# Patient Record
Sex: FEMALE | Race: WHITE | NOT HISPANIC OR LATINO | Employment: OTHER | URBAN - NONMETROPOLITAN AREA
[De-identification: names, ages, dates, MRNs, and addresses within clinical notes are randomized per-mention and may not be internally consistent; named-entity substitution may affect disease eponyms.]

---

## 2017-01-31 ENCOUNTER — TRANSCRIBE ORDERS (OUTPATIENT)
Dept: ADMINISTRATIVE | Facility: HOSPITAL | Age: 82
End: 2017-01-31

## 2017-01-31 DIAGNOSIS — F17.200 CURRENT SMOKER: Primary | ICD-10-CM

## 2017-02-13 ENCOUNTER — APPOINTMENT (OUTPATIENT)
Dept: CT IMAGING | Facility: HOSPITAL | Age: 82
End: 2017-02-13
Attending: INTERNAL MEDICINE

## 2017-07-25 LAB
ALBUMIN SERPL-MCNC: 4.5 G/DL (ref 3.5–5.2)
ALP BLD-CCNC: 108 U/L (ref 35–104)
ALT SERPL-CCNC: 11 U/L (ref 5–33)
ANION GAP SERPL CALCULATED.3IONS-SCNC: 24 MMOL/L (ref 7–19)
AST SERPL-CCNC: 17 U/L (ref 5–32)
BASOPHILS ABSOLUTE: 0.1 K/UL (ref 0–0.2)
BASOPHILS RELATIVE PERCENT: 0.6 % (ref 0–1)
BILIRUB SERPL-MCNC: 0.6 MG/DL (ref 0.2–1.2)
BUN BLDV-MCNC: 21 MG/DL (ref 8–23)
CALCIUM SERPL-MCNC: 10.2 MG/DL (ref 8.8–10.2)
CHLORIDE BLD-SCNC: 99 MMOL/L (ref 98–111)
CO2: 21 MMOL/L (ref 22–29)
CREAT SERPL-MCNC: 0.9 MG/DL (ref 0.5–0.9)
EOSINOPHILS ABSOLUTE: 0.5 K/UL (ref 0–0.6)
EOSINOPHILS RELATIVE PERCENT: 5.9 % (ref 0–5)
GFR NON-AFRICAN AMERICAN: 60
GLUCOSE BLD-MCNC: 79 MG/DL (ref 74–109)
HCT VFR BLD CALC: 42.8 % (ref 37–47)
HEMOGLOBIN: 14.7 G/DL (ref 12–16)
LDL CHOLESTEROL DIRECT: 81 MG/DL
LYMPHOCYTES ABSOLUTE: 2.3 K/UL (ref 1.1–4.5)
LYMPHOCYTES RELATIVE PERCENT: 26.3 % (ref 20–40)
MCH RBC QN AUTO: 31.1 PG (ref 27–31)
MCHC RBC AUTO-ENTMCNC: 34.3 G/DL (ref 33–37)
MCV RBC AUTO: 90.5 FL (ref 81–99)
MONOCYTES ABSOLUTE: 0.6 K/UL (ref 0–0.9)
MONOCYTES RELATIVE PERCENT: 7 % (ref 0–10)
NEUTROPHILS ABSOLUTE: 5.2 K/UL (ref 1.5–7.5)
NEUTROPHILS RELATIVE PERCENT: 60 % (ref 50–65)
PDW BLD-RTO: 12.3 % (ref 11.5–14.5)
PLATELET # BLD: 194 K/UL (ref 130–400)
PMV BLD AUTO: 10.5 FL (ref 9.4–12.3)
POTASSIUM SERPL-SCNC: 3.6 MMOL/L (ref 3.5–5)
RBC # BLD: 4.73 M/UL (ref 4.2–5.4)
SODIUM BLD-SCNC: 144 MMOL/L (ref 136–145)
TOTAL PROTEIN: 7.4 G/DL (ref 6.6–8.7)
TSH SERPL DL<=0.05 MIU/L-ACNC: 2.89 UIU/ML (ref 0.27–4.2)
WBC # BLD: 8.6 K/UL (ref 4.8–10.8)

## 2017-08-01 ENCOUNTER — OFFICE VISIT (OUTPATIENT)
Dept: INTERNAL MEDICINE | Age: 82
End: 2017-08-01
Payer: MEDICARE

## 2017-08-01 VITALS
HEART RATE: 52 BPM | TEMPERATURE: 97.3 F | SYSTOLIC BLOOD PRESSURE: 122 MMHG | HEIGHT: 60 IN | OXYGEN SATURATION: 96 % | RESPIRATION RATE: 16 BRPM | DIASTOLIC BLOOD PRESSURE: 64 MMHG | BODY MASS INDEX: 28.07 KG/M2 | WEIGHT: 143 LBS

## 2017-08-01 DIAGNOSIS — E03.9 HYPOTHYROIDISM, UNSPECIFIED TYPE: ICD-10-CM

## 2017-08-01 DIAGNOSIS — Z13.820 OSTEOPOROSIS SCREENING: Primary | ICD-10-CM

## 2017-08-01 DIAGNOSIS — I10 ESSENTIAL HYPERTENSION: ICD-10-CM

## 2017-08-01 DIAGNOSIS — E55.9 VITAMIN D DEFICIENCY: ICD-10-CM

## 2017-08-01 DIAGNOSIS — I25.10 CORONARY ARTERY DISEASE INVOLVING NATIVE HEART WITHOUT ANGINA PECTORIS, UNSPECIFIED VESSEL OR LESION TYPE: ICD-10-CM

## 2017-08-01 DIAGNOSIS — E78.2 MIXED HYPERLIPIDEMIA: ICD-10-CM

## 2017-08-01 DIAGNOSIS — E53.8 VITAMIN B12 DEFICIENCY: ICD-10-CM

## 2017-08-01 PROCEDURE — G8427 DOCREV CUR MEDS BY ELIG CLIN: HCPCS | Performed by: NURSE PRACTITIONER

## 2017-08-01 PROCEDURE — G8598 ASA/ANTIPLAT THER USED: HCPCS | Performed by: NURSE PRACTITIONER

## 2017-08-01 PROCEDURE — 1123F ACP DISCUSS/DSCN MKR DOCD: CPT | Performed by: NURSE PRACTITIONER

## 2017-08-01 PROCEDURE — G8419 CALC BMI OUT NRM PARAM NOF/U: HCPCS | Performed by: NURSE PRACTITIONER

## 2017-08-01 PROCEDURE — 99214 OFFICE O/P EST MOD 30 MIN: CPT | Performed by: NURSE PRACTITIONER

## 2017-08-01 PROCEDURE — 4040F PNEUMOC VAC/ADMIN/RCVD: CPT | Performed by: NURSE PRACTITIONER

## 2017-08-01 PROCEDURE — 4004F PT TOBACCO SCREEN RCVD TLK: CPT | Performed by: NURSE PRACTITIONER

## 2017-08-01 PROCEDURE — 1090F PRES/ABSN URINE INCON ASSESS: CPT | Performed by: NURSE PRACTITIONER

## 2017-08-01 PROCEDURE — G8400 PT W/DXA NO RESULTS DOC: HCPCS | Performed by: NURSE PRACTITIONER

## 2017-08-01 RX ORDER — SIMVASTATIN 40 MG
TABLET ORAL
Refills: 3 | COMMUNITY
Start: 2017-07-14

## 2017-08-01 RX ORDER — CLOPIDOGREL BISULFATE 75 MG/1
TABLET ORAL
Refills: 3 | COMMUNITY
Start: 2017-05-03

## 2017-08-01 RX ORDER — HYDROCHLOROTHIAZIDE 12.5 MG/1
CAPSULE, GELATIN COATED ORAL
Refills: 5 | COMMUNITY
Start: 2017-07-14

## 2017-08-01 RX ORDER — SYRINGE WITH NEEDLE, 1 ML 25GX5/8"
SYRINGE, EMPTY DISPOSABLE MISCELLANEOUS
Refills: 5 | COMMUNITY
Start: 2017-06-26

## 2017-08-01 RX ORDER — LEVOTHYROXINE SODIUM 88 UG/1
TABLET ORAL
Refills: 3 | COMMUNITY
Start: 2017-06-06

## 2017-08-01 RX ORDER — CYANOCOBALAMIN 1000 UG/ML
1000 INJECTION, SOLUTION INTRAMUSCULAR; SUBCUTANEOUS ONCE
COMMUNITY

## 2017-08-01 RX ORDER — AMLODIPINE BESYLATE 10 MG/1
TABLET ORAL
Refills: 3 | COMMUNITY
Start: 2017-06-06

## 2017-08-01 ASSESSMENT — ENCOUNTER SYMPTOMS
VOMITING: 0
STRIDOR: 0
COUGH: 0
BLOOD IN STOOL: 0
SHORTNESS OF BREATH: 0
ABDOMINAL PAIN: 0
EYE ITCHING: 0
TROUBLE SWALLOWING: 0
DIARRHEA: 0
COLOR CHANGE: 0
ABDOMINAL DISTENTION: 0
CONSTIPATION: 0
SORE THROAT: 0
CHOKING: 0
WHEEZING: 0
NAUSEA: 0
EYE DISCHARGE: 0

## 2017-08-01 ASSESSMENT — PATIENT HEALTH QUESTIONNAIRE - PHQ9
SUM OF ALL RESPONSES TO PHQ9 QUESTIONS 1 & 2: 0
2. FEELING DOWN, DEPRESSED OR HOPELESS: 0
1. LITTLE INTEREST OR PLEASURE IN DOING THINGS: 0
SUM OF ALL RESPONSES TO PHQ QUESTIONS 1-9: 0

## 2017-10-11 ENCOUNTER — OFFICE VISIT (OUTPATIENT)
Dept: CARDIOLOGY | Facility: CLINIC | Age: 82
End: 2017-10-11

## 2017-10-11 VITALS
SYSTOLIC BLOOD PRESSURE: 170 MMHG | BODY MASS INDEX: 27.48 KG/M2 | WEIGHT: 140 LBS | DIASTOLIC BLOOD PRESSURE: 72 MMHG | HEART RATE: 67 BPM | HEIGHT: 60 IN

## 2017-10-11 DIAGNOSIS — I10 ESSENTIAL HYPERTENSION: ICD-10-CM

## 2017-10-11 DIAGNOSIS — E78.2 MIXED HYPERLIPIDEMIA: ICD-10-CM

## 2017-10-11 DIAGNOSIS — I71.40 ABDOMINAL AORTIC ANEURYSM (AAA) WITHOUT RUPTURE (HCC): ICD-10-CM

## 2017-10-11 DIAGNOSIS — I25.10 CORONARY ARTERY DISEASE INVOLVING NATIVE CORONARY ARTERY OF NATIVE HEART WITHOUT ANGINA PECTORIS: Primary | ICD-10-CM

## 2017-10-11 PROCEDURE — 93000 ELECTROCARDIOGRAM COMPLETE: CPT | Performed by: INTERNAL MEDICINE

## 2017-10-11 PROCEDURE — 99213 OFFICE O/P EST LOW 20 MIN: CPT | Performed by: INTERNAL MEDICINE

## 2017-10-11 RX ORDER — METOPROLOL SUCCINATE 25 MG/1
25 TABLET, EXTENDED RELEASE ORAL DAILY
Status: ON HOLD | COMMUNITY
End: 2020-10-21 | Stop reason: SDUPTHER

## 2017-10-11 RX ORDER — LANOLIN ALCOHOL/MO/W.PET/CERES
1000 CREAM (GRAM) TOPICAL DAILY
Status: ON HOLD | COMMUNITY
End: 2020-10-21

## 2017-10-11 NOTE — PROGRESS NOTES
"Subjective    Alka Nicolas is a 82 y.o. female. Fu of ihd, risks and aaa    History of Present Illness     IHD:  Is active with yard work and housework but not much exercise. No cp or unusual sob or decline in stamina. Is compliant with meds that are well tolerated. EKG is NSC x a PAC.    HLD:  Tolerates a potent statin well and recent check with pcp was \"good\"    HTN:  Monitors at home \"135-140/60-70\", but, \"I've been rushing around this morning\"    AAA:  Can't find the measurements on this but is not being surveilled now. No abd pain and no foot pain or discoloration      The following portions of the patient's history were reviewed and updated as appropriate: allergies, current medications, past family history, past medical history, past social history, past surgical history and problem list.    Patient Active Problem List   Diagnosis   • CAD (coronary artery disease)   • Hypertension   • Hyperlipidemia   • Abdominal aortic aneurysm       No Known Allergies    Family History   Problem Relation Age of Onset   • No Known Problems Mother    • Heart disease Father    • Hyperlipidemia Father    • Hypertension Father    • Stroke Father        Social History     Social History   • Marital status: Unknown     Spouse name: N/A   • Number of children: N/A   • Years of education: N/A     Occupational History   • Not on file.     Social History Main Topics   • Smoking status: Current Every Day Smoker   • Smokeless tobacco: Never Used   • Alcohol use No   • Drug use: No   • Sexual activity: Not on file     Other Topics Concern   • Not on file     Social History Narrative         Current Outpatient Prescriptions:   •  amLODIPine (NORVASC) 5 MG tablet, Take 10 mg by mouth Daily., Disp: , Rfl:   •  aspirin 81 MG EC tablet, Take 81 mg by mouth Daily., Disp: , Rfl:   •  B Complex-C (B COMPLEX-B12-C IJ), Inject  as directed., Disp: , Rfl:   •  clopidogrel (PLAVIX) 75 MG tablet, Take 75 mg by mouth Daily., Disp: , Rfl:   •  " "hydrochlorothiazide (MICROZIDE) 12.5 MG capsule, Take 12.5 mg by mouth Daily., Disp: , Rfl:   •  levothyroxine (SYNTHROID, LEVOTHROID) 75 MCG tablet, Take 75 mcg by mouth Daily., Disp: , Rfl:   •  metoprolol succinate XL (TOPROL-XL) 25 MG 24 hr tablet, Take 25 mg by mouth Daily., Disp: , Rfl:   •  nitroglycerin (NITROSTAT) 0.4 MG SL tablet, Place 0.4 mg under the tongue Every 5 (Five) Minutes As Needed for chest pain. Take no more than 3 doses in 15 minutes., Disp: , Rfl:   •  simvastatin (ZOCOR) 20 MG tablet, Take 40 mg by mouth Every Night., Disp: , Rfl:   •  vitamin B-12 (CYANOCOBALAMIN) 1000 MCG tablet, Take 1,000 mcg by mouth Daily., Disp: , Rfl:     Past Surgical History:   Procedure Laterality Date   • APPENDECTOMY     • CARDIAC CATHETERIZATION     • CHOLECYSTECTOMY     • HYSTERECTOMY      total       Review of Systems   Respiratory: Negative for apnea, chest tightness and shortness of breath.    Cardiovascular: Negative for chest pain, palpitations and leg swelling.   Gastrointestinal: Negative for abdominal pain.   Genitourinary: Negative for dysuria.   Musculoskeletal: Negative for myalgias.   Neurological: Negative for weakness and light-headedness.   Psychiatric/Behavioral: Negative for sleep disturbance.       /72 (BP Location: Left arm, Patient Position: Sitting)  Pulse 67  Ht 60\" (152.4 cm)  Wt 140 lb (63.5 kg)  BMI 27.34 kg/m2  Procedures    Objective   Physical Exam   Constitutional: She is oriented to person, place, and time. She appears well-developed and well-nourished.   HENT:   Head: Normocephalic.   Eyes: Pupils are equal, round, and reactive to light.   Neck: No thyromegaly present.   Cardiovascular: Normal rate, regular rhythm and intact distal pulses.  Exam reveals no gallop and no friction rub.    Murmur heard.   Systolic murmur is present with a grade of 1/6   Sherrie at base   Pulmonary/Chest: Effort normal and breath sounds normal. No respiratory distress. She has no wheezes. She " has no rales.   Abdominal: Soft. Bowel sounds are normal. She exhibits no distension. There is no tenderness.   Musculoskeletal: She exhibits no edema or tenderness.   Neurological: She is alert and oriented to person, place, and time.   Skin: Skin is warm and dry.   Psychiatric: She has a normal mood and affect.       Assessment/Plan   Alka was seen today for coronary artery disease, hypertension and hyperlipidemia.    Diagnoses and all orders for this visit:    Coronary artery disease involving native coronary artery of native heart without angina pectoris  Comments:  no angina  Orders:  -     ECG 12 Lead    Essential hypertension  Comments:  controlled by home readings    Mixed hyperlipidemia  Comments:  controlled by report    Abdominal aortic aneurysm (AAA) without rupture  Comments:  asymptomatic. not checked recently - will get US of aorta  Orders:  -     Cancel: US Retroperitnl Abd, Leftd  -     US aorta limited; Future                 Return in about 18 months (around 4/11/2019).  Orders Placed This Encounter   Procedures   • US aorta limited     Standing Status:   Future     Standing Expiration Date:   10/11/2018     Order Specific Question:   Reason for Exam:     Answer:   known aaa   • ECG 12 Lead     Order Specific Question:   Reason for Exam:     Answer:   Cad

## 2017-10-20 ENCOUNTER — HOSPITAL ENCOUNTER (OUTPATIENT)
Dept: ULTRASOUND IMAGING | Facility: HOSPITAL | Age: 82
Discharge: HOME | End: 2017-10-20
Attending: INTERNAL MEDICINE | Admitting: INTERNAL MEDICINE

## 2017-10-20 DIAGNOSIS — I71.40 ABDOMINAL AORTIC ANEURYSM (AAA) WITHOUT RUPTURE (HCC): ICD-10-CM

## 2017-10-20 PROCEDURE — 76775 US EXAM ABDO BACK WALL LIM: CPT

## 2017-10-24 ENCOUNTER — OFFICE VISIT (OUTPATIENT)
Dept: RETAIL CLINIC | Facility: CLINIC | Age: 82
End: 2017-10-24

## 2017-10-24 DIAGNOSIS — Z23 NEED FOR IMMUNIZATION AGAINST INFLUENZA: Primary | ICD-10-CM

## 2017-10-24 NOTE — PROGRESS NOTES
Patient presents for flu shot. She denies having problems with flu shots in the past. She denies allergies. See administration note and scanned [employer] forms.

## 2018-02-01 DIAGNOSIS — E78.2 MIXED HYPERLIPIDEMIA: ICD-10-CM

## 2018-02-01 DIAGNOSIS — E03.9 HYPOTHYROIDISM, UNSPECIFIED TYPE: ICD-10-CM

## 2018-02-01 DIAGNOSIS — I10 ESSENTIAL HYPERTENSION: ICD-10-CM

## 2018-02-01 DIAGNOSIS — E55.9 VITAMIN D DEFICIENCY: ICD-10-CM

## 2018-02-01 LAB
ALBUMIN SERPL-MCNC: 4.2 G/DL (ref 3.5–5.2)
ALP BLD-CCNC: 113 U/L (ref 35–104)
ALT SERPL-CCNC: 13 U/L (ref 5–33)
ANION GAP SERPL CALCULATED.3IONS-SCNC: 18 MMOL/L (ref 7–19)
AST SERPL-CCNC: 17 U/L (ref 5–32)
BASOPHILS ABSOLUTE: 0 K/UL (ref 0–0.2)
BASOPHILS RELATIVE PERCENT: 0.5 % (ref 0–1)
BILIRUB SERPL-MCNC: 0.5 MG/DL (ref 0.2–1.2)
BUN BLDV-MCNC: 24 MG/DL (ref 8–23)
CALCIUM SERPL-MCNC: 9.3 MG/DL (ref 8.8–10.2)
CHLORIDE BLD-SCNC: 97 MMOL/L (ref 98–111)
CHOLESTEROL, TOTAL: 143 MG/DL (ref 160–199)
CO2: 25 MMOL/L (ref 22–29)
CREAT SERPL-MCNC: 0.9 MG/DL (ref 0.5–0.9)
EOSINOPHILS ABSOLUTE: 0.4 K/UL (ref 0–0.6)
EOSINOPHILS RELATIVE PERCENT: 4.8 % (ref 0–5)
GFR NON-AFRICAN AMERICAN: 60
GLUCOSE BLD-MCNC: 82 MG/DL (ref 74–109)
HCT VFR BLD CALC: 43.3 % (ref 37–47)
HDLC SERPL-MCNC: 54 MG/DL (ref 65–121)
HEMOGLOBIN: 14.4 G/DL (ref 12–16)
LDL CHOLESTEROL CALCULATED: 59 MG/DL
LYMPHOCYTES ABSOLUTE: 2.1 K/UL (ref 1.1–4.5)
LYMPHOCYTES RELATIVE PERCENT: 24.4 % (ref 20–40)
MCH RBC QN AUTO: 29.9 PG (ref 27–31)
MCHC RBC AUTO-ENTMCNC: 33.3 G/DL (ref 33–37)
MCV RBC AUTO: 89.8 FL (ref 81–99)
MONOCYTES ABSOLUTE: 0.7 K/UL (ref 0–0.9)
MONOCYTES RELATIVE PERCENT: 7.6 % (ref 0–10)
NEUTROPHILS ABSOLUTE: 5.3 K/UL (ref 1.5–7.5)
NEUTROPHILS RELATIVE PERCENT: 62.5 % (ref 50–65)
PDW BLD-RTO: 12.2 % (ref 11.5–14.5)
PLATELET # BLD: 210 K/UL (ref 130–400)
PMV BLD AUTO: 10.2 FL (ref 9.4–12.3)
POTASSIUM SERPL-SCNC: 4 MMOL/L (ref 3.5–5)
RBC # BLD: 4.82 M/UL (ref 4.2–5.4)
SODIUM BLD-SCNC: 140 MMOL/L (ref 136–145)
TOTAL PROTEIN: 7 G/DL (ref 6.6–8.7)
TRIGL SERPL-MCNC: 152 MG/DL (ref 0–149)
TSH SERPL DL<=0.05 MIU/L-ACNC: 2.78 UIU/ML (ref 0.27–4.2)
VITAMIN D 25-HYDROXY: 32.2 NG/ML
WBC # BLD: 8.5 K/UL (ref 4.8–10.8)

## 2018-02-05 ENCOUNTER — OFFICE VISIT (OUTPATIENT)
Dept: INTERNAL MEDICINE | Age: 83
End: 2018-02-05
Payer: MEDICARE

## 2018-02-05 VITALS
SYSTOLIC BLOOD PRESSURE: 138 MMHG | HEART RATE: 56 BPM | WEIGHT: 146 LBS | HEIGHT: 60 IN | DIASTOLIC BLOOD PRESSURE: 72 MMHG | BODY MASS INDEX: 28.66 KG/M2 | OXYGEN SATURATION: 98 %

## 2018-02-05 DIAGNOSIS — I10 ESSENTIAL HYPERTENSION: Primary | ICD-10-CM

## 2018-02-05 DIAGNOSIS — E53.8 VITAMIN B12 DEFICIENCY: ICD-10-CM

## 2018-02-05 DIAGNOSIS — L40.9 PSORIASIS: ICD-10-CM

## 2018-02-05 DIAGNOSIS — E78.2 MIXED HYPERLIPIDEMIA: ICD-10-CM

## 2018-02-05 DIAGNOSIS — E03.9 ACQUIRED HYPOTHYROIDISM: ICD-10-CM

## 2018-02-05 DIAGNOSIS — I25.10 CORONARY ARTERY DISEASE INVOLVING NATIVE CORONARY ARTERY OF NATIVE HEART WITHOUT ANGINA PECTORIS: ICD-10-CM

## 2018-02-05 DIAGNOSIS — Z72.0 TOBACCO USE: ICD-10-CM

## 2018-02-05 PROCEDURE — G8427 DOCREV CUR MEDS BY ELIG CLIN: HCPCS | Performed by: INTERNAL MEDICINE

## 2018-02-05 PROCEDURE — 99214 OFFICE O/P EST MOD 30 MIN: CPT | Performed by: INTERNAL MEDICINE

## 2018-02-05 PROCEDURE — G8484 FLU IMMUNIZE NO ADMIN: HCPCS | Performed by: INTERNAL MEDICINE

## 2018-02-05 PROCEDURE — 1123F ACP DISCUSS/DSCN MKR DOCD: CPT | Performed by: INTERNAL MEDICINE

## 2018-02-05 PROCEDURE — G8400 PT W/DXA NO RESULTS DOC: HCPCS | Performed by: INTERNAL MEDICINE

## 2018-02-05 PROCEDURE — 1090F PRES/ABSN URINE INCON ASSESS: CPT | Performed by: INTERNAL MEDICINE

## 2018-02-05 PROCEDURE — 4040F PNEUMOC VAC/ADMIN/RCVD: CPT | Performed by: INTERNAL MEDICINE

## 2018-02-05 PROCEDURE — G8598 ASA/ANTIPLAT THER USED: HCPCS | Performed by: INTERNAL MEDICINE

## 2018-02-05 PROCEDURE — G8419 CALC BMI OUT NRM PARAM NOF/U: HCPCS | Performed by: INTERNAL MEDICINE

## 2018-02-05 PROCEDURE — 4004F PT TOBACCO SCREEN RCVD TLK: CPT | Performed by: INTERNAL MEDICINE

## 2018-02-05 ASSESSMENT — ENCOUNTER SYMPTOMS
COUGH: 0
SORE THROAT: 0
TROUBLE SWALLOWING: 0
ABDOMINAL PAIN: 0
NAUSEA: 0
RHINORRHEA: 0
SHORTNESS OF BREATH: 0

## 2018-02-05 NOTE — PROGRESS NOTES
 No narrative on file      No Known Allergies   Current Outpatient Prescriptions   Medication Sig Dispense Refill    amLODIPine (NORVASC) 10 MG tablet TAKE 1 TABLET DAILY. 3    hydrochlorothiazide (MICROZIDE) 12.5 MG capsule 1 CAPSULE(S) EVERY OTHER DAY  5    metoprolol tartrate (LOPRESSOR) 25 MG tablet       simvastatin (ZOCOR) 40 MG tablet TAKE 1/2 TABLET DAILY. 3    B-D 3CC LUER-MCKENZIE SYR 23GX1\" 23G X 1\" 3 ML MISC AS DIRECTED  5    clopidogrel (PLAVIX) 75 MG tablet   3    LEVOXYL 88 MCG tablet TAKE 1 TABLET DAILY. 3    aspirin 81 MG tablet Take 81 mg by mouth daily      cyanocobalamin 1000 MCG/ML injection Inject 1,000 mcg into the muscle once      Calcium-Vitamin D-Vitamin K 500-100-40 MG-UNT-MCG CHEW Take by mouth       No current facility-administered medications for this visit. Review of Systems   Constitutional: Negative for fatigue and fever. HENT: Negative for rhinorrhea, sore throat and trouble swallowing. Respiratory: Negative for cough and shortness of breath. Cardiovascular: Negative for chest pain and palpitations. Gastrointestinal: Negative for abdominal pain and nausea. Endocrine: Negative for cold intolerance and heat intolerance. Genitourinary: Negative for dysuria and frequency. Skin: Negative for rash and wound. Psoriasis on scalp, worsening, knees   Neurological: Negative for seizures and syncope. Psychiatric/Behavioral: Negative for behavioral problems and hallucinations. /72   Pulse 56   Ht 5' (1.524 m)   Wt 146 lb (66.2 kg)   SpO2 98%   BMI 28.51 kg/m²   Physical Exam   Constitutional: She appears well-developed and well-nourished. HENT:   Head: Normocephalic and atraumatic. Eyes: Pupils are equal, round, and reactive to light. No scleral icterus. Neck: No JVD present. Cardiovascular: Regular rhythm. No murmur heard. Pulmonary/Chest: No respiratory distress. She exhibits no tenderness.    Abdominal: She exhibits no Direct     Standing Status:   Future     Standing Expiration Date:   2/5/2019   [facility]- Jett Nixon MD     Referral Priority:   Routine     Referral Type:   Consult for Advice and Opinion     Referral Reason:   Specialty Services Required     Referred to Provider:   Leif Coronado MD     Requested Specialty:   Dermatology     Number of Visits Requested:   1

## 2018-06-28 DIAGNOSIS — L40.9 PSORIASIS: ICD-10-CM

## 2018-06-28 DIAGNOSIS — I25.10 CORONARY ARTERY DISEASE INVOLVING NATIVE CORONARY ARTERY OF NATIVE HEART WITHOUT ANGINA PECTORIS: ICD-10-CM

## 2018-06-28 DIAGNOSIS — I10 ESSENTIAL HYPERTENSION: ICD-10-CM

## 2018-06-28 DIAGNOSIS — E03.9 ACQUIRED HYPOTHYROIDISM: ICD-10-CM

## 2018-06-28 DIAGNOSIS — E53.8 VITAMIN B12 DEFICIENCY: ICD-10-CM

## 2018-06-28 DIAGNOSIS — E78.2 MIXED HYPERLIPIDEMIA: ICD-10-CM

## 2018-06-28 LAB
ALBUMIN SERPL-MCNC: 4.3 G/DL (ref 3.5–5.2)
ALP BLD-CCNC: 112 U/L (ref 35–104)
ALT SERPL-CCNC: 14 U/L (ref 5–33)
ANION GAP SERPL CALCULATED.3IONS-SCNC: 15 MMOL/L (ref 7–19)
AST SERPL-CCNC: 16 U/L (ref 5–32)
BILIRUB SERPL-MCNC: 0.3 MG/DL (ref 0.2–1.2)
BUN BLDV-MCNC: 21 MG/DL (ref 8–23)
CALCIUM SERPL-MCNC: 9.4 MG/DL (ref 8.8–10.2)
CHLORIDE BLD-SCNC: 100 MMOL/L (ref 98–111)
CO2: 26 MMOL/L (ref 22–29)
CREAT SERPL-MCNC: 1 MG/DL (ref 0.5–0.9)
GFR NON-AFRICAN AMERICAN: 53
GLUCOSE BLD-MCNC: 87 MG/DL (ref 74–109)
HCT VFR BLD CALC: 40.8 % (ref 37–47)
HEMOGLOBIN: 13.5 G/DL (ref 12–16)
LDL CHOLESTEROL DIRECT: 66 MG/DL
MCH RBC QN AUTO: 30.1 PG (ref 27–31)
MCHC RBC AUTO-ENTMCNC: 33.1 G/DL (ref 33–37)
MCV RBC AUTO: 90.9 FL (ref 81–99)
PDW BLD-RTO: 12.3 % (ref 11.5–14.5)
PLATELET # BLD: 202 K/UL (ref 130–400)
PMV BLD AUTO: 10.5 FL (ref 9.4–12.3)
POTASSIUM SERPL-SCNC: 3.8 MMOL/L (ref 3.5–5)
RBC # BLD: 4.49 M/UL (ref 4.2–5.4)
SODIUM BLD-SCNC: 141 MMOL/L (ref 136–145)
TOTAL PROTEIN: 6.8 G/DL (ref 6.6–8.7)
TSH SERPL DL<=0.05 MIU/L-ACNC: 2.37 UIU/ML (ref 0.27–4.2)
VITAMIN B-12: 1334 PG/ML (ref 211–946)
WBC # BLD: 8.3 K/UL (ref 4.8–10.8)

## 2018-07-09 ENCOUNTER — OFFICE VISIT (OUTPATIENT)
Dept: INTERNAL MEDICINE | Age: 83
End: 2018-07-09
Payer: MEDICARE

## 2018-07-09 VITALS
SYSTOLIC BLOOD PRESSURE: 134 MMHG | HEART RATE: 52 BPM | RESPIRATION RATE: 16 BRPM | BODY MASS INDEX: 28.27 KG/M2 | WEIGHT: 144 LBS | HEIGHT: 60 IN | DIASTOLIC BLOOD PRESSURE: 72 MMHG | OXYGEN SATURATION: 97 %

## 2018-07-09 DIAGNOSIS — E78.2 MIXED HYPERLIPIDEMIA: ICD-10-CM

## 2018-07-09 DIAGNOSIS — E03.9 ACQUIRED HYPOTHYROIDISM: ICD-10-CM

## 2018-07-09 DIAGNOSIS — I10 ESSENTIAL HYPERTENSION: Primary | ICD-10-CM

## 2018-07-09 DIAGNOSIS — E53.8 VITAMIN B12 DEFICIENCY: ICD-10-CM

## 2018-07-09 PROCEDURE — G8400 PT W/DXA NO RESULTS DOC: HCPCS | Performed by: NURSE PRACTITIONER

## 2018-07-09 PROCEDURE — 4040F PNEUMOC VAC/ADMIN/RCVD: CPT | Performed by: NURSE PRACTITIONER

## 2018-07-09 PROCEDURE — 1123F ACP DISCUSS/DSCN MKR DOCD: CPT | Performed by: NURSE PRACTITIONER

## 2018-07-09 PROCEDURE — G8598 ASA/ANTIPLAT THER USED: HCPCS | Performed by: NURSE PRACTITIONER

## 2018-07-09 PROCEDURE — 1090F PRES/ABSN URINE INCON ASSESS: CPT | Performed by: NURSE PRACTITIONER

## 2018-07-09 PROCEDURE — G8417 CALC BMI ABV UP PARAM F/U: HCPCS | Performed by: NURSE PRACTITIONER

## 2018-07-09 PROCEDURE — 99214 OFFICE O/P EST MOD 30 MIN: CPT | Performed by: NURSE PRACTITIONER

## 2018-07-09 PROCEDURE — G8427 DOCREV CUR MEDS BY ELIG CLIN: HCPCS | Performed by: NURSE PRACTITIONER

## 2018-07-09 PROCEDURE — 4004F PT TOBACCO SCREEN RCVD TLK: CPT | Performed by: NURSE PRACTITIONER

## 2018-07-09 ASSESSMENT — ENCOUNTER SYMPTOMS
ABDOMINAL PAIN: 0
COUGH: 0
DIARRHEA: 0
CONSTIPATION: 0
TROUBLE SWALLOWING: 0
BLOOD IN STOOL: 0
CHOKING: 0
WHEEZING: 0
STRIDOR: 0
ABDOMINAL DISTENTION: 0
SORE THROAT: 0
EYE ITCHING: 0
EYE DISCHARGE: 0
SHORTNESS OF BREATH: 0
VOMITING: 0
COLOR CHANGE: 0
NAUSEA: 0

## 2018-07-09 NOTE — PROGRESS NOTES
 Other Mother         chronic renal failure  67    Heart Disease Father     Stroke Father          age 71       Social History   Substance Use Topics    Smoking status: Current Every Day Smoker     Packs/day: 0.50    Smokeless tobacco: Never Used    Alcohol use No      Current Outpatient Prescriptions   Medication Sig Dispense Refill    metoprolol tartrate (LOPRESSOR) 25 MG tablet Take 1 tablet by mouth daily 90 tablet 3    amLODIPine (NORVASC) 10 MG tablet TAKE 1 TABLET DAILY. 3    hydrochlorothiazide (MICROZIDE) 12.5 MG capsule 1 CAPSULE(S) EVERY OTHER DAY  5    simvastatin (ZOCOR) 40 MG tablet TAKE 1/2 TABLET DAILY. 3    B-D 3CC LUER-MCKENZIE SYR 23GX1\" 23G X 1\" 3 ML MISC AS DIRECTED  5    clopidogrel (PLAVIX) 75 MG tablet   3    LEVOXYL 88 MCG tablet TAKE 1 TABLET DAILY. 3    aspirin 81 MG tablet Take 81 mg by mouth daily      cyanocobalamin 1000 MCG/ML injection Inject 1,000 mcg into the muscle once      Calcium-Vitamin D-Vitamin K 500-100-40 MG-UNT-MCG CHEW Take by mouth       No current facility-administered medications for this visit. No Known Allergies    Health Maintenance   Topic Date Due    DTaP/Tdap/Td vaccine (1 - Tdap) 1954    Shingles Vaccine (1 of 2 - 2 Dose Series) 1985    Flu vaccine (1) 2018    TSH testing  2019    Potassium monitoring  2019    Creatinine monitoring  2019    DEXA (modify frequency per FRAX score)  Completed    Pneumococcal low/med risk  Completed       Subjective:      Review of Systems   Constitutional: Negative for fatigue, fever and unexpected weight change. HENT: Negative for ear discharge, ear pain, mouth sores, sore throat and trouble swallowing. Eyes: Negative for discharge, itching and visual disturbance. Respiratory: Negative for cough, choking, shortness of breath, wheezing and stridor. Cardiovascular: Negative for chest pain, palpitations and leg swelling.    Gastrointestinal: Negative (1.524 m)   Wt 144 lb (65.3 kg)   SpO2 97%   BMI 28.12 kg/m²     Assessment:       Diagnosis Orders   1. Essential hypertension     2. Mixed hyperlipidemia     3. Acquired hypothyroidism     4. Vitamin B12 deficiency         Plan:        Patient given educational materials - see patient instructions. Discussed use, benefit, and side effects of prescribed medications. All patient questions answered. Pt voiced understanding. Reviewed health maintenance. Instructed to continue current medications, diet and exercise. Patient agreed with treatment plan. Follow up as directed. MEDICATIONS:  No orders of the defined types were placed in this encounter. ORDERS:  No orders of the defined types were placed in this encounter. Follow-up:  No Follow-up on file. PATIENT INSTRUCTIONS:  Patient Instructions   1. HTN;  Stable no changes  2. Hyperlipidemia; Stable for now ; No changes  3. Hypothyroidism; Stable no changes;   4.  B 12 def; Only take the injection monthly      Keep fu with Dr Rishi Salvador               Electronically signed by CATHERINE Montiel on 7/9/2018 at 11:35 AM    EMR Dragon/transcription disclaimer:  Much of this encounter note is electronic transcription/translation of spoken language to printed texts. The electronic translation of spoken language may be erroneous, or at times, nonsensical words or phrases may be inadvertently transcribed.   Although I have reviewed the note for such errors, some may still exist.

## 2018-09-04 ENCOUNTER — HOSPITAL ENCOUNTER (OUTPATIENT)
Dept: WOMENS IMAGING | Age: 83
Discharge: HOME | End: 2018-09-04
Payer: MEDICARE

## 2018-09-04 DIAGNOSIS — Z12.31 VISIT FOR SCREENING MAMMOGRAM: ICD-10-CM

## 2018-09-04 DIAGNOSIS — Z78.0 POSTMENOPAUSE: ICD-10-CM

## 2018-09-04 PROCEDURE — 77080 DXA BONE DENSITY AXIAL: CPT

## 2018-09-04 PROCEDURE — 77063 BREAST TOMOSYNTHESIS BI: CPT

## 2018-09-08 ENCOUNTER — OFFICE VISIT (OUTPATIENT)
Dept: RETAIL CLINIC | Facility: CLINIC | Age: 83
End: 2018-09-08

## 2018-09-08 VITALS
WEIGHT: 139 LBS | TEMPERATURE: 98.6 F | HEART RATE: 77 BPM | OXYGEN SATURATION: 97 % | BODY MASS INDEX: 27.15 KG/M2 | RESPIRATION RATE: 16 BRPM | SYSTOLIC BLOOD PRESSURE: 142 MMHG | DIASTOLIC BLOOD PRESSURE: 70 MMHG

## 2018-09-08 DIAGNOSIS — J01.00 ACUTE MAXILLARY SINUSITIS, RECURRENCE NOT SPECIFIED: Primary | ICD-10-CM

## 2018-09-08 DIAGNOSIS — K11.20 SIALADENITIS: ICD-10-CM

## 2018-09-08 PROCEDURE — 99214 OFFICE O/P EST MOD 30 MIN: CPT | Performed by: NURSE PRACTITIONER

## 2018-09-08 RX ORDER — AMOXICILLIN AND CLAVULANATE POTASSIUM 875; 125 MG/1; MG/1
1 TABLET, FILM COATED ORAL EVERY 12 HOURS SCHEDULED
Qty: 20 TABLET | Refills: 0 | Status: SHIPPED | OUTPATIENT
Start: 2018-09-08 | End: 2018-09-08 | Stop reason: SDUPTHER

## 2018-09-08 RX ORDER — AMOXICILLIN AND CLAVULANATE POTASSIUM 875; 125 MG/1; MG/1
1 TABLET, FILM COATED ORAL EVERY 12 HOURS SCHEDULED
Qty: 20 TABLET | Refills: 0 | Status: SHIPPED | OUTPATIENT
Start: 2018-09-08 | End: 2018-09-18

## 2018-09-08 NOTE — PATIENT INSTRUCTIONS
Salivary Gland Infection  A salivary gland infection is an infection in one or more of the glands that produce spit (saliva). You have six major salivary glands. Each gland has a duct that carries saliva into your mouth. Saliva keeps your mouth moist and breaks down the food that you eat. It also helps to prevent tooth decay.  Two salivary glands are located just in front of your ears (parotid). The ducts for these glands open up inside your cheeks, near your back teeth. You also have two glands under your tongue (sublingual) and two glands under your jaw (submandibular). The ducts for these glands open under your tongue. Any salivary gland can become infected. Most infections occur in the parotid glands or submandibular glands.  What are the causes?  Salivary glands can be infected by viruses or bacteria.  · The mumps virus is the most common cause of viral salivary gland infections, though mumps is now rare in many areas because of vaccination.  ? This infection causes swelling in both parotid glands.  ? Viral infections are more common in children.  · The bacteria that cause salivary gland infections are usually the same bacteria that normally live in your mouth.  ? A stone can form in a salivary gland and block the flow of saliva. As a result, saliva backs up into the salivary gland. Bacteria may then start to grow behind the blockage and cause infection.  ? Bacterial infections usually cause pain and swelling on one side of the face. Submandibular gland swelling occurs under the jaw. Parotid swelling occurs in front of the ear.  ? Bacterial infections are more common in adults.    What increases the risk?  Children who do not get the MMR (measles, mumps, rubella) vaccine are more likely to get mumps, which can cause a viral salivary gland infection.  Risk factors for bacterial infections include:  · Poor dental care (oral hygiene).  · Smoking.  · Not drinking enough water.  · Having a disease that causes dry  mouth and dry eyes (Mikulicz syndrome or Sjogren syndrome).    What are the signs or symptoms?  The main sign of salivary gland infection is a swollen salivary gland. This type of inflammation is often called sialadenitis. You may have swelling in front of your ear, under your jaw, or under your tongue. Swelling may get worse when you eat and decrease after you eat. Other signs and symptoms include:  · Pain.  · Tenderness.  · Redness.  · Dry mouth.  · Bad taste in your mouth.  · Difficulty chewing and swallowing.  · Fever.    How is this diagnosed?  Your health care provider may suspect a salivary gland infection based on your signs and symptoms. He or she will also do a physical exam. The health care provider will look and feel inside your mouth to see whether a stone is blocking a salivary gland duct. You may need to see an ear, nose, and throat specialist (ENT or otolaryngologist) for diagnosis and treatment. You may also need to have diagnostic tests, such as:  · An X-ray to check for a stone.  · Other imaging studies to look for an abscess and to rule out other causes of swelling. These tests may include:  ? Ultrasound.  ? CT scan.  ? MRI.  · Culture and sensitivity test. This involves collecting a sample of pus for testing in the lab to see what bacteria grow and what antibiotics they are sensitive to. The testing sample may be:  ? Swabbed from a salivary gland duct.  ? Withdrawn from a swollen gland with a needle (aspiration).    How is this treated?  Viral salivary gland infections usually clear up without treatment. Bacterial infections are usually treated with antibiotic medicine. Severe infections that cause difficulty with swallowing may be treated with an IV antibiotic in the hospital.  Other treatments may include:  · Probing and widening the salivary duct to allow a stone to pass. In some cases, a thin, flexible scope (endoscope) may be inserted into the duct to find a stone and remove it.  · Breaking  up a stone using sound waves.  · Draining an infected gland (abscess) with a needle.  · In some cases, you may need surgery so your health care provider can:  ? Remove a stone.  ? Drain pus from an abscess.  ? Remove a badly infected gland.    Follow these instructions at home:  · Take medicines only as directed by your health care provider.  · If you were prescribed an antibiotic medicine, finish it all even if you start to feel better.  · Follow these instructions every few hours:  ? Suck on a lemon candy to stimulate the flow of saliva.  ? Put a warm compress over the gland.  ? Gently massage the gland.  · Drink enough fluid to keep your urine clear or pale yellow.  · Rinse your mouth with a mixture of warm water and salt every few hours. To make this mixture, add a pinch of salt to 1 cup of warm water.  · Practice good oral hygiene by brushing and flossing your teeth after meals and before you go to bed.  · Do not use any tobacco products, including cigarettes, chewing tobacco, or electronic cigarettes. If you need help quitting, ask your health care provider.  Contact a health care provider if:  · You have pain and swelling in your face, jaw, or mouth after eating.  · You have persistent swelling in any of these places:  ? In front of your ear.  ? Under your jaw.  ? Inside your mouth.  Get help right away if:  · You have pain and swelling in your face, jaw, or mouth that are getting worse.  · Your pain and swelling make it hard to swallow or breathe.  This information is not intended to replace advice given to you by your health care provider. Make sure you discuss any questions you have with your health care provider.  Document Released: 01/25/2006 Document Revised: 05/25/2017 Document Reviewed: 05/20/2015  [employer] Interactive Patient Education © 2018 [employer]

## 2018-09-08 NOTE — PROGRESS NOTES
"Subjective   Alka Nicolas is a 83 y.o. female.     The whole right side of my face is hurting and my ear.  And this knot came up about 2 days ago, and I figured it's all connected.\"      Sinusitis   This is a new problem. The current episode started in the past 7 days. The problem has been rapidly worsening since onset. There has been no fever. Her pain is at a severity of 6/10. The pain is moderate. Associated symptoms include congestion, ear pain, sinus pressure, a sore throat and swollen glands. Pertinent negatives include no chills, headaches, hoarse voice or shortness of breath. Past treatments include nothing.        The following portions of the patient's history were reviewed and updated as appropriate: allergies, current medications, past family history, past medical history, past social history, past surgical history and problem list.    Review of Systems   Constitutional: Negative for chills and fever.   HENT: Positive for congestion, ear pain, sinus pressure and sore throat. Negative for hoarse voice.    Respiratory: Negative for shortness of breath.    Cardiovascular: Negative for chest pain.   Gastrointestinal: Negative for nausea and vomiting.   Neurological: Negative for dizziness.   Hematological: Positive for adenopathy.       Objective      /70   Pulse 77   Temp 98.6 °F (37 °C) (Oral)   Resp 16   Wt 63 kg (139 lb)   SpO2 97%   BMI 27.15 kg/m²     Physical Exam   Constitutional: She is oriented to person, place, and time. She appears well-developed and well-nourished. No distress.   HENT:   Head: Normocephalic and atraumatic.   Right Ear: Hearing, tympanic membrane, external ear and ear canal normal.   Left Ear: Hearing, tympanic membrane, external ear and ear canal normal.   Nose: Mucosal edema present. Right sinus exhibits maxillary sinus tenderness. Right sinus exhibits no frontal sinus tenderness. Left sinus exhibits no maxillary sinus tenderness and no frontal sinus tenderness. "   Mouth/Throat: She has dentures.   Dull dentures upper and lower   Eyes: Pupils are equal, round, and reactive to light. Conjunctivae and EOM are normal.   Neck: Normal range of motion.   Cardiovascular: Normal rate and regular rhythm.    Pulmonary/Chest: Effort normal and breath sounds normal.   Musculoskeletal: Normal range of motion.   Lymphadenopathy:        Head (right side): Submandibular adenopathy present.   Large submandibular, firm node versus enlarged salivary gland   Neurological: She is alert and oriented to person, place, and time. No cranial nerve deficit.   Skin: Skin is warm and dry. Capillary refill takes less than 2 seconds.   Psychiatric: She has a normal mood and affect. Her behavior is normal. Judgment and thought content normal.   Nursing note and vitals reviewed.        Assessment/Plan   Alka was seen today for sinusitis.    Diagnoses and all orders for this visit:    Acute maxillary sinusitis, recurrence not specified    Sialadenitis    Other orders  -     Discontinue: amoxicillin-clavulanate (AUGMENTIN) 875-125 MG per tablet; Take 1 tablet by mouth Every 12 (Twelve) Hours for 10 days.  -     amoxicillin-clavulanate (AUGMENTIN) 875-125 MG per tablet; Take 1 tablet by mouth Every 12 (Twelve) Hours for 10 days.      Discussed differentials with patient.  Patient instructed to begin using lemon drops, and increase fluids. Call Monday to schedule a follow up with PCP for a recheck and further evaluation.    CHRISTIANO Rahman

## 2018-09-26 PROBLEM — Z13.820 OSTEOPOROSIS SCREENING: Status: RESOLVED | Noted: 2017-08-01 | Resolved: 2018-09-26

## 2018-10-16 ENCOUNTER — OFFICE VISIT (OUTPATIENT)
Dept: CARDIOLOGY | Facility: CLINIC | Age: 83
End: 2018-10-16

## 2018-10-16 VITALS
HEIGHT: 60 IN | HEART RATE: 58 BPM | BODY MASS INDEX: 27.48 KG/M2 | DIASTOLIC BLOOD PRESSURE: 62 MMHG | WEIGHT: 140 LBS | SYSTOLIC BLOOD PRESSURE: 123 MMHG

## 2018-10-16 DIAGNOSIS — I25.10 CORONARY ARTERY DISEASE INVOLVING NATIVE CORONARY ARTERY OF NATIVE HEART WITHOUT ANGINA PECTORIS: ICD-10-CM

## 2018-10-16 DIAGNOSIS — I10 ESSENTIAL HYPERTENSION: ICD-10-CM

## 2018-10-16 DIAGNOSIS — I71.40 ABDOMINAL AORTIC ANEURYSM (AAA) WITHOUT RUPTURE (HCC): Primary | ICD-10-CM

## 2018-10-16 DIAGNOSIS — E78.2 MIXED HYPERLIPIDEMIA: ICD-10-CM

## 2018-10-16 PROCEDURE — 99214 OFFICE O/P EST MOD 30 MIN: CPT | Performed by: NURSE PRACTITIONER

## 2018-10-16 PROCEDURE — 93000 ELECTROCARDIOGRAM COMPLETE: CPT | Performed by: NURSE PRACTITIONER

## 2018-10-16 NOTE — PROGRESS NOTES
Subjective:     Encounter Date:10/16/2018      Patient ID: Alka Nicolas is a 83 y.o. female.    Chief Complaint: Yearly follow up  Coronary Artery Disease   Presents for follow-up visit. Pertinent negatives include no chest pain, chest pressure, chest tightness, dizziness, leg swelling, muscle weakness, palpitations, shortness of breath or weight gain. Risk factors include hyperlipidemia. The symptoms have been stable. Compliance with diet is good. Compliance with exercise is good. Compliance with medications is good.   Hypertension   This is a chronic problem. The current episode started more than 1 year ago. The problem is controlled. Pertinent negatives include no chest pain, headaches, malaise/fatigue, orthopnea, palpitations, PND or shortness of breath. Past treatments include beta blockers and calcium channel blockers. The current treatment provides significant improvement. There are no compliance problems.  Hypertensive end-organ damage includes CAD/MI.   Hyperlipidemia   This is a chronic problem. The current episode started more than 1 year ago. The problem is controlled. Recent lipid tests were reviewed and are normal. Pertinent negatives include no chest pain or shortness of breath. Current antihyperlipidemic treatment includes statins. The current treatment provides significant improvement of lipids. There are no compliance problems.      Patient presents today for routine follow up for history of coronary artery disease, AAA, hypertension and hyperlipidemia. Patient follows with Dr. Rubio as her PCP- since Dr. Irene retired. Overall patient does well. She is active given her age- but states she can't do what she used to - slow progression. Patient denies chest pain or shortness of breath. Patient states occasionally she gets lightheaded- but states she thinks it may be related to allergies. Denies chest pain, shortness of breath, palpitations, syncope, swelling on legs. She reports good lipid  control. Has yearly ultrasounds to monitor AAA. History of PCI per Dr. Ovalle roughly 10 years ago.     The following portions of the patient's history were reviewed and updated as appropriate: allergies, current medications, past family history, past medical history, past social history, past surgical history and problem list.   Prior to Admission medications    Medication Sig Start Date End Date Taking? Authorizing Provider   amLODIPine (NORVASC) 5 MG tablet Take 10 mg by mouth Daily.   Yes Lynnette Valdes MD   aspirin 81 MG EC tablet Take 81 mg by mouth Daily.   Yes Lynnette Valdes MD   B Complex-C (B COMPLEX-B12-C IJ) Inject  as directed.   Yes Lynnette Valdes MD   clopidogrel (PLAVIX) 75 MG tablet Take 75 mg by mouth Daily.   Yes Lynnette Valdes MD   hydrochlorothiazide (MICROZIDE) 12.5 MG capsule Take 12.5 mg by mouth Daily.   Yes Lynnette Valdes MD   levothyroxine (SYNTHROID, LEVOTHROID) 75 MCG tablet Take 75 mcg by mouth Daily.   Yes Lynnette Valdes MD   metoprolol succinate XL (TOPROL-XL) 25 MG 24 hr tablet Take 25 mg by mouth Daily.   Yes Lynnette Valdes MD   nitroglycerin (NITROSTAT) 0.4 MG SL tablet Place 0.4 mg under the tongue Every 5 (Five) Minutes As Needed for chest pain. Take no more than 3 doses in 15 minutes.   Yes Lynnette Valdes MD   simvastatin (ZOCOR) 20 MG tablet Take 40 mg by mouth Every Night.   Yes Lynnette Valdes MD   vitamin B-12 (CYANOCOBALAMIN) 1000 MCG tablet Take 1,000 mcg by mouth Daily.   Yes Lynnette Valdes MD     Past Medical History:   Diagnosis Date   • Abdominal aortic aneurysm (CMS/HCC)    • Angina pectoris (CMS/HCC)    • Atherosclerosis of native coronary artery of native heart without angina pectoris    • CAD (coronary artery disease)    • Esophageal reflux    • GERD (gastroesophageal reflux disease)    • History of PTCA    • Hyperlipidemia    • Hypertension    • Hypertension, essential, benign    • Tobacco use  disorder        Review of Systems   Constitution: Negative for chills, decreased appetite, fever, malaise/fatigue, weight gain and weight loss.   HENT: Negative for nosebleeds.    Eyes: Negative for visual disturbance.   Cardiovascular: Negative for chest pain, dyspnea on exertion, leg swelling, near-syncope, orthopnea, palpitations, paroxysmal nocturnal dyspnea and syncope.   Respiratory: Negative for chest tightness, cough, hemoptysis, shortness of breath and snoring.    Endocrine: Negative for cold intolerance and heat intolerance.   Hematologic/Lymphatic: Negative for bleeding problem. Does not bruise/bleed easily.   Skin: Negative for rash.   Musculoskeletal: Negative for back pain, falls and muscle weakness.   Gastrointestinal: Negative for abdominal pain, constipation, diarrhea, heartburn, melena, nausea and vomiting.   Genitourinary: Negative for hematuria.   Neurological: Positive for light-headedness. Negative for dizziness and headaches.   Psychiatric/Behavioral: Negative for altered mental status.   Allergic/Immunologic: Negative for persistent infections.         ECG 12 Lead  Date/Time: 10/16/2018 11:25 AM  Performed by: MOMO FREED  Authorized by: MOMO FREED   Comparison: compared with previous ECG from 10/11/2017  Similar to previous ECG  Rhythm: sinus bradycardia               Objective:     Physical Exam   Constitutional: She is oriented to person, place, and time. She appears well-developed and well-nourished.   HENT:   Head: Normocephalic and atraumatic.   Eyes: Pupils are equal, round, and reactive to light.   Neck: Normal range of motion. Neck supple. No JVD present. Carotid bruit is not present.   Cardiovascular: Normal rate, regular rhythm, normal heart sounds and intact distal pulses.    Pulmonary/Chest: Effort normal and breath sounds normal.   Abdominal: Soft. Bowel sounds are normal.   Musculoskeletal: Normal range of motion.   Neurological: She is alert and oriented to person,  "place, and time. She has normal reflexes.   Skin: Skin is warm and dry.   Psychiatric: She has a normal mood and affect. Her behavior is normal. Judgment and thought content normal.     Blood pressure 123/62, pulse 58, height 152.4 cm (60\"), weight 63.5 kg (140 lb).      Lab Review:       Assessment:          Diagnosis Plan   1. Abdominal aortic aneurysm (AAA) without rupture (CMS/HCC)  US aorta limited   2. Coronary artery disease involving native coronary artery of native heart without angina pectoris     3. Essential hypertension     4. Mixed hyperlipidemia            Plan:       1. AAA - will check ultrasound of aorta  2. Coronary ARtery Disease- no clinical evidence of ischemia. On plavix and aspirin without issue.  3. Blood Pressure controlled- on BB, HCTZ and CCB- managed by PCP  4. Mixed hyperlipidemia- managed by PCP- on statin.          "

## 2018-10-17 ENCOUNTER — OFFICE VISIT (OUTPATIENT)
Dept: RETAIL CLINIC | Facility: CLINIC | Age: 83
End: 2018-10-17

## 2018-10-17 DIAGNOSIS — Z23 NEED FOR IMMUNIZATION AGAINST INFLUENZA: Primary | ICD-10-CM

## 2018-10-22 ENCOUNTER — HOSPITAL ENCOUNTER (OUTPATIENT)
Dept: ULTRASOUND IMAGING | Facility: HOSPITAL | Age: 83
Discharge: HOME | End: 2018-10-22
Admitting: NURSE PRACTITIONER

## 2018-10-22 DIAGNOSIS — I71.40 ABDOMINAL AORTIC ANEURYSM (AAA) WITHOUT RUPTURE (HCC): ICD-10-CM

## 2018-10-22 PROCEDURE — 76775 US EXAM ABDO BACK WALL LIM: CPT

## 2019-09-06 ENCOUNTER — HOSPITAL ENCOUNTER (OUTPATIENT)
Dept: WOMENS IMAGING | Age: 84
Discharge: HOME | End: 2019-09-06
Payer: MEDICARE

## 2019-09-06 DIAGNOSIS — Z12.39 BREAST CANCER SCREENING: ICD-10-CM

## 2019-09-06 PROCEDURE — 77063 BREAST TOMOSYNTHESIS BI: CPT

## 2019-10-16 ENCOUNTER — OFFICE VISIT (OUTPATIENT)
Dept: CARDIOLOGY | Facility: CLINIC | Age: 84
End: 2019-10-16

## 2019-10-16 VITALS
HEART RATE: 67 BPM | BODY MASS INDEX: 28.27 KG/M2 | SYSTOLIC BLOOD PRESSURE: 132 MMHG | DIASTOLIC BLOOD PRESSURE: 61 MMHG | WEIGHT: 144 LBS | HEIGHT: 60 IN

## 2019-10-16 DIAGNOSIS — I25.10 CORONARY ARTERY DISEASE INVOLVING NATIVE CORONARY ARTERY OF NATIVE HEART WITHOUT ANGINA PECTORIS: Primary | ICD-10-CM

## 2019-10-16 DIAGNOSIS — R00.2 PALPITATIONS: ICD-10-CM

## 2019-10-16 DIAGNOSIS — E78.2 MIXED HYPERLIPIDEMIA: ICD-10-CM

## 2019-10-16 DIAGNOSIS — I10 ESSENTIAL HYPERTENSION: ICD-10-CM

## 2019-10-16 PROCEDURE — 99213 OFFICE O/P EST LOW 20 MIN: CPT | Performed by: INTERNAL MEDICINE

## 2019-10-16 PROCEDURE — 93000 ELECTROCARDIOGRAM COMPLETE: CPT | Performed by: INTERNAL MEDICINE

## 2019-10-16 RX ORDER — CLONIDINE HYDROCHLORIDE 0.1 MG/1
0.1 TABLET ORAL AS NEEDED
COMMUNITY
End: 2021-09-09

## 2019-10-16 NOTE — PROGRESS NOTES
"Subjective    Alka D Maria Isabel is a 84 y.o. female. Fu of ihd and risks    History of Present Illness     IHD:  Is active but stamina is slowly declining. She is doing her house and yard work without sscp. She is compliant with meds that are well tolerated. She has had some \"fluttering\" for the past 2-3 weeks mostly noted when she lays down at night. EKG is nsc today. She notes extreme fatigue lately but good appetite and no sscp    HTN:  Recently had meds adjusted by pcp and now home checks are \"130-140/60's\" and no ligh headedness    HLD:  Is on a mod potent statin without myalgias and get good checks with her pcp but does not know her numbers.        The following portions of the patient's history were reviewed and updated as appropriate: allergies, current medications, past family history, past medical history, past social history, past surgical history and problem list.    Patient Active Problem List   Diagnosis   • CAD (coronary artery disease)   • Hypertension   • Hyperlipidemia   •        No Known Allergies    Family History   Problem Relation Age of Onset   • No Known Problems Mother    • Heart disease Father    • Hyperlipidemia Father    • Hypertension Father    • Stroke Father        Social History     Socioeconomic History   • Marital status: Unknown     Spouse name: Not on file   • Number of children: Not on file   • Years of education: Not on file   • Highest education level: Not on file   Tobacco Use   • Smoking status: Current Every Day Smoker     Packs/day: 0.50     Types: Cigarettes   • Smokeless tobacco: Never Used   • Tobacco comment: 8-10 ciggs   Substance and Sexual Activity   • Alcohol use: No   • Drug use: No         Current Outpatient Medications:   •  amLODIPine (NORVASC) 5 MG tablet, Take 10 mg by mouth Daily., Disp: , Rfl:   •  aspirin 81 MG EC tablet, Take 81 mg by mouth Daily., Disp: , Rfl:   •  Calcium Carb-Cholecalciferol (CALCIUM/VITAMIN D PO), Take  by mouth., Disp: , Rfl:   •  " "cloNIDine (CATAPRES) 0.1 MG tablet, Take 0.1 mg by mouth 2 (Two) Times a Day., Disp: , Rfl:   •  clopidogrel (PLAVIX) 75 MG tablet, Take 75 mg by mouth Daily., Disp: , Rfl:   •  hydrochlorothiazide (MICROZIDE) 12.5 MG capsule, Take 25 mg by mouth Daily., Disp: , Rfl:   •  levothyroxine (SYNTHROID, LEVOTHROID) 75 MCG tablet, Take 88 mcg by mouth Daily., Disp: , Rfl:   •  metoprolol tartrate (LOPRESSOR) 25 MG tablet, Take 25 mg by mouth Daily., Disp: , Rfl:   •  nitroglycerin (NITROSTAT) 0.4 MG SL tablet, Place 0.4 mg under the tongue Every 5 (Five) Minutes As Needed for chest pain. Take no more than 3 doses in 15 minutes., Disp: , Rfl:   •  simvastatin (ZOCOR) 20 MG tablet, Take 40 mg by mouth Every Night., Disp: , Rfl:   •  B Complex-C (B COMPLEX-B12-C IJ), Inject  as directed., Disp: , Rfl:   •  metoprolol succinate XL (TOPROL-XL) 25 MG 24 hr tablet, Take 25 mg by mouth Daily., Disp: , Rfl:   •  vitamin B-12 (CYANOCOBALAMIN) 1000 MCG tablet, Take 1,000 mcg by mouth Daily., Disp: , Rfl:     Past Surgical History:   Procedure Laterality Date   • APPENDECTOMY     • CARDIAC CATHETERIZATION     • CHOLECYSTECTOMY     • HYSTERECTOMY      total       Review of Systems   Constitutional: Positive for fatigue. Negative for fever and unexpected weight change.   Respiratory: Negative for apnea, chest tightness and shortness of breath.    Cardiovascular: Positive for palpitations. Negative for chest pain and leg swelling.   Gastrointestinal: Negative for abdominal pain and blood in stool.   Genitourinary: Negative for dysuria and hematuria.   Musculoskeletal: Positive for arthralgias. Negative for myalgias.   Neurological: Negative for weakness and light-headedness.   Psychiatric/Behavioral: Negative for sleep disturbance.       /61   Pulse 67   Ht 152.4 cm (60\")   Wt 65.3 kg (144 lb)   BMI 28.12 kg/m²   Procedures    Objective   Physical Exam   Constitutional: She is oriented to person, place, and time. She appears " well-developed and well-nourished. No distress.   Elderly   HENT:   Head: Normocephalic.   Eyes: Pupils are equal, round, and reactive to light.   Neck: No thyromegaly present.   Cardiovascular: Normal rate, regular rhythm, normal heart sounds and intact distal pulses. Exam reveals no friction rub.   No murmur heard.  Pulmonary/Chest: Effort normal and breath sounds normal. No stridor. No respiratory distress. She has no wheezes. She has no rales.   Abdominal: Soft. Bowel sounds are normal. She exhibits no distension and no mass. There is no tenderness. There is no guarding.   Musculoskeletal: She exhibits no edema or tenderness.   Neurological: She is alert and oriented to person, place, and time.   Skin: Skin is warm and dry. She is not diaphoretic.   Psychiatric: She has a normal mood and affect.       Assessment/Plan   Alka was seen today for coronary artery disease, hypertension and hyperlipidemia.    Diagnoses and all orders for this visit:    Coronary artery disease involving native coronary artery of native heart without angina pectoris  Comments:  no angina  Orders:  -     ECG 12 Lead    Mixed hyperlipidemia  Comments:  on statin ok    Essential hypertension  Comments:  controlled    Palpitations  Comments:  new symptom  Orders:  -     Holter Monitor - 48 Hour                 Return in about 1 year (around 10/16/2020) for Next scheduled follow up.  Orders Placed This Encounter   Procedures   • Holter Monitor - 48 Hour     48 hr holter     Order Specific Question:   Reason for exam?     Answer:   Palpitations   • ECG 12 Lead     Order Specific Question:   Reason for Exam:     Answer:   [email or web address].[email or web address]

## 2020-07-15 ENCOUNTER — OFFICE VISIT (OUTPATIENT)
Dept: INTERNAL MEDICINE | Facility: CLINIC | Age: 85
End: 2020-07-15

## 2020-07-15 VITALS
SYSTOLIC BLOOD PRESSURE: 146 MMHG | TEMPERATURE: 98.6 F | OXYGEN SATURATION: 99 % | WEIGHT: 146 LBS | HEART RATE: 58 BPM | BODY MASS INDEX: 28.66 KG/M2 | HEIGHT: 60 IN | DIASTOLIC BLOOD PRESSURE: 58 MMHG

## 2020-07-15 DIAGNOSIS — I10 ESSENTIAL HYPERTENSION: Primary | ICD-10-CM

## 2020-07-15 DIAGNOSIS — E03.4 HYPOTHYROIDISM DUE TO ACQUIRED ATROPHY OF THYROID: ICD-10-CM

## 2020-07-15 DIAGNOSIS — R01.1 GRADE 2 OUT OF 6 INTENSITY MURMUR: ICD-10-CM

## 2020-07-15 DIAGNOSIS — E78.2 MIXED HYPERLIPIDEMIA: ICD-10-CM

## 2020-07-15 PROBLEM — E53.8 VITAMIN B12 DEFICIENCY: Status: ACTIVE | Noted: 2017-08-01

## 2020-07-15 PROBLEM — L40.9 PSORIASIS: Status: ACTIVE | Noted: 2018-02-05

## 2020-07-15 PROBLEM — E03.9 HYPOTHYROIDISM: Status: ACTIVE | Noted: 2017-08-01

## 2020-07-15 PROCEDURE — 99214 OFFICE O/P EST MOD 30 MIN: CPT | Performed by: INTERNAL MEDICINE

## 2020-07-15 RX ORDER — MULTIPLE VITAMINS W/ MINERALS TAB 9MG-400MCG
1 TAB ORAL DAILY
COMMUNITY
End: 2021-09-20

## 2020-07-15 NOTE — PROGRESS NOTES
Subjective   Alka Nicolas is a 85 y.o. female.   Chief Complaint   Patient presents with   • Hypertension     6 MONTH FOLLOW UP   • Hyperlipidemia     6 MONTH FOLLOW UP       Is here for routine follow-up she sees Dr. Ratliff annually.  She has a history of hypertension hypercholesterolemia.  I asked her specifically about her most recent echocardiogram and she is not had an echocardiogram.       The following portions of the patient's history were reviewed and updated as appropriate: allergies, current medications, past family history, past medical history, past social history, past surgical history and problem list.    Review of Systems   Constitutional: Negative for activity change, appetite change, fatigue, fever, unexpected weight gain and unexpected weight loss.   HENT: Negative for swollen glands, trouble swallowing and voice change.    Eyes: Negative for blurred vision and visual disturbance.   Respiratory: Negative for cough and shortness of breath.    Cardiovascular: Negative for chest pain, palpitations and leg swelling.   Gastrointestinal: Negative for abdominal pain, constipation, diarrhea, nausea, vomiting and indigestion.   Endocrine: Negative for cold intolerance, heat intolerance, polydipsia and polyphagia.   Genitourinary: Negative for dysuria and frequency.   Musculoskeletal: Negative for arthralgias, back pain, joint swelling and neck pain.   Skin: Negative for color change, rash and skin lesions.   Neurological: Negative for dizziness, weakness, headache, memory problem and confusion.   Hematological: Does not bruise/bleed easily.   Psychiatric/Behavioral: Negative for agitation, hallucinations and suicidal ideas. The patient is not nervous/anxious.        Objective   Past Medical History:   Diagnosis Date   • Abdominal aortic aneurysm (CMS/HCC)    • Angina pectoris (CMS/HCC)    • Atherosclerosis of native coronary artery of native heart without angina pectoris    • CAD (coronary artery  disease)    • Esophageal reflux    • GERD (gastroesophageal reflux disease)    • History of PTCA    • Hyperlipidemia    • Hypertension    • Hypertension, essential, benign    • Tobacco use disorder       Past Surgical History:   Procedure Laterality Date   • APPENDECTOMY     • CARDIAC CATHETERIZATION     • CHOLECYSTECTOMY     • HYSTERECTOMY      total        Current Outpatient Medications:   •  amLODIPine (NORVASC) 5 MG tablet, Take 10 mg by mouth Daily., Disp: , Rfl:   •  aspirin 81 MG EC tablet, Take 81 mg by mouth Daily., Disp: , Rfl:   •  Calcium Carb-Cholecalciferol (CALCIUM/VITAMIN D PO), Take  by mouth., Disp: , Rfl:   •  cloNIDine (CATAPRES) 0.1 MG tablet, Take 0.1 mg by mouth 2 (Two) Times a Day., Disp: , Rfl:   •  clopidogrel (PLAVIX) 75 MG tablet, Take 75 mg by mouth Daily., Disp: , Rfl:   •  hydrochlorothiazide (MICROZIDE) 12.5 MG capsule, Take 25 mg by mouth Daily., Disp: , Rfl:   •  levothyroxine (SYNTHROID, LEVOTHROID) 75 MCG tablet, Take 88 mcg by mouth Daily., Disp: , Rfl:   •  metoprolol succinate XL (TOPROL-XL) 25 MG 24 hr tablet, Take 25 mg by mouth Daily., Disp: , Rfl:   •  metoprolol tartrate (LOPRESSOR) 25 MG tablet, Take 25 mg by mouth Daily., Disp: , Rfl:   •  Multiple Vitamins-Minerals (MULTIVITAMIN WITH MINERALS) tablet tablet, Take 1 tablet by mouth Daily., Disp: , Rfl:   •  nitroglycerin (NITROSTAT) 0.4 MG SL tablet, Place 0.4 mg under the tongue Every 5 (Five) Minutes As Needed for chest pain. Take no more than 3 doses in 15 minutes., Disp: , Rfl:   •  simvastatin (ZOCOR) 20 MG tablet, Take 40 mg by mouth Every Night., Disp: , Rfl:   •  B Complex-C (B COMPLEX-B12-C IJ), Inject  as directed., Disp: , Rfl:   •  vitamin B-12 (CYANOCOBALAMIN) 1000 MCG tablet, Take 1,000 mcg by mouth Daily., Disp: , Rfl:      Vitals:    07/15/20 1124   BP: 146/58   Pulse: 58   Temp: 98.6 °F (37 °C)   SpO2: 99%         07/15/20  1124   Weight: 66.2 kg (146 lb)     Patient's Body mass index is 28.51 kg/m². BMI  is above normal parameters. Recommendations include: exercise counseling and nutrition counseling.      Physical Exam   Constitutional: She is oriented to person, place, and time. She appears well-developed and well-nourished.   HENT:   Head: Normocephalic and atraumatic.   Right Ear: External ear normal.   Left Ear: External ear normal.   Nose: Nose normal.   Mouth/Throat: Oropharynx is clear and moist.   Eyes: Pupils are equal, round, and reactive to light. Conjunctivae and EOM are normal.   Neck: Normal range of motion. Neck supple. No thyromegaly present.   Cardiovascular: Normal rate, regular rhythm and intact distal pulses.   Murmur heard.   Systolic murmur is present with a grade of 2/6.   No diastolic murmur is present.  Pulmonary/Chest: Effort normal and breath sounds normal.   Abdominal: Soft. Bowel sounds are normal.   Musculoskeletal:   She has nodules in her MCPs DIPs and PIPs   Lymphadenopathy:     She has no cervical adenopathy.   Neurological: She is alert and oriented to person, place, and time.   Skin: Skin is warm and dry.   Psychiatric: She has a normal mood and affect. Her behavior is normal. Thought content normal.   Nursing note and vitals reviewed.            Assessment/Plan   Diagnoses and all orders for this visit:    1. Essential hypertension (Primary)    2. Mixed hyperlipidemia    3. Hypothyroidism due to acquired atrophy of thyroid    Patient has a murmur not noted on her previous cardiology evaluation I do not see a echocardiogram.  I am noting a significant gradient from her systolic to her diastolic reading the murmur is best heard in her right sternal border it does not radiate suspect she has some aortic sclerosis possibly aortic stenosis I will defer work-up or evaluation to a cardiologist.  Patient was informed of the murmur

## 2020-07-16 LAB
ALT SERPL-CCNC: 12 U/L (ref 1–33)
AST SERPL-CCNC: 13 U/L (ref 1–32)
BUN SERPL-MCNC: 19 MG/DL (ref 8–23)
BUN/CREAT SERPL: 18.6 (ref 7–25)
CALCIUM SERPL-MCNC: 9.2 MG/DL (ref 8.6–10.5)
CHLORIDE SERPL-SCNC: 95 MMOL/L (ref 98–107)
CHOLEST SERPL-MCNC: 137 MG/DL (ref 0–200)
CO2 SERPL-SCNC: 26.7 MMOL/L (ref 22–29)
CREAT SERPL-MCNC: 1.02 MG/DL (ref 0.57–1)
GLUCOSE SERPL-MCNC: 83 MG/DL (ref 65–99)
HDLC SERPL-MCNC: 54 MG/DL (ref 40–60)
LDLC SERPL CALC-MCNC: 55 MG/DL (ref 0–100)
POTASSIUM SERPL-SCNC: 3.2 MMOL/L (ref 3.5–5.2)
SODIUM SERPL-SCNC: 136 MMOL/L (ref 136–145)
TRIGL SERPL-MCNC: 138 MG/DL (ref 0–150)
VLDLC SERPL CALC-MCNC: 27.6 MG/DL

## 2020-07-16 RX ORDER — POTASSIUM CHLORIDE 750 MG/1
10 TABLET, FILM COATED, EXTENDED RELEASE ORAL DAILY
Qty: 30 TABLET | Refills: 11 | Status: SHIPPED | OUTPATIENT
Start: 2020-07-16 | End: 2021-10-11

## 2020-08-19 PROCEDURE — U0003 INFECTIOUS AGENT DETECTION BY NUCLEIC ACID (DNA OR RNA); SEVERE ACUTE RESPIRATORY SYNDROME CORONAVIRUS 2 (SARS-COV-2) (CORONAVIRUS DISEASE [COVID-19]), AMPLIFIED PROBE TECHNIQUE, MAKING USE OF HIGH THROUGHPUT TECHNOLOGIES AS DESCRIBED BY CMS-2020-01-R: HCPCS | Performed by: NURSE PRACTITIONER

## 2020-09-16 ENCOUNTER — OFFICE VISIT (OUTPATIENT)
Dept: CARDIOLOGY | Facility: CLINIC | Age: 85
End: 2020-09-16

## 2020-09-16 VITALS
HEIGHT: 60 IN | DIASTOLIC BLOOD PRESSURE: 58 MMHG | HEART RATE: 60 BPM | WEIGHT: 147 LBS | BODY MASS INDEX: 28.86 KG/M2 | SYSTOLIC BLOOD PRESSURE: 105 MMHG

## 2020-09-16 DIAGNOSIS — E78.2 MIXED HYPERLIPIDEMIA: Primary | ICD-10-CM

## 2020-09-16 DIAGNOSIS — R00.2 PALPITATIONS: ICD-10-CM

## 2020-09-16 DIAGNOSIS — R01.1 GRADE 1 OUT OF 6 INTENSITY MURMUR: ICD-10-CM

## 2020-09-16 DIAGNOSIS — I25.10 CORONARY ARTERY DISEASE INVOLVING NATIVE CORONARY ARTERY OF NATIVE HEART WITHOUT ANGINA PECTORIS: ICD-10-CM

## 2020-09-16 DIAGNOSIS — I10 ESSENTIAL HYPERTENSION: ICD-10-CM

## 2020-09-16 PROCEDURE — 99213 OFFICE O/P EST LOW 20 MIN: CPT | Performed by: INTERNAL MEDICINE

## 2020-09-16 PROCEDURE — 93000 ELECTROCARDIOGRAM COMPLETE: CPT | Performed by: INTERNAL MEDICINE

## 2020-09-16 RX ORDER — HYDROCORTISONE 25 MG/G
CREAM TOPICAL AS NEEDED
COMMUNITY

## 2020-09-16 RX ORDER — CLOBETASOL PROPIONATE 0.5 MG/G
CREAM TOPICAL 2 TIMES DAILY
COMMUNITY

## 2020-09-16 NOTE — PROGRESS NOTES
Subjective    Alka Nicolas is a 85 y.o. female. Fu of ihd and risks    History of Present Illness     IHD:  Since her LOV she has noted a marked and progressive decline in stamina due to engle and exertional cp. 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 She also has some cp at rest. The cp is a pressure sensation that relieves with SL NTG in 5-10 mins. EKG today has a longer MO interval and no PAC's. She relates compliance with her meds that are stable. Overall energy is less.    HLD:  She is on a potent statin with good control - lipids checked 7/15/20 show LDL=55, HDL=54 and AWE=609    HTN:  meds are stable and all clinic checks are to goal or close.    PAC's / PVC's:  She is not noting palpitations any longer.    The following portions of the patient's history were reviewed and updated as appropriate: allergies, current medications, past family history, past medical history, past social history, past surgical history and problem list.    Patient Active Problem List   Diagnosis   • Coronary artery disease involving native heart without angina pectoris   • Essential hypertension   • Hyperlipidemia   • Palpitations   • Hypothyroidism   • Psoriasis   • Vitamin B12 deficiency   • Grade 1 out of 6 intensity murmur       No Known Allergies    Family History   Problem Relation Age of Onset   • Diabetes Mother    • Heart disease Father    • Hyperlipidemia Father    • Hypertension Father    • Stroke Father        Social History     Socioeconomic History   • Marital status: Unknown     Spouse name: Not on file   • Number of children: Not on file   • Years of education: Not on file   • Highest education level: Not on file   Tobacco Use   • Smoking status: Former Smoker     Packs/day: 0.50     Types: Cigarettes     Quit date: 2020     Years since quittin.4   • Smokeless tobacco: Never Used   Substance and Sexual Activity   • Alcohol use: No   • Drug use: No   • Sexual activity: Defer         Current  Outpatient Medications:   •  amLODIPine (NORVASC) 5 MG tablet, Take 10 mg by mouth Daily., Disp: , Rfl:   •  aspirin 81 MG EC tablet, Take 81 mg by mouth Daily., Disp: , Rfl:   •  Calcium Carb-Cholecalciferol (CALCIUM/VITAMIN D PO), Take  by mouth Daily., Disp: , Rfl:   •  clobetasol (TEMOVATE) 0.05 % cream, Apply  topically to the appropriate area as directed 2 (Two) Times a Day., Disp: , Rfl:   •  cloNIDine (CATAPRES) 0.1 MG tablet, Take 0.1 mg by mouth 2 (Two) Times a Day., Disp: , Rfl:   •  clopidogrel (PLAVIX) 75 MG tablet, Take 75 mg by mouth Daily., Disp: , Rfl:   •  hydrochlorothiazide (MICROZIDE) 12.5 MG capsule, Take 25 mg by mouth Daily., Disp: , Rfl:   •  Hydrocortisone, Perianal, (ANUSOL-HC) 2.5 % rectal cream, Insert  into the rectum 2 (Two) Times a Day., Disp: , Rfl:   •  levothyroxine (SYNTHROID, LEVOTHROID) 75 MCG tablet, Take 88 mcg by mouth Daily., Disp: , Rfl:   •  metoprolol tartrate (LOPRESSOR) 25 MG tablet, Take 25 mg by mouth Daily., Disp: , Rfl:   •  Multiple Vitamins-Minerals (MULTIVITAMIN WITH MINERALS) tablet tablet, Take 1 tablet by mouth Daily., Disp: , Rfl:   •  nitroglycerin (NITROSTAT) 0.4 MG SL tablet, Place 0.4 mg under the tongue Every 5 (Five) Minutes As Needed for chest pain. Take no more than 3 doses in 15 minutes., Disp: , Rfl:   •  potassium chloride (K-DUR) 10 MEQ CR tablet, Take 1 tablet by mouth Daily., Disp: 30 tablet, Rfl: 11  •  simvastatin (ZOCOR) 20 MG tablet, Take 40 mg by mouth Every Night., Disp: , Rfl:   •  azithromycin (ZITHROMAX) 250 MG tablet, Take 2 tablets the first day, then 1 tablet daily for 4 days., Disp: 6 tablet, Rfl: 0  •  B Complex-C (B COMPLEX-B12-C IJ), Inject  as directed., Disp: , Rfl:   •  metoprolol succinate XL (TOPROL-XL) 25 MG 24 hr tablet, Take 25 mg by mouth Daily., Disp: , Rfl:   •  vitamin B-12 (CYANOCOBALAMIN) 1000 MCG tablet, Take 1,000 mcg by mouth Daily., Disp: , Rfl:     Past Surgical History:   Procedure Laterality Date   •  "APPENDECTOMY     • CARDIAC CATHETERIZATION     • CHOLECYSTECTOMY     • HYSTERECTOMY      total       Review of Systems   Constitutional: Positive for activity change and fatigue. Negative for appetite change, fever and unexpected weight change.   Respiratory: Positive for shortness of breath. Negative for apnea and chest tightness.    Cardiovascular: Positive for chest pain. Negative for palpitations and leg swelling.   Gastrointestinal: Negative for abdominal pain and blood in stool.   Genitourinary: Negative for dysuria and hematuria.   Musculoskeletal: Positive for arthralgias. Negative for myalgias.   Neurological: Negative for weakness and light-headedness.   Psychiatric/Behavioral: Negative for sleep disturbance.       /58   Pulse 60   Ht 152.4 cm (60\")   Wt 66.7 kg (147 lb)   BMI 28.71 kg/m²   Procedures    Objective   Physical Exam  Constitutional:       Appearance: Normal appearance. She is normal weight.   HENT:      Head: Normocephalic.      Nose: Nose normal.   Eyes:      Pupils: Pupils are equal, round, and reactive to light.   Cardiovascular:      Rate and Rhythm: Normal rate and regular rhythm.      Heart sounds: Murmur present. Systolic murmur present with a grade of 1/6. No friction rub.   Pulmonary:      Effort: Pulmonary effort is normal.      Breath sounds: Normal breath sounds. No wheezing or rhonchi.   Musculoskeletal:      Right lower leg: No edema.      Left lower leg: No edema.   Skin:     General: Skin is warm and dry.   Neurological:      General: No focal deficit present.      Mental Status: She is alert.   Psychiatric:         Mood and Affect: Mood normal.         Assessment/Plan   Alka was seen today for coronary artery disease, hypertension and hyperlipidemia.    Diagnoses and all orders for this visit:    Mixed hyperlipidemia  Comments:  controlled    Coronary artery disease involving native coronary artery of native heart without angina pectoris  Comments:  poss uap " now  Orders:  -     ECG 12 Lead  -     Stress Test With Myocardial Perfusion (1 Day); Future    Essential hypertension  Comments:  controlled    Palpitations  Comments:  improved    Grade 1 out of 6 intensity murmur  Comments:  new finding  Orders:  -     Adult Transthoracic Echo Complete W/ Cont if Necessary Per Protocol                 Return in about 3 months (around 12/16/2020) for Next scheduled follow up WITH [facility].  Orders Placed This Encounter   Procedures   • Stress Test With Myocardial Perfusion (1 Day)     Standing Status:   Future     Standing Expiration Date:   9/16/2021     Order Specific Question:   What stress agent will be used?     Answer:   Regadenoson (Lexiscan)     Order Specific Question:   Difficulty walking criteria?     Answer:   Musculoskeletal (hips, knees, feet, back, amputee)     Order Specific Question:   Reason for exam?     Answer:   Angina     Order Specific Question:   Reason for exam?     Answer:   Known Coronary Artery Disease   • ECG 12 Lead     Order Specific Question:   Reason for Exam:     Answer:   [email or web address]   • Adult Transthoracic Echo Complete W/ Cont if Necessary Per Protocol     Order Specific Question:   Reason for exam?     Answer:   Murmur or Click     Order Specific Question:   Reason for exam?     Answer:   Dyspnea

## 2020-09-23 ENCOUNTER — FLU SHOT (OUTPATIENT)
Dept: INTERNAL MEDICINE | Facility: CLINIC | Age: 85
End: 2020-09-23

## 2020-09-23 DIAGNOSIS — Z23 NEED FOR INFLUENZA VACCINATION: Primary | ICD-10-CM

## 2020-09-24 PROCEDURE — G0008 ADMIN INFLUENZA VIRUS VAC: HCPCS | Performed by: INTERNAL MEDICINE

## 2020-09-24 PROCEDURE — 90694 VACC AIIV4 NO PRSRV 0.5ML IM: CPT | Performed by: INTERNAL MEDICINE

## 2020-10-09 ENCOUNTER — HOSPITAL ENCOUNTER (OUTPATIENT)
Dept: CARDIOLOGY | Facility: HOSPITAL | Age: 85
Discharge: HOME | End: 2020-10-09

## 2020-10-09 VITALS
BODY MASS INDEX: 28.66 KG/M2 | DIASTOLIC BLOOD PRESSURE: 82 MMHG | HEART RATE: 58 BPM | WEIGHT: 146 LBS | HEIGHT: 60 IN | SYSTOLIC BLOOD PRESSURE: 160 MMHG

## 2020-10-09 VITALS
BODY MASS INDEX: 28.86 KG/M2 | HEIGHT: 60 IN | DIASTOLIC BLOOD PRESSURE: 62 MMHG | WEIGHT: 147 LBS | SYSTOLIC BLOOD PRESSURE: 111 MMHG

## 2020-10-09 DIAGNOSIS — I25.10 CORONARY ARTERY DISEASE INVOLVING NATIVE CORONARY ARTERY OF NATIVE HEART WITHOUT ANGINA PECTORIS: ICD-10-CM

## 2020-10-09 LAB
BH CV ECHO MEAS - AO MAX PG (FULL): 7.7 MMHG
BH CV ECHO MEAS - AO MAX PG: 11 MMHG
BH CV ECHO MEAS - AO MEAN PG (FULL): 4 MMHG
BH CV ECHO MEAS - AO MEAN PG: 5 MMHG
BH CV ECHO MEAS - AO ROOT AREA (BSA CORRECTED): 1.5
BH CV ECHO MEAS - AO ROOT AREA: 4.9 CM^2
BH CV ECHO MEAS - AO ROOT DIAM: 2.5 CM
BH CV ECHO MEAS - AO V2 MAX: 166 CM/SEC
BH CV ECHO MEAS - AO V2 MEAN: 102 CM/SEC
BH CV ECHO MEAS - AO V2 VTI: 39.7 CM
BH CV ECHO MEAS - AVA(I,A): 1.7 CM^2
BH CV ECHO MEAS - AVA(I,D): 1.7 CM^2
BH CV ECHO MEAS - AVA(V,A): 1.5 CM^2
BH CV ECHO MEAS - AVA(V,D): 1.5 CM^2
BH CV ECHO MEAS - BSA(HAYCOCK): 1.7 M^2
BH CV ECHO MEAS - BSA: 1.6 M^2
BH CV ECHO MEAS - BZI_BMI: 28.7 KILOGRAMS/M^2
BH CV ECHO MEAS - BZI_METRIC_HEIGHT: 152.4 CM
BH CV ECHO MEAS - BZI_METRIC_WEIGHT: 66.7 KG
BH CV ECHO MEAS - EDV(CUBED): 84 ML
BH CV ECHO MEAS - EDV(MOD-SP4): 89.3 ML
BH CV ECHO MEAS - EDV(TEICH): 86.8 ML
BH CV ECHO MEAS - EF(CUBED): 75.3 %
BH CV ECHO MEAS - EF(MOD-SP4): 71.2 %
BH CV ECHO MEAS - EF(TEICH): 67.4 %
BH CV ECHO MEAS - ESV(CUBED): 20.8 ML
BH CV ECHO MEAS - ESV(MOD-SP4): 25.7 ML
BH CV ECHO MEAS - ESV(TEICH): 28.3 ML
BH CV ECHO MEAS - FS: 37.2 %
BH CV ECHO MEAS - IVS/LVPW: 1.1
BH CV ECHO MEAS - IVSD: 0.86 CM
BH CV ECHO MEAS - LA DIMENSION: 3.2 CM
BH CV ECHO MEAS - LA/AO: 1.3
BH CV ECHO MEAS - LAT PEAK E' VEL: 6.3 CM/SEC
BH CV ECHO MEAS - LV DIASTOLIC VOL/BSA (35-75): 54.5 ML/M^2
BH CV ECHO MEAS - LV MASS(C)D: 110.4 GRAMS
BH CV ECHO MEAS - LV MASS(C)DI: 67.4 GRAMS/M^2
BH CV ECHO MEAS - LV MAX PG: 3.3 MMHG
BH CV ECHO MEAS - LV MEAN PG: 1 MMHG
BH CV ECHO MEAS - LV SYSTOLIC VOL/BSA (12-30): 15.7 ML/M^2
BH CV ECHO MEAS - LV V1 MAX: 90.6 CM/SEC
BH CV ECHO MEAS - LV V1 MEAN: 53.7 CM/SEC
BH CV ECHO MEAS - LV V1 VTI: 23.4 CM
BH CV ECHO MEAS - LVIDD: 4.4 CM
BH CV ECHO MEAS - LVIDS: 2.8 CM
BH CV ECHO MEAS - LVLD AP4: 7 CM
BH CV ECHO MEAS - LVLS AP4: 5.3 CM
BH CV ECHO MEAS - LVOT AREA (M): 2.8 CM^2
BH CV ECHO MEAS - LVOT AREA: 2.8 CM^2
BH CV ECHO MEAS - LVOT DIAM: 1.9 CM
BH CV ECHO MEAS - LVPWD: 0.77 CM
BH CV ECHO MEAS - MED PEAK E' VEL: 4.35 CM/SEC
BH CV ECHO MEAS - MV A MAX VEL: 75.4 CM/SEC
BH CV ECHO MEAS - MV DEC TIME: 0.2 SEC
BH CV ECHO MEAS - MV E MAX VEL: 86 CM/SEC
BH CV ECHO MEAS - MV E/A: 1.1
BH CV ECHO MEAS - SI(AO): 119 ML/M^2
BH CV ECHO MEAS - SI(CUBED): 38.6 ML/M^2
BH CV ECHO MEAS - SI(LVOT): 40.5 ML/M^2
BH CV ECHO MEAS - SI(MOD-SP4): 38.8 ML/M^2
BH CV ECHO MEAS - SI(TEICH): 35.7 ML/M^2
BH CV ECHO MEAS - SV(AO): 194.9 ML
BH CV ECHO MEAS - SV(CUBED): 63.2 ML
BH CV ECHO MEAS - SV(LVOT): 66.3 ML
BH CV ECHO MEAS - SV(MOD-SP4): 63.6 ML
BH CV ECHO MEAS - SV(TEICH): 58.5 ML
BH CV ECHO MEAS - TR MAX VEL: 220 CM/SEC
BH CV ECHO MEASUREMENTS AVERAGE E/E' RATIO: 16.15
LEFT ATRIUM VOLUME INDEX: 22.3 ML/M2
LEFT ATRIUM VOLUME: 36.5 CM3
MAXIMAL PREDICTED HEART RATE: 135 BPM
STRESS TARGET HR: 115 BPM

## 2020-10-09 PROCEDURE — 25010000002 PERFLUTREN 6.52 MG/ML SUSPENSION: Performed by: INTERNAL MEDICINE

## 2020-10-09 PROCEDURE — 25010000002 REGADENOSON 0.4 MG/5ML SOLUTION: Performed by: INTERNAL MEDICINE

## 2020-10-09 PROCEDURE — 0 TECHNETIUM SESTAMIBI: Performed by: INTERNAL MEDICINE

## 2020-10-09 PROCEDURE — A9500 TC99M SESTAMIBI: HCPCS | Performed by: INTERNAL MEDICINE

## 2020-10-09 PROCEDURE — 93306 TTE W/DOPPLER COMPLETE: CPT | Performed by: INTERNAL MEDICINE

## 2020-10-09 PROCEDURE — 25010000002 AMINOPHYLLINE PER 250 MG: Performed by: INTERNAL MEDICINE

## 2020-10-09 PROCEDURE — 93018 CV STRESS TEST I&R ONLY: CPT | Performed by: INTERNAL MEDICINE

## 2020-10-09 PROCEDURE — 78452 HT MUSCLE IMAGE SPECT MULT: CPT | Performed by: INTERNAL MEDICINE

## 2020-10-09 PROCEDURE — 78452 HT MUSCLE IMAGE SPECT MULT: CPT

## 2020-10-09 PROCEDURE — 93017 CV STRESS TEST TRACING ONLY: CPT

## 2020-10-09 PROCEDURE — 93306 TTE W/DOPPLER COMPLETE: CPT

## 2020-10-09 RX ORDER — AMINOPHYLLINE DIHYDRATE 25 MG/ML
100 INJECTION, SOLUTION INTRAVENOUS ONCE
Status: COMPLETED | OUTPATIENT
Start: 2020-10-09 | End: 2020-10-09

## 2020-10-09 RX ADMIN — TECHNETIUM TC 99M SESTAMIBI 1 DOSE: 1 INJECTION INTRAVENOUS at 08:44

## 2020-10-09 RX ADMIN — REGADENOSON 0.4 MG: 0.08 INJECTION, SOLUTION INTRAVENOUS at 10:15

## 2020-10-09 RX ADMIN — PERFLUTREN: 6.52 INJECTION, SUSPENSION INTRAVENOUS at 08:27

## 2020-10-09 RX ADMIN — AMINOPHYLLINE 100 MG: 25 INJECTION, SOLUTION INTRAVENOUS at 10:27

## 2020-10-09 RX ADMIN — TECHNETIUM TC 99M SESTAMIBI 1 DOSE: 1 INJECTION INTRAVENOUS at 10:10

## 2020-10-10 LAB
BH CV STRESS BP STAGE 1: NORMAL
BH CV STRESS COMMENTS STAGE 1: NORMAL
BH CV STRESS DOSE REGADENOSON STAGE 1: 0.4
BH CV STRESS DURATION MIN STAGE 1: 0
BH CV STRESS DURATION SEC STAGE 1: 10
BH CV STRESS HR STAGE 1: 81
BH CV STRESS PROTOCOL 1: NORMAL
BH CV STRESS RECOVERY BP: NORMAL MMHG
BH CV STRESS RECOVERY HR: 64 BPM
BH CV STRESS STAGE 1: 1
LV EF NUC BP: 78 %
MAXIMAL PREDICTED HEART RATE: 135 BPM
PERCENT MAX PREDICTED HR: 60 %
STRESS BASELINE BP: NORMAL MMHG
STRESS BASELINE HR: 58 BPM
STRESS PERCENT HR: 71 %
STRESS POST EXERCISE DUR SEC: 10 SEC
STRESS POST PEAK BP: NORMAL MMHG
STRESS POST PEAK HR: 81 BPM
STRESS TARGET HR: 115 BPM

## 2020-10-13 ENCOUNTER — TELEPHONE (OUTPATIENT)
Dept: CARDIOLOGY | Facility: CLINIC | Age: 85
End: 2020-10-13

## 2020-10-13 NOTE — TELEPHONE ENCOUNTER
This pt daughter Hui Baeza called inquiring more info about the pt results of Echo and stress test. A heart cath was mentioned but suggested a 3 mo f/u. She said her mother did not understand the results. She is wondering if you will call her and get a better perspective from you.  Pt has a f/u appt with on 12/16/20. Please call [phone]

## 2020-10-14 DIAGNOSIS — I20.0 UNSTABLE ANGINA (HCC): Primary | ICD-10-CM

## 2020-10-14 RX ORDER — ONDANSETRON 2 MG/ML
4 INJECTION INTRAMUSCULAR; INTRAVENOUS EVERY 6 HOURS PRN
Status: CANCELLED | OUTPATIENT
Start: 2020-10-14

## 2020-10-14 RX ORDER — LORAZEPAM 2 MG/ML
1 INJECTION INTRAMUSCULAR ONCE
Status: CANCELLED | OUTPATIENT
Start: 2020-10-14 | End: 2020-10-14

## 2020-10-14 RX ORDER — LIDOCAINE HYDROCHLORIDE 10 MG/ML
0.1 INJECTION, SOLUTION EPIDURAL; INFILTRATION; INTRACAUDAL; PERINEURAL ONCE AS NEEDED
Status: CANCELLED | OUTPATIENT
Start: 2020-10-14

## 2020-10-14 RX ORDER — NITROGLYCERIN 0.4 MG/1
0.4 TABLET SUBLINGUAL
Status: CANCELLED | OUTPATIENT
Start: 2020-10-14

## 2020-10-14 RX ORDER — SODIUM CHLORIDE 9 MG/ML
1-3 INJECTION, SOLUTION INTRAVENOUS CONTINUOUS
Status: CANCELLED | OUTPATIENT
Start: 2020-10-14

## 2020-10-14 NOTE — TELEPHONE ENCOUNTER
Spoke with daughter about results of stress. She will discuss with her mother and call back to let us know.

## 2020-10-15 ENCOUNTER — TRANSCRIBE ORDERS (OUTPATIENT)
Dept: ADMINISTRATIVE | Facility: HOSPITAL | Age: 85
End: 2020-10-15

## 2020-10-15 DIAGNOSIS — Z01.818 PREOP TESTING: Primary | ICD-10-CM

## 2020-10-19 ENCOUNTER — LAB (OUTPATIENT)
Dept: LAB | Facility: HOSPITAL | Age: 85
End: 2020-10-19

## 2020-10-19 PROCEDURE — 87635 SARS-COV-2 COVID-19 AMP PRB: CPT | Performed by: INTERNAL MEDICINE

## 2020-10-19 PROCEDURE — C9803 HOPD COVID-19 SPEC COLLECT: HCPCS | Performed by: INTERNAL MEDICINE

## 2020-10-20 LAB — SARS-COV-2 N GENE RESP QL NAA+PROBE: NOT DETECTED

## 2020-10-21 ENCOUNTER — HOSPITAL ENCOUNTER (OUTPATIENT)
Facility: HOSPITAL | Age: 85
Discharge: HOME | End: 2020-10-21
Attending: INTERNAL MEDICINE | Admitting: INTERNAL MEDICINE

## 2020-10-21 VITALS
TEMPERATURE: 97 F | SYSTOLIC BLOOD PRESSURE: 163 MMHG | HEART RATE: 63 BPM | RESPIRATION RATE: 17 BRPM | OXYGEN SATURATION: 97 % | HEIGHT: 60 IN | WEIGHT: 147.8 LBS | DIASTOLIC BLOOD PRESSURE: 80 MMHG | BODY MASS INDEX: 29.02 KG/M2

## 2020-10-21 DIAGNOSIS — I20.0 UNSTABLE ANGINA (HCC): ICD-10-CM

## 2020-10-21 LAB
ALBUMIN SERPL-MCNC: 4.6 G/DL (ref 3.5–5.2)
ALBUMIN/GLOB SERPL: 1.7 G/DL
ALP SERPL-CCNC: 109 U/L (ref 39–117)
ALT SERPL W P-5'-P-CCNC: 13 U/L (ref 1–33)
ANION GAP SERPL CALCULATED.3IONS-SCNC: 12 MMOL/L (ref 5–15)
AST SERPL-CCNC: 17 U/L (ref 1–32)
BASOPHILS # BLD AUTO: 0.05 10*3/MM3 (ref 0–0.2)
BASOPHILS NFR BLD AUTO: 0.6 % (ref 0–1.5)
BILIRUB SERPL-MCNC: 0.6 MG/DL (ref 0–1.2)
BUN SERPL-MCNC: 22 MG/DL (ref 8–23)
BUN/CREAT SERPL: 22.7 (ref 7–25)
CALCIUM SPEC-SCNC: 9.6 MG/DL (ref 8.6–10.5)
CHLORIDE SERPL-SCNC: 95 MMOL/L (ref 98–107)
CO2 SERPL-SCNC: 28 MMOL/L (ref 22–29)
CREAT SERPL-MCNC: 0.97 MG/DL (ref 0.57–1)
DEPRECATED RDW RBC AUTO: 37.3 FL (ref 37–54)
EOSINOPHIL # BLD AUTO: 0.62 10*3/MM3 (ref 0–0.4)
EOSINOPHIL NFR BLD AUTO: 7.7 % (ref 0.3–6.2)
ERYTHROCYTE [DISTWIDTH] IN BLOOD BY AUTOMATED COUNT: 12.2 % (ref 12.3–15.4)
GFR SERPL CREATININE-BSD FRML MDRD: 55 ML/MIN/1.73
GLOBULIN UR ELPH-MCNC: 2.7 GM/DL
GLUCOSE SERPL-MCNC: 103 MG/DL (ref 65–99)
HCT VFR BLD AUTO: 39.7 % (ref 34–46.6)
HGB BLD-MCNC: 14.1 G/DL (ref 12–15.9)
IMM GRANULOCYTES # BLD AUTO: 0.03 10*3/MM3 (ref 0–0.05)
IMM GRANULOCYTES NFR BLD AUTO: 0.4 % (ref 0–0.5)
LYMPHOCYTES # BLD AUTO: 2.17 10*3/MM3 (ref 0.7–3.1)
LYMPHOCYTES NFR BLD AUTO: 27.1 % (ref 19.6–45.3)
MCH RBC QN AUTO: 29.8 PG (ref 26.6–33)
MCHC RBC AUTO-ENTMCNC: 35.5 G/DL (ref 31.5–35.7)
MCV RBC AUTO: 83.9 FL (ref 79–97)
MONOCYTES # BLD AUTO: 0.62 10*3/MM3 (ref 0.1–0.9)
MONOCYTES NFR BLD AUTO: 7.7 % (ref 5–12)
NEUTROPHILS NFR BLD AUTO: 4.53 10*3/MM3 (ref 1.7–7)
NEUTROPHILS NFR BLD AUTO: 56.5 % (ref 42.7–76)
NRBC BLD AUTO-RTO: 0 /100 WBC (ref 0–0.2)
PLATELET # BLD AUTO: 224 10*3/MM3 (ref 140–450)
PMV BLD AUTO: 9.7 FL (ref 6–12)
POTASSIUM SERPL-SCNC: 3.3 MMOL/L (ref 3.5–5.2)
PROT SERPL-MCNC: 7.3 G/DL (ref 6–8.5)
RBC # BLD AUTO: 4.73 10*6/MM3 (ref 3.77–5.28)
SODIUM SERPL-SCNC: 135 MMOL/L (ref 136–145)
WBC # BLD AUTO: 8.02 10*3/MM3 (ref 3.4–10.8)

## 2020-10-21 PROCEDURE — 85025 COMPLETE CBC W/AUTO DIFF WBC: CPT | Performed by: INTERNAL MEDICINE

## 2020-10-21 PROCEDURE — 93458 L HRT ARTERY/VENTRICLE ANGIO: CPT | Performed by: INTERNAL MEDICINE

## 2020-10-21 PROCEDURE — 0 IOPAMIDOL PER 1 ML: Performed by: INTERNAL MEDICINE

## 2020-10-21 PROCEDURE — C1894 INTRO/SHEATH, NON-LASER: HCPCS | Performed by: INTERNAL MEDICINE

## 2020-10-21 PROCEDURE — 25010000002 FENTANYL CITRATE (PF) 100 MCG/2ML SOLUTION: Performed by: INTERNAL MEDICINE

## 2020-10-21 PROCEDURE — S0260 H&P FOR SURGERY: HCPCS | Performed by: INTERNAL MEDICINE

## 2020-10-21 PROCEDURE — 25010000002 DIPHENHYDRAMINE PER 50 MG: Performed by: INTERNAL MEDICINE

## 2020-10-21 PROCEDURE — 99152 MOD SED SAME PHYS/QHP 5/>YRS: CPT | Performed by: INTERNAL MEDICINE

## 2020-10-21 PROCEDURE — 80053 COMPREHEN METABOLIC PANEL: CPT | Performed by: INTERNAL MEDICINE

## 2020-10-21 PROCEDURE — C1769 GUIDE WIRE: HCPCS | Performed by: INTERNAL MEDICINE

## 2020-10-21 PROCEDURE — 25010000002 MIDAZOLAM HCL (PF) 5 MG/5ML SOLUTION: Performed by: INTERNAL MEDICINE

## 2020-10-21 PROCEDURE — L1830 KO IMMOB CANVAS LONG PRE OTS: HCPCS | Performed by: INTERNAL MEDICINE

## 2020-10-21 RX ORDER — SODIUM CHLORIDE 9 MG/ML
1 INJECTION, SOLUTION INTRAVENOUS CONTINUOUS
Status: DISCONTINUED | OUTPATIENT
Start: 2020-10-21 | End: 2020-10-21 | Stop reason: HOSPADM

## 2020-10-21 RX ORDER — FENTANYL CITRATE 50 UG/ML
INJECTION, SOLUTION INTRAMUSCULAR; INTRAVENOUS AS NEEDED
Status: DISCONTINUED | OUTPATIENT
Start: 2020-10-21 | End: 2020-10-21 | Stop reason: HOSPADM

## 2020-10-21 RX ORDER — SODIUM CHLORIDE 9 MG/ML
1-3 INJECTION, SOLUTION INTRAVENOUS CONTINUOUS
Status: DISCONTINUED | OUTPATIENT
Start: 2020-10-21 | End: 2020-10-21 | Stop reason: HOSPADM

## 2020-10-21 RX ORDER — LORAZEPAM 2 MG/ML
1 INJECTION INTRAMUSCULAR ONCE
Status: DISCONTINUED | OUTPATIENT
Start: 2020-10-21 | End: 2020-10-21 | Stop reason: HOSPADM

## 2020-10-21 RX ORDER — ACETAMINOPHEN 325 MG/1
650 TABLET ORAL EVERY 4 HOURS PRN
Status: DISCONTINUED | OUTPATIENT
Start: 2020-10-21 | End: 2020-10-21 | Stop reason: HOSPADM

## 2020-10-21 RX ORDER — LORAZEPAM 2 MG/ML
1 INJECTION INTRAMUSCULAR EVERY 6 HOURS PRN
Status: DISCONTINUED | OUTPATIENT
Start: 2020-10-21 | End: 2020-10-21 | Stop reason: HOSPADM

## 2020-10-21 RX ORDER — ONDANSETRON 2 MG/ML
4 INJECTION INTRAMUSCULAR; INTRAVENOUS EVERY 6 HOURS PRN
Status: DISCONTINUED | OUTPATIENT
Start: 2020-10-21 | End: 2020-10-21 | Stop reason: HOSPADM

## 2020-10-21 RX ORDER — LORAZEPAM 2 MG/ML
1 INJECTION INTRAMUSCULAR ONCE
Status: DISCONTINUED | OUTPATIENT
Start: 2020-10-21 | End: 2020-10-21

## 2020-10-21 RX ORDER — MIDAZOLAM HYDROCHLORIDE 1 MG/ML
INJECTION, SOLUTION INTRAMUSCULAR; INTRAVENOUS AS NEEDED
Status: DISCONTINUED | OUTPATIENT
Start: 2020-10-21 | End: 2020-10-21 | Stop reason: HOSPADM

## 2020-10-21 RX ORDER — DIPHENHYDRAMINE HYDROCHLORIDE 50 MG/ML
INJECTION INTRAMUSCULAR; INTRAVENOUS AS NEEDED
Status: DISCONTINUED | OUTPATIENT
Start: 2020-10-21 | End: 2020-10-21 | Stop reason: HOSPADM

## 2020-10-21 RX ORDER — LIDOCAINE HYDROCHLORIDE 10 MG/ML
0.1 INJECTION, SOLUTION EPIDURAL; INFILTRATION; INTRACAUDAL; PERINEURAL ONCE AS NEEDED
Status: DISCONTINUED | OUTPATIENT
Start: 2020-10-21 | End: 2020-10-21 | Stop reason: HOSPADM

## 2020-10-21 RX ORDER — CALCIUM CARBONATE 200(500)MG
2 TABLET,CHEWABLE ORAL 2 TIMES DAILY PRN
Status: DISCONTINUED | OUTPATIENT
Start: 2020-10-21 | End: 2020-10-21 | Stop reason: HOSPADM

## 2020-10-21 RX ORDER — DIPHENHYDRAMINE HCL 25 MG
25 CAPSULE ORAL EVERY 6 HOURS PRN
Status: DISCONTINUED | OUTPATIENT
Start: 2020-10-21 | End: 2020-10-21 | Stop reason: HOSPADM

## 2020-10-21 RX ORDER — ONDANSETRON 4 MG/1
4 TABLET, FILM COATED ORAL EVERY 6 HOURS PRN
Status: DISCONTINUED | OUTPATIENT
Start: 2020-10-21 | End: 2020-10-21 | Stop reason: HOSPADM

## 2020-10-21 RX ORDER — NITROGLYCERIN 0.4 MG/1
0.4 TABLET SUBLINGUAL
Status: DISCONTINUED | OUTPATIENT
Start: 2020-10-21 | End: 2020-10-21 | Stop reason: HOSPADM

## 2020-10-21 NOTE — H&P
[facility] - CARDIOLOGY  HISTORY AND PHYSICAL    Date of Admission: 10/21/2020  Primary Care Physician: Alexis Rubio MD    Subjective     Chief Complaint: VALENCIA AND SSCP    History of Present Illness  Has had remote cor stenting. For the past 2 mo has been having valencia and some chest tightness that is reminiscent of the cp she had prior to remote stenting. A Kassidy-Card showed intermediate probability  of ischemia in the LAD distribution and a cardiac cath has been recommended.      Review of Systems   Constitutional: Positive for activity change. Negative for appetite change, fatigue, fever and unexpected weight change.   HENT: Negative for congestion.    Eyes: Negative for visual disturbance.   Respiratory: Positive for shortness of breath. Negative for apnea and wheezing.    Cardiovascular: Positive for chest pain. Negative for palpitations and leg swelling.   Gastrointestinal: Negative for abdominal pain and vomiting.   Endocrine: Negative for cold intolerance and heat intolerance.   Genitourinary: Negative for difficulty urinating.   Musculoskeletal: Positive for arthralgias. Negative for myalgias.   Skin: Negative for rash.   Neurological: Negative for syncope.   Hematological: Does not bruise/bleed easily.   Psychiatric/Behavioral: Negative for sleep disturbance.        Otherwise complete ROS reviewed and negative except as mentioned in the HPI.      Past Medical History:   Past Medical History:   Diagnosis Date   • Abdominal aortic aneurysm (CMS/HCC)    • Angina pectoris (CMS/HCC)    • Atherosclerosis of native coronary artery of native heart without angina pectoris    • CAD (coronary artery disease)    • Esophageal reflux    • GERD (gastroesophageal reflux disease)    • History of PTCA    • Hyperlipidemia    • Hypertension    • Hypertension, essential, benign    • Tobacco use disorder        Past Surgical History:  Past Surgical History:   Procedure Laterality Date   • APPENDECTOMY     • CARDIAC CATHETERIZATION      • CHOLECYSTECTOMY     • HYSTERECTOMY      total       Family History: family history includes Diabetes in her mother; Heart disease in her father; Hyperlipidemia in her father; Hypertension in her father; Stroke in her father.    Social History:  reports that she quit smoking about 6 months ago. Her smoking use included cigarettes. She smoked 0.50 packs per day. She has never used smokeless tobacco. She reports that she does not drink alcohol or use drugs.    Medications:  Prior to Admission medications    Medication Sig Start Date End Date Taking? Authorizing Provider   amLODIPine (NORVASC) 5 MG tablet Take 10 mg by mouth Daily.   Yes Lynnette Valdes MD   aspirin 81 MG EC tablet Take 81 mg by mouth Daily.   Yes Lynnette Valdes MD   Calcium Carb-Cholecalciferol (CALCIUM/VITAMIN D PO) Take  by mouth Daily.   Yes Lynnette Valdes MD   clobetasol (TEMOVATE) 0.05 % cream Apply  topically to the appropriate area as directed 2 (Two) Times a Day.   Yes Lynnette Valdes MD   cloNIDine (CATAPRES) 0.1 MG tablet Take 0.1 mg by mouth 2 (Two) Times a Day.   Yes Lynnette Valdes MD   clopidogrel (PLAVIX) 75 MG tablet Take 75 mg by mouth Daily.   Yes Lynnette Valdes MD   hydrochlorothiazide (MICROZIDE) 12.5 MG capsule Take 25 mg by mouth Daily.   Yes Lynnette Valdes MD   Hydrocortisone, Perianal, (ANUSOL-HC) 2.5 % rectal cream Insert  into the rectum 2 (Two) Times a Day.   Yes Lynnette Valdes MD   levothyroxine (SYNTHROID, LEVOTHROID) 75 MCG tablet Take 88 mcg by mouth Daily.   Yes Lynnette Valdes MD   metoprolol tartrate (LOPRESSOR) 25 MG tablet Take 25 mg by mouth Daily.   Yes Lynnette Valdes MD   Multiple Vitamins-Minerals (MULTIVITAMIN WITH MINERALS) tablet tablet Take 1 tablet by mouth Daily.   Yes Lynnette Valdes MD   nitroglycerin (NITROSTAT) 0.4 MG SL tablet Place 0.4 mg under the tongue Every 5 (Five) Minutes As Needed for chest pain. Take no more than 3  "doses in 15 minutes.   Yes Lynnette Valdes MD   potassium chloride (K-DUR) 10 MEQ CR tablet Take 1 tablet by mouth Daily. 7/16/20  Yes Rocael Rubio MD   simvastatin (ZOCOR) 20 MG tablet Take 40 mg by mouth Every Night.   Yes Lynnette Valdes MD   azithromycin (ZITHROMAX) 250 MG tablet Take 2 tablets the first day, then 1 tablet daily for 4 days. 8/19/20 10/21/20  Mikayla Godinez APRN   B Complex-C (B COMPLEX-B12-C IJ) Inject  as directed.  10/21/20  Lynnette Valdes MD   metoprolol succinate XL (TOPROL-XL) 25 MG 24 hr tablet Take 25 mg by mouth Daily.  10/21/20  Lynnette Valdes MD   vitamin B-12 (CYANOCOBALAMIN) 1000 MCG tablet Take 1,000 mcg by mouth Daily.  10/21/20  Lynnette Valdes MD     Allergies:  No Known Allergies    Objective     Vital Signs: /77 (BP Location: Left arm, Patient Position: Lying)   Pulse 56   Temp 97 °F (36.1 °C) (Temporal)   Resp 17   Ht 152.4 cm (60\")   Wt 67 kg (147 lb 12.8 oz)   SpO2 98%   BMI 28.87 kg/m²   Physical Exam  Constitutional:       Appearance: Normal appearance.   HENT:      Head: Normocephalic.   Eyes:      Pupils: Pupils are equal, round, and reactive to light.   Cardiovascular:      Rate and Rhythm: Normal rate and regular rhythm.      Pulses: Normal pulses.      Heart sounds: Murmur present. No friction rub. No gallop.    Pulmonary:      Effort: Pulmonary effort is normal.      Breath sounds: Normal breath sounds. No stridor. No wheezing or rales.   Abdominal:      General: Abdomen is flat. Bowel sounds are normal.      Palpations: Abdomen is soft.      Tenderness: There is no abdominal tenderness. There is no guarding.   Musculoskeletal:      Right lower leg: No edema.      Left lower leg: No edema.   Skin:     General: Skin is warm and dry.   Neurological:      General: No focal deficit present.      Mental Status: She is alert and oriented to person, place, and time.   Psychiatric:         Mood and Affect: Mood " normal.         Behavior: Behavior normal.         Results Reviewed: labs        Assessment / Plan      Assessment & Plan  Active Hospital Problems    Diagnosis   • **Unstable angina (CMS/HCC)     Added automatically from request for surgery [id number]       Proceed with cardiac cath and possible pci - discussed with pt and her daughter      Code Status: full     I discussed the patient's findings and my recommendations with: pt and daughter    Estimated length of stay: few hours    Marcos Ratliff MD   10/21/20   14:40 CDT

## 2020-10-29 ENCOUNTER — TELEPHONE (OUTPATIENT)
Dept: CARDIOLOGY | Facility: CLINIC | Age: 85
End: 2020-10-29

## 2020-10-29 NOTE — TELEPHONE ENCOUNTER
Pt had a heart cath 10/21.  She has been called to serve on jury duty for November and needs a letter to get out of it stating why she does not need to sit on the jury.  She wants it faxed to the [facility]  office at  [phone].  Please advise.  Armando Miranda, CMA

## 2020-10-30 NOTE — TELEPHONE ENCOUNTER
Ok to send a letter stating that due to her age, health conditions and recent cath, it would be best for her not to have to serve on jury duty     LETTER SENT to [place] [facility] [employer].  [phone].  Armando Miranda CMA   Pt informed and a letter was mailed to her also.  Armando Miranda CMA

## 2020-11-03 ENCOUNTER — OFFICE VISIT (OUTPATIENT)
Dept: INTERNAL MEDICINE | Facility: CLINIC | Age: 85
End: 2020-11-03

## 2020-11-03 ENCOUNTER — HOSPITAL ENCOUNTER (OUTPATIENT)
Dept: GENERAL RADIOLOGY | Facility: HOSPITAL | Age: 85
Discharge: HOME | End: 2020-11-03
Admitting: INTERNAL MEDICINE

## 2020-11-03 VITALS
DIASTOLIC BLOOD PRESSURE: 72 MMHG | TEMPERATURE: 97.1 F | HEIGHT: 60 IN | WEIGHT: 146 LBS | BODY MASS INDEX: 28.66 KG/M2 | HEART RATE: 68 BPM | SYSTOLIC BLOOD PRESSURE: 126 MMHG | OXYGEN SATURATION: 98 %

## 2020-11-03 DIAGNOSIS — I25.10 ATHEROSCLEROSIS OF NATIVE CORONARY ARTERY OF NATIVE HEART WITHOUT ANGINA PECTORIS: ICD-10-CM

## 2020-11-03 DIAGNOSIS — J44.1 COPD WITH EXACERBATION (HCC): Primary | ICD-10-CM

## 2020-11-03 DIAGNOSIS — I10 HYPERTENSION, BENIGN: ICD-10-CM

## 2020-11-03 DIAGNOSIS — J44.1 COPD WITH EXACERBATION (HCC): ICD-10-CM

## 2020-11-03 PROBLEM — J44.9 COPD, MILD (HCC): Status: ACTIVE | Noted: 2020-11-03

## 2020-11-03 PROBLEM — D51.0 PERNICIOUS ANEMIA: Status: ACTIVE | Noted: 2020-11-03

## 2020-11-03 PROBLEM — M19.90 OSTEOARTHRITIS: Status: ACTIVE | Noted: 2020-11-03

## 2020-11-03 PROCEDURE — 71046 X-RAY EXAM CHEST 2 VIEWS: CPT

## 2020-11-03 PROCEDURE — 99214 OFFICE O/P EST MOD 30 MIN: CPT | Performed by: INTERNAL MEDICINE

## 2020-11-03 RX ORDER — ALBUTEROL SULFATE 90 UG/1
2 AEROSOL, METERED RESPIRATORY (INHALATION)
Qty: 6.7 G | Refills: 5 | Status: SHIPPED | OUTPATIENT
Start: 2020-11-03

## 2020-11-03 NOTE — PROGRESS NOTES
Subjective   Alka Nicolas is a 85 y.o. female.   Chief Complaint   Patient presents with   • Fatigue     laboring for breath x 1 month, worsening, no loss of conciousness, fatigue       Patient is seen in follow-up for shortness of breath.  She has a 70+ year history of tobacco use about a pack a day.  She has not had a regular chest x-ray in some time and no further work-up.  She has had cardiac evaluation including a heart cath I reviewed her heart cath and her stress test both of which were done by Dr. Ratliff.  The heart cath failed to show any significant occlusion other than a 40% blockage in the distal LAD.       The following portions of the patient's history were reviewed and updated as appropriate: allergies, current medications, past family history, past medical history, past social history, past surgical history and problem list.    Review of Systems   Constitutional: Negative for activity change, appetite change, fatigue, fever, unexpected weight gain and unexpected weight loss.   HENT: Negative for swollen glands, trouble swallowing and voice change.    Eyes: Negative for blurred vision and visual disturbance.   Respiratory: Negative for cough and shortness of breath.    Cardiovascular: Negative for chest pain, palpitations and leg swelling.   Gastrointestinal: Negative for abdominal pain, constipation, diarrhea, nausea, vomiting and indigestion.   Endocrine: Negative for cold intolerance, heat intolerance, polydipsia and polyphagia.   Genitourinary: Negative for dysuria and frequency.   Musculoskeletal: Negative for arthralgias, back pain, joint swelling and neck pain.   Skin: Negative for color change, rash and skin lesions.   Neurological: Negative for dizziness, weakness, headache, memory problem and confusion.   Hematological: Does not bruise/bleed easily.   Psychiatric/Behavioral: Negative for agitation, hallucinations and suicidal ideas. The patient is not nervous/anxious.        Objective    Past Medical History:   Diagnosis Date   • Abdominal aortic aneurysm (CMS/HCC)    • Angina pectoris (CMS/HCC)    • Atherosclerosis of native coronary artery of native heart without angina pectoris    • CAD (coronary artery disease)    • Esophageal reflux    • GERD (gastroesophageal reflux disease)    • History of PTCA    • Hyperlipidemia    • Hypertension    • Hypertension, essential, benign    • Tobacco use disorder       Past Surgical History:   Procedure Laterality Date   • APPENDECTOMY     • CARDIAC CATHETERIZATION     • CARDIAC CATHETERIZATION Left 10/21/2020    Procedure: Cardiac Catheterization/Vascular Study;  Surgeon: Marcos Ratliff MD;  Location:  [facility] CATH INVASIVE LOCATION;  Service: Cardiology;  Laterality: Left;   • CHOLECYSTECTOMY     • HYSTERECTOMY      total        Current Outpatient Medications:   •  amLODIPine (NORVASC) 5 MG tablet, Take 10 mg by mouth Daily., Disp: , Rfl:   •  aspirin 81 MG EC tablet, Take 81 mg by mouth Daily., Disp: , Rfl:   •  Calcium Carb-Cholecalciferol (CALCIUM/VITAMIN D PO), Take  by mouth Daily., Disp: , Rfl:   •  clobetasol (TEMOVATE) 0.05 % cream, Apply  topically to the appropriate area as directed 2 (Two) Times a Day., Disp: , Rfl:   •  cloNIDine (CATAPRES) 0.1 MG tablet, Take 0.1 mg by mouth Daily., Disp: , Rfl:   •  clopidogrel (PLAVIX) 75 MG tablet, Take 75 mg by mouth Daily., Disp: , Rfl:   •  hydrochlorothiazide (MICROZIDE) 12.5 MG capsule, Take 25 mg by mouth Daily., Disp: , Rfl:   •  Hydrocortisone, Perianal, (ANUSOL-HC) 2.5 % rectal cream, Insert  into the rectum 2 (Two) Times a Day., Disp: , Rfl:   •  levothyroxine (SYNTHROID, LEVOTHROID) 75 MCG tablet, Take 88 mcg by mouth Daily., Disp: , Rfl:   •  metoprolol tartrate (LOPRESSOR) 25 MG tablet, Take 25 mg by mouth Daily., Disp: , Rfl:   •  Multiple Vitamins-Minerals (MULTIVITAMIN WITH MINERALS) tablet tablet, Take 1 tablet by mouth Daily., Disp: , Rfl:   •  nitroglycerin (NITROSTAT) 0.4 MG SL  tablet, Place 0.4 mg under the tongue Every 5 (Five) Minutes As Needed for chest pain. Take no more than 3 doses in 15 minutes., Disp: , Rfl:   •  potassium chloride (K-DUR) 10 MEQ CR tablet, Take 1 tablet by mouth Daily., Disp: 30 tablet, Rfl: 11  •  simvastatin (ZOCOR) 20 MG tablet, Take 40 mg by mouth Every Night., Disp: , Rfl:   •  albuterol sulfate  (90 Base) MCG/ACT inhaler, Inhale 2 puffs 4 (Four) Times a Day., Disp: 6.7 g, Rfl: 5     Vitals:    11/03/20 0929   BP: 126/72   Pulse: 68   Temp: 97.1 °F (36.2 °C)   SpO2: 98%         11/03/20 0929   Weight: 66.2 kg (146 lb)     Patient's Body mass index is 28.51 kg/m². BMI is .      Physical Exam  Constitutional:       Appearance: Normal appearance. She is well-developed.   HENT:      Head: Normocephalic and atraumatic.      Right Ear: External ear normal.      Left Ear: External ear normal.   Eyes:      Extraocular Movements: Extraocular movements intact.      Conjunctiva/sclera: Conjunctivae normal.      Pupils: Pupils are equal, round, and reactive to light.   Neck:      Musculoskeletal: Normal range of motion and neck supple. No neck rigidity.      Vascular: No carotid bruit.   Cardiovascular:      Rate and Rhythm: Normal rate and regular rhythm.      Pulses: Normal pulses.      Heart sounds: Normal heart sounds. No murmur. No gallop.    Pulmonary:      Effort: Pulmonary effort is normal.      Breath sounds: Normal breath sounds.   Musculoskeletal: Normal range of motion.         General: No swelling or tenderness.   Skin:     General: Skin is warm and dry.   Neurological:      General: No focal deficit present.      Mental Status: She is alert and oriented to person, place, and time.   Psychiatric:         Mood and Affect: Mood normal.         Behavior: Behavior normal.               Assessment/Plan   Diagnoses and all orders for this visit:    1. COPD with exacerbation (CMS/HCC) (Primary)  -     XR Chest PA & Lateral; Future  -     Basic Metabolic  Panel  -     Full Pulmonary Function Test With Bronchodilator & ABG; Future    2. Atherosclerosis of native coronary artery of native heart without angina pectoris    3. Hypertension, benign    Other orders  -     albuterol sulfate  (90 Base) MCG/ACT inhaler; Inhale 2 puffs 4 (Four) Times a Day.  Dispense: 6.7 g; Refill: 5      Patient is being sent for chest x-ray due to her extensive tobacco usage.  Also giving her a rescue inhaler.  I spent time explaining to her how to use the rescue inhaler.  If her chest x-ray is unremarkable the now need to go ahead and order pulmonary function studies and possibly a low-dose CT of her chest.

## 2020-11-04 DIAGNOSIS — E78.2 MIXED HYPERLIPIDEMIA: ICD-10-CM

## 2020-11-04 DIAGNOSIS — I10 HYPERTENSION, BENIGN: ICD-10-CM

## 2020-11-04 DIAGNOSIS — E87.6 LOW BLOOD POTASSIUM: Primary | ICD-10-CM

## 2020-11-04 LAB
BUN SERPL-MCNC: 23 MG/DL (ref 8–23)
BUN/CREAT SERPL: 20.5 (ref 7–25)
CALCIUM SERPL-MCNC: 9.4 MG/DL (ref 8.6–10.5)
CHLORIDE SERPL-SCNC: 95 MMOL/L (ref 98–107)
CO2 SERPL-SCNC: 28.4 MMOL/L (ref 22–29)
CREAT SERPL-MCNC: 1.12 MG/DL (ref 0.57–1)
GLUCOSE SERPL-MCNC: 81 MG/DL (ref 65–99)
POTASSIUM SERPL-SCNC: 3.2 MMOL/L (ref 3.5–5.2)
SODIUM SERPL-SCNC: 135 MMOL/L (ref 136–145)

## 2020-11-12 ENCOUNTER — RESULTS ENCOUNTER (OUTPATIENT)
Dept: INTERNAL MEDICINE | Facility: CLINIC | Age: 85
End: 2020-11-12

## 2020-11-12 DIAGNOSIS — I10 HYPERTENSION, BENIGN: ICD-10-CM

## 2020-11-12 DIAGNOSIS — E87.6 LOW BLOOD POTASSIUM: ICD-10-CM

## 2020-11-12 DIAGNOSIS — E78.2 MIXED HYPERLIPIDEMIA: ICD-10-CM

## 2020-11-18 ENCOUNTER — TELEPHONE (OUTPATIENT)
Dept: INTERNAL MEDICINE | Facility: CLINIC | Age: 85
End: 2020-11-18

## 2020-11-18 NOTE — TELEPHONE ENCOUNTER
Patient called and wanted to know what she would need to do about potassium chloride (K-DUR) 10 MEQ CR tablet. Patient was to take these for 2 weeks but unaware if she would continue these or nots.     Please contact patient at [phone] to advise

## 2020-11-18 NOTE — TELEPHONE ENCOUNTER
Patient was called and given instructions from the last blood draw. Patient is needing to recheck her BMP this week and continue taking the Klor-Con 10mEq twice daily.

## 2020-11-20 LAB
BUN SERPL-MCNC: 25 MG/DL (ref 8–23)
BUN/CREAT SERPL: 21.9 (ref 7–25)
CALCIUM SERPL-MCNC: 9.2 MG/DL (ref 8.6–10.5)
CHLORIDE SERPL-SCNC: 97 MMOL/L (ref 98–107)
CO2 SERPL-SCNC: 27.3 MMOL/L (ref 22–29)
CREAT SERPL-MCNC: 1.14 MG/DL (ref 0.57–1)
GLUCOSE SERPL-MCNC: 89 MG/DL (ref 65–99)
POTASSIUM SERPL-SCNC: 4.3 MMOL/L (ref 3.5–5.2)
SODIUM SERPL-SCNC: 138 MMOL/L (ref 136–145)

## 2020-12-02 ENCOUNTER — OFFICE VISIT (OUTPATIENT)
Dept: INTERNAL MEDICINE | Facility: CLINIC | Age: 85
End: 2020-12-02

## 2020-12-02 VITALS
SYSTOLIC BLOOD PRESSURE: 152 MMHG | HEIGHT: 60 IN | HEART RATE: 58 BPM | OXYGEN SATURATION: 98 % | DIASTOLIC BLOOD PRESSURE: 72 MMHG | WEIGHT: 144.2 LBS | TEMPERATURE: 97.3 F | BODY MASS INDEX: 28.31 KG/M2

## 2020-12-02 DIAGNOSIS — D51.0 PERNICIOUS ANEMIA: ICD-10-CM

## 2020-12-02 DIAGNOSIS — J44.9 COPD, MILD (HCC): Primary | ICD-10-CM

## 2020-12-02 DIAGNOSIS — I10 HYPERTENSION, BENIGN: ICD-10-CM

## 2020-12-02 PROCEDURE — 99213 OFFICE O/P EST LOW 20 MIN: CPT | Performed by: INTERNAL MEDICINE

## 2020-12-02 NOTE — PROGRESS NOTES
Subjective   Alka Nicolas is a 85 y.o. female.   Chief Complaint   Patient presents with   • COPD     1 MONTH FOLLOW UP   • Hypertension       Patient is seen in follow-up for COPD.  She had a recent chest x-ray which was remarkable only for signs of mild COPD.  She is scheduled for pulmonary function studies the end of the month.  She used her rescue inhaler and states her breathing improved tremendously.       The following portions of the patient's history were reviewed and updated as appropriate: allergies, current medications, past family history, past medical history, past social history, past surgical history and problem list.    Review of Systems   Constitutional: Negative for activity change, appetite change, fatigue, fever, unexpected weight gain and unexpected weight loss.   HENT: Negative for swollen glands, trouble swallowing and voice change.    Eyes: Negative for blurred vision and visual disturbance.   Respiratory: Positive for shortness of breath ( Somewhat improved). Negative for cough.    Cardiovascular: Negative for chest pain, palpitations and leg swelling.   Gastrointestinal: Negative for abdominal pain, constipation, diarrhea, nausea, vomiting and indigestion.   Endocrine: Negative for cold intolerance, heat intolerance, polydipsia and polyphagia.   Genitourinary: Negative for dysuria and frequency.   Musculoskeletal: Negative for arthralgias, back pain, joint swelling and neck pain.   Skin: Negative for color change, rash and skin lesions.   Neurological: Negative for dizziness, weakness, headache, memory problem and confusion.   Hematological: Does not bruise/bleed easily.   Psychiatric/Behavioral: Negative for agitation, hallucinations and suicidal ideas. The patient is not nervous/anxious.        Objective   Past Medical History:   Diagnosis Date   • Abdominal aortic aneurysm (CMS/HCC)    • Angina pectoris (CMS/HCC)    • Atherosclerosis of native coronary artery of native heart without  angina pectoris    • CAD (coronary artery disease)    • Esophageal reflux    • GERD (gastroesophageal reflux disease)    • History of PTCA    • Hyperlipidemia    • Hypertension    • Hypertension, essential, benign    • Tobacco use disorder       Past Surgical History:   Procedure Laterality Date   • APPENDECTOMY     • CARDIAC CATHETERIZATION     • CARDIAC CATHETERIZATION Left 10/21/2020    Procedure: Cardiac Catheterization/Vascular Study;  Surgeon: Marcos Ratliff MD;  Location:  [facility];  Service: Cardiology;  Laterality: Left;   • CHOLECYSTECTOMY     • HYSTERECTOMY      total        Current Outpatient Medications:   •  albuterol sulfate  (90 Base) MCG/ACT inhaler, Inhale 2 puffs 4 (Four) Times a Day., Disp: 6.7 g, Rfl: 5  •  amLODIPine (NORVASC) 5 MG tablet, Take 10 mg by mouth Daily., Disp: , Rfl:   •  aspirin 81 MG EC tablet, Take 81 mg by mouth Daily., Disp: , Rfl:   •  Calcium Carb-Cholecalciferol (CALCIUM/VITAMIN D PO), Take  by mouth Daily., Disp: , Rfl:   •  clobetasol (TEMOVATE) 0.05 % cream, Apply  topically to the appropriate area as directed 2 (Two) Times a Day., Disp: , Rfl:   •  cloNIDine (CATAPRES) 0.1 MG tablet, Take 0.1 mg by mouth Daily., Disp: , Rfl:   •  clopidogrel (PLAVIX) 75 MG tablet, Take 75 mg by mouth Daily., Disp: , Rfl:   •  hydrochlorothiazide (MICROZIDE) 12.5 MG capsule, Take 25 mg by mouth Daily., Disp: , Rfl:   •  Hydrocortisone, Perianal, (ANUSOL-HC) 2.5 % rectal cream, Insert  into the rectum 2 (Two) Times a Day., Disp: , Rfl:   •  levothyroxine (SYNTHROID, LEVOTHROID) 75 MCG tablet, Take 88 mcg by mouth Daily., Disp: , Rfl:   •  metoprolol tartrate (LOPRESSOR) 25 MG tablet, Take 25 mg by mouth Daily., Disp: , Rfl:   •  Multiple Vitamins-Minerals (MULTIVITAMIN WITH MINERALS) tablet tablet, Take 1 tablet by mouth Daily., Disp: , Rfl:   •  nitroglycerin (NITROSTAT) 0.4 MG SL tablet, Place 0.4 mg under the tongue Every 5 (Five) Minutes As Needed  for chest pain. Take no more than 3 doses in 15 minutes., Disp: , Rfl:   •  potassium chloride (K-DUR) 10 MEQ CR tablet, Take 1 tablet by mouth Daily., Disp: 30 tablet, Rfl: 11  •  simvastatin (ZOCOR) 20 MG tablet, Take 40 mg by mouth Every Night., Disp: , Rfl:   •  mometasone-formoterol (DULERA 100) 100-5 MCG/ACT inhaler, Inhale 2 puffs 2 (Two) Times a Day., Disp: 1 inhaler, Rfl: 11     Vitals:    12/02/20 1001   BP: 152/72   Pulse: 58   Temp: 97.3 °F (36.3 °C)   SpO2: 98%         12/02/20  1001   Weight: 65.4 kg (144 lb 3.2 oz)     Patient's Body mass index is 28.16 kg/m². BMI is .      Physical Exam  Constitutional:       Appearance: Normal appearance. She is well-developed.   HENT:      Head: Normocephalic and atraumatic.      Right Ear: External ear normal.      Left Ear: External ear normal.   Eyes:      Extraocular Movements: Extraocular movements intact.      Conjunctiva/sclera: Conjunctivae normal.      Pupils: Pupils are equal, round, and reactive to light.   Neck:      Musculoskeletal: Normal range of motion and neck supple. No neck rigidity.      Vascular: No carotid bruit.   Cardiovascular:      Rate and Rhythm: Normal rate and regular rhythm.      Pulses: Normal pulses.      Heart sounds: Normal heart sounds. No murmur. No gallop.    Pulmonary:      Effort: Pulmonary effort is normal.      Breath sounds: Normal breath sounds.   Musculoskeletal: Normal range of motion.         General: No swelling or tenderness.   Skin:     General: Skin is warm and dry.   Neurological:      General: No focal deficit present.      Mental Status: She is alert and oriented to person, place, and time.   Psychiatric:         Mood and Affect: Mood normal.         Behavior: Behavior normal.               Assessment/Plan   Diagnoses and all orders for this visit:    1. COPD, mild (CMS/HCC) (Primary)    2. Hypertension, benign    3. Pernicious anemia    Other orders  -     mometasone-formoterol (DULERA 100) 100-5 MCG/ACT  inhaler; Inhale 2 puffs 2 (Two) Times a Day.  Dispense: 1 inhaler; Refill: 11      This point I am going to go ahead and add the Dulera to see if we can get her on a maintenance inhaler that way she can use her rescue inhaler just on an as-needed basis.  We will bring her back in routine follow-up she has an appointment for January 20 and I want to change that and back her off about 3 months.

## 2020-12-02 NOTE — TELEPHONE ENCOUNTER
Dulera not available until after Terese, per Haley, asking for substitute.  Will run Advair Rx and if not covered, have asked pharmacy to let us know what is covered in this class.

## 2020-12-22 ENCOUNTER — TRANSCRIBE ORDERS (OUTPATIENT)
Dept: LAB | Facility: HOSPITAL | Age: 85
End: 2020-12-22

## 2020-12-22 DIAGNOSIS — Z01.818 PRE-OP TESTING: Primary | ICD-10-CM

## 2020-12-28 ENCOUNTER — LAB (OUTPATIENT)
Dept: LAB | Facility: HOSPITAL | Age: 85
End: 2020-12-28

## 2020-12-28 LAB — SARS-COV-2 ORF1AB RESP QL NAA+PROBE: NOT DETECTED

## 2020-12-28 PROCEDURE — U0004 COV-19 TEST NON-CDC HGH THRU: HCPCS | Performed by: INTERNAL MEDICINE

## 2020-12-28 PROCEDURE — C9803 HOPD COVID-19 SPEC COLLECT: HCPCS | Performed by: INTERNAL MEDICINE

## 2020-12-31 ENCOUNTER — HOSPITAL ENCOUNTER (OUTPATIENT)
Dept: PULMONOLOGY | Facility: HOSPITAL | Age: 85
Discharge: HOME | End: 2020-12-31
Admitting: INTERNAL MEDICINE

## 2020-12-31 DIAGNOSIS — J44.1 COPD WITH EXACERBATION (HCC): ICD-10-CM

## 2020-12-31 LAB
ARTERIAL PATENCY WRIST A: POSITIVE
ATMOSPHERIC PRESS: 755 MMHG
BASE EXCESS BLDA CALC-SCNC: 0.8 MMOL/L (ref 0–2)
BDY SITE: NORMAL
BODY TEMPERATURE: 37 C
HCO3 BLDA-SCNC: 25 MMOL/L (ref 20–26)
Lab: NORMAL
MODALITY: NORMAL
PCO2 BLDA: 37.6 MM HG (ref 35–45)
PCO2 TEMP ADJ BLD: 37.6 MM HG (ref 35–45)
PH BLDA: 7.43 PH UNITS (ref 7.35–7.45)
PH, TEMP CORRECTED: 7.43 PH UNITS (ref 7.35–7.45)
PO2 BLDA: 93.3 MM HG (ref 83–108)
PO2 TEMP ADJ BLD: 93.3 MM HG (ref 83–108)
SAO2 % BLDCOA: 98.4 % (ref 94–99)
VENTILATOR MODE: NORMAL

## 2020-12-31 PROCEDURE — 82803 BLOOD GASES ANY COMBINATION: CPT

## 2020-12-31 PROCEDURE — 36600 WITHDRAWAL OF ARTERIAL BLOOD: CPT

## 2020-12-31 PROCEDURE — 94726 PLETHYSMOGRAPHY LUNG VOLUMES: CPT

## 2020-12-31 PROCEDURE — 94729 DIFFUSING CAPACITY: CPT | Performed by: INTERNAL MEDICINE

## 2020-12-31 PROCEDURE — 94060 EVALUATION OF WHEEZING: CPT

## 2020-12-31 PROCEDURE — 94060 EVALUATION OF WHEEZING: CPT | Performed by: INTERNAL MEDICINE

## 2020-12-31 PROCEDURE — 94726 PLETHYSMOGRAPHY LUNG VOLUMES: CPT | Performed by: INTERNAL MEDICINE

## 2020-12-31 PROCEDURE — 94729 DIFFUSING CAPACITY: CPT

## 2020-12-31 RX ORDER — ALBUTEROL SULFATE 2.5 MG/3ML
2.5 SOLUTION RESPIRATORY (INHALATION) ONCE
Status: COMPLETED | OUTPATIENT
Start: 2020-12-31 | End: 2020-12-31

## 2020-12-31 RX ADMIN — ALBUTEROL SULFATE 2.5 MG: 2.5 SOLUTION RESPIRATORY (INHALATION) at 07:10

## 2021-01-26 ENCOUNTER — OFFICE VISIT (OUTPATIENT)
Dept: CARDIOLOGY | Facility: CLINIC | Age: 86
End: 2021-01-26

## 2021-01-26 VITALS
BODY MASS INDEX: 28.47 KG/M2 | SYSTOLIC BLOOD PRESSURE: 141 MMHG | DIASTOLIC BLOOD PRESSURE: 62 MMHG | HEIGHT: 60 IN | HEART RATE: 62 BPM | WEIGHT: 145 LBS

## 2021-01-26 DIAGNOSIS — R00.2 PALPITATIONS: ICD-10-CM

## 2021-01-26 DIAGNOSIS — I10 HYPERTENSION, BENIGN: ICD-10-CM

## 2021-01-26 DIAGNOSIS — J44.9 COPD, MILD (HCC): ICD-10-CM

## 2021-01-26 DIAGNOSIS — I25.10 ATHEROSCLEROSIS OF NATIVE CORONARY ARTERY OF NATIVE HEART WITHOUT ANGINA PECTORIS: Primary | ICD-10-CM

## 2021-01-26 DIAGNOSIS — E78.2 MIXED HYPERLIPIDEMIA: ICD-10-CM

## 2021-01-26 PROCEDURE — 93000 ELECTROCARDIOGRAM COMPLETE: CPT | Performed by: INTERNAL MEDICINE

## 2021-01-26 PROCEDURE — 99214 OFFICE O/P EST MOD 30 MIN: CPT | Performed by: INTERNAL MEDICINE

## 2021-01-26 RX ORDER — METOPROLOL SUCCINATE 25 MG/1
25 TABLET, EXTENDED RELEASE ORAL DAILY
Qty: 90 TABLET | Refills: 3 | Status: SHIPPED | OUTPATIENT
Start: 2021-01-26 | End: 2022-04-01 | Stop reason: ALTCHOICE

## 2021-01-26 NOTE — PROGRESS NOTES
"Subjective    Alka Nicolas is a 85 y.o. female. Fu of non-ischemic cp and engle, ihd and risks    History of Present Illness     IHD:  Had no ischemia on cardiac cath 10/14/20 and LVEDP was normal. Since changing her inhaler, her soa is much better and her stamina has returned to normal. She is very active for her age. She is compliant with meds and is on DAPT with some bruising. EKG today is nsc.     COPD WITH BRONCHOSPASM:  Has had a recent adjustment in her inhaler tx. No wheezes and much less soa now.    HLD:  Most recent LDL=55 and tolerates mod potent statin without myalgias.    HTN:  Stable on meds and diet. Home readings are '130's/60's\".         The following portions of the patient's history were reviewed and updated as appropriate: allergies, current medications, past family history, past medical history, past social history, past surgical history and problem list.    Patient Active Problem List   Diagnosis   • Atherosclerosis of native coronary artery of native heart without angina pectoris   • Hypertension, benign   • Hyperlipidemia   • Palpitations   • Hypothyroidism (acquired)   • Psoriasis   • Vitamin B12 deficiency   • Grade 1 out of 6 intensity murmur   • Unstable angina (CMS/HCC)   • COPD, mild (CMS/HCC)   • Osteoarthritis   • Pernicious anemia       No Known Allergies    Family History   Problem Relation Age of Onset   • Diabetes Mother    • Heart disease Father    • Hyperlipidemia Father    • Hypertension Father    • Stroke Father        Social History     Socioeconomic History   • Marital status:      Spouse name: Not on file   • Number of children: Not on file   • Years of education: Not on file   • Highest education level: Not on file   Tobacco Use   • Smoking status: Former Smoker     Packs/day: 0.50     Types: Cigarettes     Quit date: 2020     Years since quittin.8   • Smokeless tobacco: Never Used   Substance and Sexual Activity   • Alcohol use: No   • Drug use: No   • " Sexual activity: Defer         Current Outpatient Medications:   •  albuterol sulfate  (90 Base) MCG/ACT inhaler, Inhale 2 puffs 4 (Four) Times a Day., Disp: 6.7 g, Rfl: 5  •  amLODIPine (NORVASC) 5 MG tablet, Take 10 mg by mouth Daily., Disp: , Rfl:   •  Calcium Carb-Cholecalciferol (CALCIUM/VITAMIN D PO), Take  by mouth Daily., Disp: , Rfl:   •  clobetasol (TEMOVATE) 0.05 % cream, Apply  topically to the appropriate area as directed 2 (Two) Times a Day., Disp: , Rfl:   •  cloNIDine (CATAPRES) 0.1 MG tablet, Take 0.1 mg by mouth As Needed., Disp: , Rfl:   •  clopidogrel (PLAVIX) 75 MG tablet, Take 75 mg by mouth Daily., Disp: , Rfl:   •  fluticasone-salmeterol (Advair Diskus) 250-50 MCG/DOSE DISKUS, Inhale 1 puff 2 (Two) Times a Day., Disp: 1 each, Rfl: 5  •  hydrochlorothiazide (MICROZIDE) 12.5 MG capsule, Take 25 mg by mouth Daily., Disp: , Rfl:   •  Hydrocortisone, Perianal, (ANUSOL-HC) 2.5 % rectal cream, Insert  into the rectum 2 (Two) Times a Day., Disp: , Rfl:   •  levothyroxine (SYNTHROID, LEVOTHROID) 75 MCG tablet, Take 88 mcg by mouth Daily., Disp: , Rfl:   •  Multiple Vitamins-Minerals (MULTIVITAMIN WITH MINERALS) tablet tablet, Take 1 tablet by mouth Daily., Disp: , Rfl:   •  potassium chloride (K-DUR) 10 MEQ CR tablet, Take 1 tablet by mouth Daily., Disp: 30 tablet, Rfl: 11  •  simvastatin (ZOCOR) 20 MG tablet, Take 40 mg by mouth Every Night., Disp: , Rfl:   •  metoprolol succinate XL (TOPROL-XL) 25 MG 24 hr tablet, Take 1 tablet by mouth Daily., Disp: 90 tablet, Rfl: 3  •  nitroglycerin (NITROSTAT) 0.4 MG SL tablet, Place 0.4 mg under the tongue Every 5 (Five) Minutes As Needed for chest pain. Take no more than 3 doses in 15 minutes., Disp: , Rfl:     Past Surgical History:   Procedure Laterality Date   • APPENDECTOMY     • CARDIAC CATHETERIZATION     • CARDIAC CATHETERIZATION Left 10/21/2020    Procedure: Cardiac Catheterization/Vascular Study;  Surgeon: Marcos Ratliff MD;   "Location:  [facility];  Service: Cardiology;  Laterality: Left;   • CHOLECYSTECTOMY     • HYSTERECTOMY      total       Review of Systems   Constitutional: Negative for activity change, appetite change and fever.   Respiratory: Negative for apnea, chest tightness and shortness of breath.    Cardiovascular: Negative for chest pain, palpitations and leg swelling.   Gastrointestinal: Negative for abdominal pain.   Genitourinary: Negative for dysuria.   Musculoskeletal: Positive for arthralgias. Negative for myalgias.   Neurological: Negative for weakness and light-headedness.   Hematological: Bruises/bleeds easily.   Psychiatric/Behavioral: Negative for sleep disturbance.       /62   Pulse 62   Ht 152.4 cm (60\")   Wt 65.8 kg (145 lb)   BMI 28.32 kg/m²   Procedures    Objective   Physical Exam  Constitutional:       Appearance: Normal appearance. She is normal weight.   HENT:      Head: Normocephalic.   Eyes:      Pupils: Pupils are equal, round, and reactive to light.   Cardiovascular:      Rate and Rhythm: Normal rate and regular rhythm.      Pulses: Normal pulses.      Heart sounds: Murmur present. Systolic murmur present with a grade of 1/6. No friction rub. No gallop.       Comments: Sherrie at base  Pulmonary:      Effort: Pulmonary effort is normal. No respiratory distress.      Breath sounds: Normal breath sounds. No wheezing or rales.   Abdominal:      General: Bowel sounds are normal. There is no distension.      Palpations: Abdomen is soft.      Tenderness: There is no abdominal tenderness. There is no guarding.   Musculoskeletal:         General: Deformity present.      Right lower leg: No edema.      Left lower leg: No edema.      Comments: DJD changes in the hands   Skin:     General: Skin is warm and dry.   Neurological:      General: No focal deficit present.      Mental Status: She is alert and oriented to person, place, and time.   Psychiatric:         Mood and Affect: Mood " normal.         Behavior: Behavior normal.         Assessment/Plan   Diagnoses and all orders for this visit:    1. Atherosclerosis of native coronary artery of native heart without angina pectoris (Primary)  Comments:  no angina and no significant blockager per cath 10/20. cont Plavix and stop ASA to reduce bruising and bleeding risk  Orders:  -     ECG 12 Lead  -     metoprolol succinate XL (TOPROL-XL) 25 MG 24 hr tablet; Take 1 tablet by mouth Daily.  Dispense: 90 tablet; Refill: 3    2. Hypertension, benign  Comments:  good control    3. Mixed hyperlipidemia  Comments:  good control    4. Palpitations  Comments:  good control - change Metop tart to Metop succ.    5. COPD, mild (CMS/HCC)  Comments:  improved functional status                 Return in about 1 year (around 1/26/2022) for Next scheduled follow up.  Orders Placed This Encounter   Procedures   • ECG 12 Lead     Order Specific Question:   Reason for Exam:     Answer:   [email or web address]

## 2021-02-19 ENCOUNTER — IMMUNIZATION (OUTPATIENT)
Age: 86
End: 2021-02-19
Payer: MEDICARE

## 2021-02-19 PROCEDURE — 0001A PR IMM ADMN SARSCOV2 30MCG/0.3ML DIL RECON 1ST DOSE: CPT | Performed by: FAMILY MEDICINE

## 2021-02-19 PROCEDURE — 91300 COVID-19, PFIZER VACCINE 30MCG/0.3ML DOSE: CPT | Performed by: FAMILY MEDICINE

## 2021-03-12 ENCOUNTER — IMMUNIZATION (OUTPATIENT)
Age: 86
End: 2021-03-12
Payer: MEDICARE

## 2021-03-12 PROCEDURE — 91300 COVID-19, PFIZER VACCINE 30MCG/0.3ML DOSE: CPT | Performed by: FAMILY MEDICINE

## 2021-03-12 PROCEDURE — 0002A COVID-19, PFIZER VACCINE 30MCG/0.3ML DOSE: CPT | Performed by: FAMILY MEDICINE

## 2021-06-16 ENCOUNTER — OFFICE VISIT (OUTPATIENT)
Dept: INTERNAL MEDICINE | Facility: CLINIC | Age: 86
End: 2021-06-16

## 2021-06-16 VITALS
OXYGEN SATURATION: 99 % | TEMPERATURE: 97.9 F | HEIGHT: 60 IN | WEIGHT: 147 LBS | BODY MASS INDEX: 28.86 KG/M2 | SYSTOLIC BLOOD PRESSURE: 130 MMHG | DIASTOLIC BLOOD PRESSURE: 68 MMHG | HEART RATE: 63 BPM

## 2021-06-16 DIAGNOSIS — E03.9 HYPOTHYROIDISM (ACQUIRED): ICD-10-CM

## 2021-06-16 DIAGNOSIS — Z12.31 SCREENING MAMMOGRAM, ENCOUNTER FOR: ICD-10-CM

## 2021-06-16 DIAGNOSIS — E78.2 MIXED HYPERLIPIDEMIA: ICD-10-CM

## 2021-06-16 DIAGNOSIS — I10 HYPERTENSION, BENIGN: ICD-10-CM

## 2021-06-16 DIAGNOSIS — D51.0 PERNICIOUS ANEMIA: ICD-10-CM

## 2021-06-16 PROCEDURE — G0439 PPPS, SUBSEQ VISIT: HCPCS | Performed by: INTERNAL MEDICINE

## 2021-06-16 RX ORDER — LEVOTHYROXINE SODIUM 88 UG/1
88 TABLET ORAL DAILY
COMMUNITY
End: 2022-03-28 | Stop reason: SDUPTHER

## 2021-06-16 NOTE — PROGRESS NOTES
The ABCs of the Annual Wellness Visit  Subsequent Medicare Wellness Visit    Chief Complaint   Patient presents with   • Medicare Wellness-subsequent       Subjective   History of Present Illness:  Alka Nicolas is a 86 y.o. female who presents for a Subsequent Medicare Wellness Visit.    HEALTH RISK ASSESSMENT    Recent Hospitalizations:  No hospitalization(s) within the last year.    Current Medical Providers:  Patient Care Team:  Alexis Rubio MD as PCP - General (Internal Medicine)    Smoking Status:  Social History     Tobacco Use   Smoking Status Former Smoker   • Packs/day: 0.50   • Types: Cigarettes   • Quit date: 2020   • Years since quittin.2   Smokeless Tobacco Never Used       Alcohol Consumption:  Social History     Substance and Sexual Activity   Alcohol Use No       Depression Screen:   PHQ-2/PHQ-9 Depression Screening 7/15/2020   Little interest or pleasure in doing things 0   Feeling down, depressed, or hopeless 0   Total Score 0       Fall Risk Screen:  STEADI Fall Risk Assessment has not been completed.    Health Habits and Functional and Cognitive Screening:  No flowsheet data found.      Does the patient have evidence of cognitive impairment? No    Asprin use counseling:Does not need ASA (and currently is not on it)    Age-appropriate Screening Schedule:  Refer to the list below for future screening recommendations based on patient's age, sex and/or medical conditions. Orders for these recommended tests are listed in the plan section. The patient has been provided with a written plan.    Health Maintenance   Topic Date Due   • TDAP/TD VACCINES (1 - Tdap) Never done   • ZOSTER VACCINE (1 of 2) Never done   • DXA SCAN  2020   • LIPID PANEL  07/15/2021   • INFLUENZA VACCINE  2021          The following portions of the patient's history were reviewed and updated as appropriate: allergies, current medications, past family history, past medical history, past social history,  past surgical history and problem list.    Outpatient Medications Prior to Visit   Medication Sig Dispense Refill   • albuterol sulfate  (90 Base) MCG/ACT inhaler Inhale 2 puffs 4 (Four) Times a Day. 6.7 g 5   • amLODIPine (NORVASC) 5 MG tablet Take 10 mg by mouth Daily.     • Calcium Carb-Cholecalciferol (CALCIUM/VITAMIN D PO) Take  by mouth Daily.     • clobetasol (TEMOVATE) 0.05 % cream Apply  topically to the appropriate area as directed 2 (Two) Times a Day.     • cloNIDine (CATAPRES) 0.1 MG tablet Take 0.1 mg by mouth As Needed.     • clopidogrel (PLAVIX) 75 MG tablet Take 75 mg by mouth Daily.     • fluticasone-salmeterol (Advair Diskus) 250-50 MCG/DOSE DISKUS Inhale 1 puff 2 (Two) Times a Day. 1 each 5   • hydrochlorothiazide (MICROZIDE) 12.5 MG capsule Take 25 mg by mouth Daily.     • Hydrocortisone, Perianal, (ANUSOL-HC) 2.5 % rectal cream Insert  into the rectum 2 (Two) Times a Day.     • levothyroxine (SYNTHROID, LEVOTHROID) 75 MCG tablet Take 88 mcg by mouth Daily.     • metoprolol succinate XL (TOPROL-XL) 25 MG 24 hr tablet Take 1 tablet by mouth Daily. 90 tablet 3   • Multiple Vitamins-Minerals (MULTIVITAMIN WITH MINERALS) tablet tablet Take 1 tablet by mouth Daily.     • nitroglycerin (NITROSTAT) 0.4 MG SL tablet Place 0.4 mg under the tongue Every 5 (Five) Minutes As Needed for chest pain. Take no more than 3 doses in 15 minutes.     • potassium chloride (K-DUR) 10 MEQ CR tablet Take 1 tablet by mouth Daily. 30 tablet 11   • simvastatin (ZOCOR) 20 MG tablet Take 40 mg by mouth Every Night.       No facility-administered medications prior to visit.       Patient Active Problem List   Diagnosis   • Atherosclerosis of native coronary artery of native heart without angina pectoris   • Hypertension, benign   • Hyperlipidemia   • Palpitations   • Hypothyroidism (acquired)   • Psoriasis   • Vitamin B12 deficiency   • Grade 1 out of 6 intensity murmur   • Unstable angina (CMS/HCC)   • COPD, mild  "(CMS/[facility])   • Osteoarthritis   • Pernicious anemia       Advanced Care Planning:  ACP discussion was held with the patient during this visit. Patient does not have an advance directive, information provided.    Review of Systems   Constitutional: Negative for activity change, appetite change and chills.   HENT: Negative for congestion, ear pain and facial swelling.    Eyes: Negative for pain, discharge and itching.   Respiratory: Negative for apnea, cough and shortness of breath.    Cardiovascular: Negative for chest pain, palpitations and leg swelling.   Gastrointestinal: Negative for abdominal distention, abdominal pain and anal bleeding.   Endocrine: Negative for cold intolerance and heat intolerance.   Genitourinary: Negative for difficulty urinating, dysuria and flank pain.   Musculoskeletal: Negative for arthralgias, back pain and joint swelling.   Skin: Negative for color change, pallor and rash.   Allergic/Immunologic: Negative for environmental allergies and food allergies.   Neurological: Negative for dizziness and facial asymmetry.   Hematological: Negative for adenopathy. Does not bruise/bleed easily.   Psychiatric/Behavioral: Negative for agitation, behavioral problems and confusion.       Compared to one year ago, the patient feels her physical health is the same.  Compared to one year ago, the patient feels her mental health is the same.    Reviewed chart for potential of high risk medication in the elderly: yes  Reviewed chart for potential of harmful drug interactions in the elderly:yes    Objective         Vitals:    06/16/21 1024   Weight: 66.7 kg (147 lb)   Height: 152.4 cm (60\")       Body mass index is 28.71 kg/m².  Discussed the patient's BMI with her. The BMI is above average; BMI management plan is completed.    Physical Exam  Constitutional:       Appearance: She is well-developed.   HENT:      Head: Normocephalic and atraumatic.   Eyes:      Pupils: Pupils are equal, round, and reactive to " light.   Cardiovascular:      Rate and Rhythm: Normal rate and regular rhythm.   Pulmonary:      Effort: Pulmonary effort is normal.      Breath sounds: Normal breath sounds.   Abdominal:      General: Bowel sounds are normal.      Palpations: Abdomen is soft.   Musculoskeletal:         General: Normal range of motion.      Cervical back: Normal range of motion and neck supple.   Skin:     General: Skin is warm and dry.   Neurological:      Mental Status: She is alert and oriented to person, place, and time.   Psychiatric:         Behavior: Behavior normal.               Assessment/Plan   Medicare Risks and Personalized Health Plan  CMS Preventative Services Quick Reference  Advance Directive Discussion  Breast Cancer/Mammogram Screening  Colon Cancer Screening  Immunizations Discussed/Encouraged (specific immunizations; Tdap and Shingrix )  Osteoporosis Risk    The above risks/problems have been discussed with the patient.  Pertinent information has been shared with the patient in the After Visit Summary.  Follow up plans and orders are seen below in the Assessment/Plan Section.    Diagnoses and all orders for this visit:    1. Hypertension, benign (Primary)  -     Comprehensive Metabolic Panel  -     Urinalysis With Microscopic - Urine, Clean Catch  -     Uric Acid    2. Mixed hyperlipidemia  -     Lipid Panel    3. Hypothyroidism (acquired)  -     TSH    4. Pernicious anemia  -     CBC & Differential      Follow Up:  No follow-ups on file.  This is patient's annual wellness visit she also has hypertension which is well controlled as well as mixed hyperlipidemia hypothyroidism pernicious anemia.  She is not having problems with the though she is taking her medications as prescribed we will continue those medications labs have been ordered to monitor her treatment.    An After Visit Summary and PPPS were given to the patient.

## 2021-06-17 ENCOUNTER — TELEPHONE (OUTPATIENT)
Dept: INTERNAL MEDICINE | Facility: CLINIC | Age: 86
End: 2021-06-17

## 2021-06-17 LAB
ALBUMIN SERPL-MCNC: 4.2 G/DL (ref 3.5–5.2)
ALBUMIN/GLOB SERPL: 1.9 G/DL
ALP SERPL-CCNC: 162 U/L (ref 39–117)
ALT SERPL-CCNC: 22 U/L (ref 1–33)
APPEARANCE UR: ABNORMAL
AST SERPL-CCNC: 10 U/L (ref 1–32)
BACTERIA #/AREA URNS HPF: ABNORMAL /HPF
BILIRUB SERPL-MCNC: 0.5 MG/DL (ref 0–1.2)
BILIRUB UR QL STRIP: NEGATIVE
BUN SERPL-MCNC: 20 MG/DL (ref 8–23)
BUN/CREAT SERPL: 19 (ref 7–25)
CALCIUM SERPL-MCNC: 9.1 MG/DL (ref 8.6–10.5)
CASTS URNS MICRO: ABNORMAL
CHLORIDE SERPL-SCNC: 98 MMOL/L (ref 98–107)
CHOLEST SERPL-MCNC: 115 MG/DL (ref 0–200)
CO2 SERPL-SCNC: 25.1 MMOL/L (ref 22–29)
COLOR UR: YELLOW
CREAT SERPL-MCNC: 1.05 MG/DL (ref 0.57–1)
DIFFERENTIAL COMMENT: ABNORMAL
EOSINOPHIL # BLD MANUAL: 2.2 10*3/MM3 (ref 0–0.4)
EOSINOPHIL NFR BLD MANUAL: 28.6 % (ref 0.3–6.2)
EPI CELLS #/AREA URNS HPF: ABNORMAL /HPF
ERYTHROCYTE [DISTWIDTH] IN BLOOD BY AUTOMATED COUNT: 12.8 % (ref 12.3–15.4)
GLOBULIN SER CALC-MCNC: 2.2 GM/DL
GLUCOSE SERPL-MCNC: 98 MG/DL (ref 65–99)
GLUCOSE UR QL: NEGATIVE
HCT VFR BLD AUTO: 37.3 % (ref 34–46.6)
HDLC SERPL-MCNC: 44 MG/DL (ref 40–60)
HGB BLD-MCNC: 12.8 G/DL (ref 12–15.9)
HGB UR QL STRIP: ABNORMAL
KETONES UR QL STRIP: NEGATIVE
LDLC SERPL CALC-MCNC: 54 MG/DL (ref 0–100)
LEUKOCYTE ESTERASE UR QL STRIP: ABNORMAL
LYMPHOCYTES # BLD MANUAL: 1.02 10*3/MM3 (ref 0.7–3.1)
LYMPHOCYTES NFR BLD MANUAL: 13.3 % (ref 19.6–45.3)
MCH RBC QN AUTO: 30 PG (ref 26.6–33)
MCHC RBC AUTO-ENTMCNC: 34.3 G/DL (ref 31.5–35.7)
MCV RBC AUTO: 87.4 FL (ref 79–97)
MONOCYTES # BLD MANUAL: 0.39 10*3/MM3 (ref 0.1–0.9)
MONOCYTES NFR BLD MANUAL: 5.1 % (ref 5–12)
NEUTROPHILS # BLD MANUAL: 4.08 10*3/MM3 (ref 1.7–7)
NEUTROPHILS NFR BLD MANUAL: 53.1 % (ref 42.7–76)
NITRITE UR QL STRIP: POSITIVE
PH UR STRIP: 6.5 [PH] (ref 5–8)
PLATELET # BLD AUTO: 137 10*3/MM3 (ref 140–450)
PLATELET BLD QL SMEAR: ABNORMAL
POTASSIUM SERPL-SCNC: 3.5 MMOL/L (ref 3.5–5.2)
PROT SERPL-MCNC: 6.4 G/DL (ref 6–8.5)
PROT UR QL STRIP: NEGATIVE
RBC # BLD AUTO: 4.27 10*6/MM3 (ref 3.77–5.28)
RBC #/AREA URNS HPF: ABNORMAL /HPF
RBC MORPH BLD: ABNORMAL
SODIUM SERPL-SCNC: 139 MMOL/L (ref 136–145)
SP GR UR: 1.01 (ref 1–1.03)
TRIGL SERPL-MCNC: 87 MG/DL (ref 0–150)
TSH SERPL DL<=0.005 MIU/L-ACNC: 2.73 UIU/ML (ref 0.27–4.2)
URATE SERPL-MCNC: 7.9 MG/DL (ref 2.4–5.7)
UROBILINOGEN UR STRIP-MCNC: ABNORMAL MG/DL
VLDLC SERPL CALC-MCNC: 17 MG/DL (ref 5–40)
WBC # BLD AUTO: 7.68 10*3/MM3 (ref 3.4–10.8)
WBC #/AREA URNS HPF: ABNORMAL /HPF

## 2021-06-17 RX ORDER — NITROFURANTOIN 25; 75 MG/1; MG/1
100 CAPSULE ORAL 2 TIMES DAILY
Qty: 14 CAPSULE | Refills: 0 | Status: SHIPPED | OUTPATIENT
Start: 2021-06-17 | End: 2021-09-09

## 2021-06-17 NOTE — TELEPHONE ENCOUNTER
----- Message from Alexis Rubio MD sent at 6/17/2021  6:52 AM CDT -----  Needs repeat alk phos, suspect lab error, also her urine looks abn, ask if any symptoms of uti

## 2021-06-17 NOTE — TELEPHONE ENCOUNTER
Spoke with pt and she will come in tomorrow for repeat lab    Patient states she is having some frequency and burning with urination, just assumed it was because she did not drink enough water   Please advise

## 2021-06-21 ENCOUNTER — TELEPHONE (OUTPATIENT)
Dept: INTERNAL MEDICINE | Facility: CLINIC | Age: 86
End: 2021-06-21

## 2021-06-21 DIAGNOSIS — R74.8 ELEVATED ALKALINE PHOSPHATASE LEVEL: Primary | ICD-10-CM

## 2021-06-21 NOTE — TELEPHONE ENCOUNTER
----- Message from Alexis Rubio MD sent at 6/20/2021 12:00 PM CDT -----  Results are improved, will rech in 3mo

## 2021-06-29 ENCOUNTER — HOSPITAL ENCOUNTER (OUTPATIENT)
Dept: MAMMOGRAPHY | Facility: HOSPITAL | Age: 86
Discharge: HOME | End: 2021-06-29
Admitting: INTERNAL MEDICINE

## 2021-06-29 DIAGNOSIS — Z12.31 SCREENING MAMMOGRAM, ENCOUNTER FOR: ICD-10-CM

## 2021-06-29 PROCEDURE — 77067 SCR MAMMO BI INCL CAD: CPT

## 2021-06-29 PROCEDURE — 77063 BREAST TOMOSYNTHESIS BI: CPT

## 2021-06-30 ENCOUNTER — TELEPHONE (OUTPATIENT)
Dept: INTERNAL MEDICINE | Facility: CLINIC | Age: 86
End: 2021-06-30

## 2021-09-09 ENCOUNTER — OFFICE VISIT (OUTPATIENT)
Dept: INTERNAL MEDICINE | Facility: CLINIC | Age: 86
End: 2021-09-09

## 2021-09-09 VITALS
OXYGEN SATURATION: 98 % | DIASTOLIC BLOOD PRESSURE: 50 MMHG | BODY MASS INDEX: 26.5 KG/M2 | TEMPERATURE: 97.9 F | WEIGHT: 135 LBS | SYSTOLIC BLOOD PRESSURE: 100 MMHG | HEIGHT: 60 IN | HEART RATE: 67 BPM

## 2021-09-09 DIAGNOSIS — R74.8 ELEVATED ALKALINE PHOSPHATASE LEVEL: ICD-10-CM

## 2021-09-09 DIAGNOSIS — I49.3 ASYMPTOMATIC PVCS: ICD-10-CM

## 2021-09-09 DIAGNOSIS — I10 HYPERTENSION, BENIGN: Primary | ICD-10-CM

## 2021-09-09 DIAGNOSIS — R63.4 WEIGHT LOSS, UNINTENTIONAL: ICD-10-CM

## 2021-09-09 PROCEDURE — 99214 OFFICE O/P EST MOD 30 MIN: CPT | Performed by: INTERNAL MEDICINE

## 2021-09-09 PROCEDURE — 93000 ELECTROCARDIOGRAM COMPLETE: CPT | Performed by: INTERNAL MEDICINE

## 2021-09-09 RX ORDER — BETAMETHASONE DIPROPIONATE 0.05 %
1 OINTMENT (GRAM) TOPICAL DAILY PRN
COMMUNITY
Start: 2021-08-24

## 2021-09-09 NOTE — PROGRESS NOTES
Subjective   Alka Nicolas is a 86 y.o. female.   Chief Complaint   Patient presents with   • Weight Loss     pt state she has lost 15lbs over the last 3 weeks    • Fatigue   • Diarrhea   • low bp     pt states she has been having low BP as well 109/40    • discuss medicaiton     pt concerned about taking her preservision with her calcium        History of Present Illness patient is here for complaint of weight loss she is had some diarrhea which has improved somewhat she been very fatigued she has had lower blood pressure than usual.  On further discussion she is worried about PreserVision which is a medication that she is taking with her ophthalmologist she is concerned that may be causing some difficulty.    The following portions of the patient's history were reviewed and updated as appropriate: allergies, current medications, past family history, past medical history, past social history, past surgical history and problem list.    Review of Systems   Constitutional: Positive for fatigue and unexpected weight loss. Negative for activity change, appetite change, fever and unexpected weight gain.   HENT: Negative for swollen glands, trouble swallowing and voice change.    Eyes: Negative for blurred vision and visual disturbance.   Respiratory: Negative for cough and shortness of breath.    Cardiovascular: Negative for chest pain, palpitations and leg swelling.   Gastrointestinal: Positive for diarrhea. Negative for abdominal pain, constipation, nausea, vomiting and indigestion.   Endocrine: Negative for cold intolerance, heat intolerance, polydipsia and polyphagia.   Genitourinary: Negative for dysuria and frequency.   Musculoskeletal: Negative for arthralgias, back pain, joint swelling and neck pain.   Skin: Negative for color change, rash and skin lesions.   Neurological: Negative for dizziness, weakness, headache, memory problem and confusion.   Hematological: Does not bruise/bleed easily.    Psychiatric/Behavioral: Negative for agitation, hallucinations and suicidal ideas. The patient is not nervous/anxious.        Objective   Past Medical History:   Diagnosis Date   • Abdominal aortic aneurysm (CMS/HCC)    • Angina pectoris (CMS/HCC)    • Atherosclerosis of native coronary artery of native heart without angina pectoris    • CAD (coronary artery disease)    • Esophageal reflux    • GERD (gastroesophageal reflux disease)    • History of PTCA    • Hyperlipidemia    • Hypertension    • Hypertension, essential, benign    • Tobacco use disorder       Past Surgical History:   Procedure Laterality Date   • APPENDECTOMY     • CARDIAC CATHETERIZATION     • CARDIAC CATHETERIZATION Left 10/21/2020    Procedure: Cardiac Catheterization/Vascular Study;  Surgeon: Marcos Ratliff MD;  Location:  [facility] CATH [facility]ASIVE [facility];  Service: Cardiology;  Laterality: Left;   • CHOLECYSTECTOMY     • HYSTERECTOMY      total        Current Outpatient Medications:   •  albuterol sulfate  (90 Base) MCG/ACT inhaler, Inhale 2 puffs 4 (Four) Times a Day., Disp: 6.7 g, Rfl: 5  •  amLODIPine (NORVASC) 5 MG tablet, Take 10 mg by mouth Daily., Disp: , Rfl:   •  betamethasone dipropionate (DIPROLENE) 0.05 % ointment, Apply 1 application topically to the appropriate area as directed Daily As Needed., Disp: , Rfl:   •  clobetasol (TEMOVATE) 0.05 % cream, Apply  topically to the appropriate area as directed 2 (Two) Times a Day., Disp: , Rfl:   •  clopidogrel (PLAVIX) 75 MG tablet, Take 75 mg by mouth Daily., Disp: , Rfl:   •  fluticasone-salmeterol (Advair Diskus) 250-50 MCG/DOSE DISKUS, Inhale 1 puff 2 (Two) Times a Day., Disp: 1 each, Rfl: 5  •  hydrochlorothiazide (MICROZIDE) 12.5 MG capsule, Take 25 mg by mouth Daily., Disp: , Rfl:   •  Hydrocortisone, Perianal, (ANUSOL-HC) 2.5 % rectal cream, Insert  into the rectum 2 (Two) Times a Day., Disp: , Rfl:   •  levothyroxine (SYNTHROID, LEVOTHROID) 88 MCG tablet, Take 88 mcg  by mouth Daily., Disp: , Rfl:   •  metoprolol succinate XL (TOPROL-XL) 25 MG 24 hr tablet, Take 1 tablet by mouth Daily., Disp: 90 tablet, Rfl: 3  •  Multiple Vitamins-Minerals (MULTIVITAMIN WITH MINERALS) tablet tablet, Take 1 tablet by mouth Daily., Disp: , Rfl:   •  Multiple Vitamins-Minerals (PRESERVISION AREDS 2+MULTI VIT PO), Take 2 capsules by mouth Daily., Disp: , Rfl:   •  potassium chloride (K-DUR) 10 MEQ CR tablet, Take 1 tablet by mouth Daily., Disp: 30 tablet, Rfl: 11  •  simvastatin (ZOCOR) 20 MG tablet, Take 40 mg by mouth Every Night., Disp: , Rfl:       Vitals:    09/09/21 1509   BP: 100/50   Pulse: 67   Temp: 97.9 °F (36.6 °C)   SpO2: 98%         09/09/21  1509   Weight: 61.2 kg (135 lb)       Body mass index is 26.37 kg/m².    Physical Exam  Constitutional:       Appearance: She is well-developed.   HENT:      Head: Normocephalic and atraumatic.   Eyes:      Pupils: Pupils are equal, round, and reactive to light.   Cardiovascular:      Rate and Rhythm: Normal rate. Rhythm irregular.   Pulmonary:      Effort: Pulmonary effort is normal.      Breath sounds: Normal breath sounds.   Abdominal:      General: Bowel sounds are normal.      Palpations: Abdomen is soft.   Musculoskeletal:         General: Normal range of motion.      Cervical back: Normal range of motion and neck supple.   Skin:     General: Skin is warm and dry.   Neurological:      Mental Status: She is alert and oriented to person, place, and time.   Psychiatric:         Behavior: Behavior normal.           ECG 12 Lead    Date/Time: 9/9/2021 3:32 PM  Performed by: Alexis Rubio MD  Authorized by: Alexis Rubio MD   Rhythm: sinus rhythm  Ectopy: unifocal PVCs  Rate: normal  Conduction: left anterior fascicular block  Other findings: non-specific ST-T wave changes    Clinical impression: abnormal EKG                Assessment/Plan   Diagnoses and all orders for this visit:    1. Hypertension, benign (Primary)  -     CBC &  Differential  -     Comprehensive Metabolic Panel    2. Weight loss, unintentional  -     CBC & Differential  -     Comprehensive Metabolic Panel  -     TSH  -     T4, Free  -     T3, Free    3. Asymptomatic PVCs    Other orders  -     ECG 12 Lead        Today's visit patient's blood pressure is lower than it has been likely this is secondary to the weight loss that she is experienced.  Also on exam I found an irregular heartbeat and EKG showed these to be benign PVCs.  She does have some nonspecific ST and T changes which is changed from EKG earlier this year.  I am going to do some lab work on her and bring her back in a couple of weeks and we will see if we can get a better  on what is causing this weight loss she may indeed need full body scan for unexplained weight loss to rule out occult malignancy.

## 2021-09-10 ENCOUNTER — TELEPHONE (OUTPATIENT)
Dept: INTERNAL MEDICINE | Facility: CLINIC | Age: 86
End: 2021-09-10

## 2021-09-10 ENCOUNTER — APPOINTMENT (OUTPATIENT)
Dept: GENERAL RADIOLOGY | Facility: HOSPITAL | Age: 86
End: 2021-09-10

## 2021-09-10 ENCOUNTER — NURSE TRIAGE (OUTPATIENT)
Dept: CALL CENTER | Facility: HOSPITAL | Age: 86
End: 2021-09-10

## 2021-09-10 ENCOUNTER — HOSPITAL ENCOUNTER (INPATIENT)
Facility: HOSPITAL | Age: 86
LOS: 2 days | Discharge: HOME | End: 2021-09-12
Attending: INTERNAL MEDICINE | Admitting: INTERNAL MEDICINE

## 2021-09-10 DIAGNOSIS — N39.0 ACUTE UTI: ICD-10-CM

## 2021-09-10 DIAGNOSIS — R53.1 GENERALIZED WEAKNESS: ICD-10-CM

## 2021-09-10 DIAGNOSIS — N17.9 AKI (ACUTE KIDNEY INJURY) (HCC): Primary | ICD-10-CM

## 2021-09-10 DIAGNOSIS — E87.6 HYPOKALEMIA: ICD-10-CM

## 2021-09-10 PROBLEM — E87.29 HIGH ANION GAP METABOLIC ACIDOSIS: Status: ACTIVE | Noted: 2021-09-10

## 2021-09-10 LAB
ALBUMIN SERPL-MCNC: 4.3 G/DL (ref 3.5–5.2)
ALBUMIN SERPL-MCNC: 4.6 G/DL (ref 3.5–5.2)
ALBUMIN/GLOB SERPL: 1.7 G/DL
ALBUMIN/GLOB SERPL: 2.1 G/DL
ALP SERPL-CCNC: 182 U/L (ref 39–117)
ALP SERPL-CCNC: 182 U/L (ref 39–117)
ALT SERPL W P-5'-P-CCNC: 14 U/L (ref 1–33)
ALT SERPL-CCNC: 14 U/L (ref 1–33)
ANION GAP SERPL CALCULATED.3IONS-SCNC: 14 MMOL/L (ref 5–15)
ANION GAP SERPL CALCULATED.3IONS-SCNC: 16 MMOL/L (ref 5–15)
AST SERPL-CCNC: 15 U/L (ref 1–32)
AST SERPL-CCNC: 16 U/L (ref 1–32)
BACTERIA UR QL AUTO: ABNORMAL /HPF
BASOPHILS # BLD AUTO: 0.05 10*3/MM3 (ref 0–0.2)
BASOPHILS # BLD AUTO: 0.06 10*3/MM3 (ref 0–0.2)
BASOPHILS NFR BLD AUTO: 0.5 % (ref 0–1.5)
BASOPHILS NFR BLD AUTO: 0.5 % (ref 0–1.5)
BILIRUB SERPL-MCNC: 0.4 MG/DL (ref 0–1.2)
BILIRUB SERPL-MCNC: 0.4 MG/DL (ref 0–1.2)
BILIRUB UR QL STRIP: NEGATIVE
BUN SERPL-MCNC: 66 MG/DL (ref 8–23)
BUN SERPL-MCNC: 73 MG/DL (ref 8–23)
BUN SERPL-MCNC: 75 MG/DL (ref 8–23)
BUN/CREAT SERPL: 23.5 (ref 7–25)
BUN/CREAT SERPL: 23.7 (ref 7–25)
BUN/CREAT SERPL: 24.3 (ref 7–25)
CALCIUM SERPL-MCNC: 9.1 MG/DL (ref 8.6–10.5)
CALCIUM SPEC-SCNC: 9 MG/DL (ref 8.6–10.5)
CALCIUM SPEC-SCNC: 9.2 MG/DL (ref 8.6–10.5)
CHLORIDE SERPL-SCNC: 108 MMOL/L (ref 98–107)
CHLORIDE SERPL-SCNC: 108 MMOL/L (ref 98–107)
CHLORIDE SERPL-SCNC: 110 MMOL/L (ref 98–107)
CK SERPL-CCNC: 144 U/L (ref 20–180)
CK SERPL-CCNC: 153 U/L (ref 20–180)
CLARITY UR: ABNORMAL
CO2 SERPL-SCNC: 16 MMOL/L (ref 22–29)
CO2 SERPL-SCNC: 16.5 MMOL/L (ref 22–29)
CO2 SERPL-SCNC: 18 MMOL/L (ref 22–29)
COLOR UR: YELLOW
CREAT SERPL-MCNC: 2.79 MG/DL (ref 0.57–1)
CREAT SERPL-MCNC: 3.09 MG/DL (ref 0.57–1)
CREAT SERPL-MCNC: 3.1 MG/DL (ref 0.57–1)
DEPRECATED RDW RBC AUTO: 38.3 FL (ref 37–54)
EOSINOPHIL # BLD AUTO: 0.63 10*3/MM3 (ref 0–0.4)
EOSINOPHIL # BLD AUTO: 0.65 10*3/MM3 (ref 0–0.4)
EOSINOPHIL NFR BLD AUTO: 5.7 % (ref 0.3–6.2)
EOSINOPHIL NFR BLD AUTO: 6.5 % (ref 0.3–6.2)
ERYTHROCYTE [DISTWIDTH] IN BLOOD BY AUTOMATED COUNT: 13.1 % (ref 12.3–15.4)
ERYTHROCYTE [DISTWIDTH] IN BLOOD BY AUTOMATED COUNT: 13.2 % (ref 12.3–15.4)
GFR SERPL CREATININE-BSD FRML MDRD: 14 ML/MIN/1.73
GFR SERPL CREATININE-BSD FRML MDRD: 16 ML/MIN/1.73
GFR SERPL CREATININE-BSD FRML MDRD: ABNORMAL ML/MIN/{1.73_M2}
GLOBULIN SER CALC-MCNC: 2.2 GM/DL
GLOBULIN UR ELPH-MCNC: 2.6 GM/DL
GLUCOSE SERPL-MCNC: 109 MG/DL (ref 65–99)
GLUCOSE SERPL-MCNC: 120 MG/DL (ref 65–99)
GLUCOSE SERPL-MCNC: 120 MG/DL (ref 65–99)
GLUCOSE UR STRIP-MCNC: NEGATIVE MG/DL
HCT VFR BLD AUTO: 33.7 % (ref 34–46.6)
HCT VFR BLD AUTO: 35.6 % (ref 34–46.6)
HGB BLD-MCNC: 12.2 G/DL (ref 12–15.9)
HGB BLD-MCNC: 12.3 G/DL (ref 12–15.9)
HGB UR QL STRIP.AUTO: ABNORMAL
IMM GRANULOCYTES # BLD AUTO: 0.05 10*3/MM3 (ref 0–0.05)
IMM GRANULOCYTES # BLD AUTO: 0.05 10*3/MM3 (ref 0–0.05)
IMM GRANULOCYTES NFR BLD AUTO: 0.5 % (ref 0–0.5)
IMM GRANULOCYTES NFR BLD AUTO: 0.5 % (ref 0–0.5)
INR PPP: 1.3 (ref 0.91–1.09)
KETONES UR QL STRIP: NEGATIVE
LEUKOCYTE ESTERASE UR QL STRIP.AUTO: ABNORMAL
LIPASE SERPL-CCNC: 48 U/L (ref 13–60)
LYMPHOCYTES # BLD AUTO: 1.82 10*3/MM3 (ref 0.7–3.1)
LYMPHOCYTES # BLD AUTO: 2.2 10*3/MM3 (ref 0.7–3.1)
LYMPHOCYTES NFR BLD AUTO: 18.3 % (ref 19.6–45.3)
LYMPHOCYTES NFR BLD AUTO: 20 % (ref 19.6–45.3)
MAGNESIUM SERPL-MCNC: 2.2 MG/DL (ref 1.6–2.4)
MCH RBC QN AUTO: 29.2 PG (ref 26.6–33)
MCH RBC QN AUTO: 30.1 PG (ref 26.6–33)
MCHC RBC AUTO-ENTMCNC: 34.3 G/DL (ref 31.5–35.7)
MCHC RBC AUTO-ENTMCNC: 36.5 G/DL (ref 31.5–35.7)
MCV RBC AUTO: 82.4 FL (ref 79–97)
MCV RBC AUTO: 85.2 FL (ref 79–97)
MONOCYTES # BLD AUTO: 0.73 10*3/MM3 (ref 0.1–0.9)
MONOCYTES # BLD AUTO: 0.85 10*3/MM3 (ref 0.1–0.9)
MONOCYTES NFR BLD AUTO: 7.3 % (ref 5–12)
MONOCYTES NFR BLD AUTO: 7.7 % (ref 5–12)
NEUTROPHILS # BLD AUTO: 7.19 10*3/MM3 (ref 1.7–7)
NEUTROPHILS NFR BLD AUTO: 6.65 10*3/MM3 (ref 1.7–7)
NEUTROPHILS NFR BLD AUTO: 65.6 % (ref 42.7–76)
NEUTROPHILS NFR BLD AUTO: 66.9 % (ref 42.7–76)
NITRITE UR QL STRIP: POSITIVE
NRBC BLD AUTO-RTO: 0 /100 WBC (ref 0–0.2)
NRBC BLD AUTO-RTO: 0 /100 WBC (ref 0–0.2)
PH UR STRIP.AUTO: 6.5 [PH] (ref 5–8)
PLATELET # BLD AUTO: 197 10*3/MM3 (ref 140–450)
PLATELET # BLD AUTO: 232 10*3/MM3 (ref 140–450)
PMV BLD AUTO: 10.7 FL (ref 6–12)
POTASSIUM SERPL-SCNC: 2.4 MMOL/L (ref 3.5–5.2)
POTASSIUM SERPL-SCNC: 2.4 MMOL/L (ref 3.5–5.2)
POTASSIUM SERPL-SCNC: 2.7 MMOL/L (ref 3.5–5.2)
PROT SERPL-MCNC: 6.8 G/DL (ref 6–8.5)
PROT SERPL-MCNC: 6.9 G/DL (ref 6–8.5)
PROT UR QL STRIP: ABNORMAL
PROTHROMBIN TIME: 15.2 SECONDS (ref 11.5–13.4)
RBC # BLD AUTO: 4.09 10*6/MM3 (ref 3.77–5.28)
RBC # BLD AUTO: 4.18 10*6/MM3 (ref 3.77–5.28)
RBC # UR: ABNORMAL /HPF
REF LAB TEST METHOD: ABNORMAL
SARS-COV-2 RNA PNL SPEC NAA+PROBE: NOT DETECTED
SODIUM SERPL-SCNC: 140 MMOL/L (ref 136–145)
SODIUM SERPL-SCNC: 142 MMOL/L (ref 136–145)
SODIUM SERPL-SCNC: 144 MMOL/L (ref 136–145)
SODIUM UR-SCNC: 89 MMOL/L
SP GR UR STRIP: 1.01 (ref 1–1.03)
SQUAMOUS #/AREA URNS HPF: ABNORMAL /HPF
T3FREE SERPL-MCNC: 2.2 PG/ML (ref 2–4.4)
T4 FREE SERPL-MCNC: 1.37 NG/DL (ref 0.93–1.7)
TROPONIN T SERPL-MCNC: 0.04 NG/ML (ref 0–0.03)
TSH SERPL DL<=0.005 MIU/L-ACNC: 2.54 UIU/ML (ref 0.27–4.2)
TSH SERPL DL<=0.05 MIU/L-ACNC: 3.5 UIU/ML (ref 0.27–4.2)
UROBILINOGEN UR QL STRIP: ABNORMAL
WBC # BLD AUTO: 10.98 10*3/MM3 (ref 3.4–10.8)
WBC # BLD AUTO: 9.95 10*3/MM3 (ref 3.4–10.8)
WBC UR QL AUTO: ABNORMAL /HPF

## 2021-09-10 PROCEDURE — 80053 COMPREHEN METABOLIC PANEL: CPT | Performed by: NURSE PRACTITIONER

## 2021-09-10 PROCEDURE — 84540 ASSAY OF URINE/UREA-N: CPT | Performed by: INTERNAL MEDICINE

## 2021-09-10 PROCEDURE — 99284 EMERGENCY DEPT VISIT MOD MDM: CPT

## 2021-09-10 PROCEDURE — 93005 ELECTROCARDIOGRAM TRACING: CPT | Performed by: NURSE PRACTITIONER

## 2021-09-10 PROCEDURE — 25010000002 POTASSIUM CHLORIDE PER 2 MEQ: Performed by: NURSE PRACTITIONER

## 2021-09-10 PROCEDURE — 94799 UNLISTED PULMONARY SVC/PX: CPT

## 2021-09-10 PROCEDURE — 25010000002 MAGNESIUM SULFATE 2 GM/50ML SOLUTION: Performed by: INTERNAL MEDICINE

## 2021-09-10 PROCEDURE — 82550 ASSAY OF CK (CPK): CPT | Performed by: INTERNAL MEDICINE

## 2021-09-10 PROCEDURE — 84300 ASSAY OF URINE SODIUM: CPT | Performed by: INTERNAL MEDICINE

## 2021-09-10 PROCEDURE — 84443 ASSAY THYROID STIM HORMONE: CPT | Performed by: NURSE PRACTITIONER

## 2021-09-10 PROCEDURE — 82550 ASSAY OF CK (CPK): CPT | Performed by: NURSE PRACTITIONER

## 2021-09-10 PROCEDURE — 84484 ASSAY OF TROPONIN QUANT: CPT | Performed by: NURSE PRACTITIONER

## 2021-09-10 PROCEDURE — 87086 URINE CULTURE/COLONY COUNT: CPT | Performed by: NURSE PRACTITIONER

## 2021-09-10 PROCEDURE — 94640 AIRWAY INHALATION TREATMENT: CPT

## 2021-09-10 PROCEDURE — 25010000002 CEFTRIAXONE PER 250 MG: Performed by: NURSE PRACTITIONER

## 2021-09-10 PROCEDURE — 87186 SC STD MICRODIL/AGAR DIL: CPT | Performed by: NURSE PRACTITIONER

## 2021-09-10 PROCEDURE — 87077 CULTURE AEROBIC IDENTIFY: CPT | Performed by: NURSE PRACTITIONER

## 2021-09-10 PROCEDURE — 87635 SARS-COV-2 COVID-19 AMP PRB: CPT | Performed by: NURSE PRACTITIONER

## 2021-09-10 PROCEDURE — 81001 URINALYSIS AUTO W/SCOPE: CPT | Performed by: NURSE PRACTITIONER

## 2021-09-10 PROCEDURE — 85025 COMPLETE CBC W/AUTO DIFF WBC: CPT | Performed by: NURSE PRACTITIONER

## 2021-09-10 PROCEDURE — 85610 PROTHROMBIN TIME: CPT | Performed by: NURSE PRACTITIONER

## 2021-09-10 PROCEDURE — 83735 ASSAY OF MAGNESIUM: CPT | Performed by: NURSE PRACTITIONER

## 2021-09-10 PROCEDURE — 25010000002 HEPARIN (PORCINE) PER 1000 UNITS: Performed by: INTERNAL MEDICINE

## 2021-09-10 PROCEDURE — 83690 ASSAY OF LIPASE: CPT | Performed by: NURSE PRACTITIONER

## 2021-09-10 PROCEDURE — 93010 ELECTROCARDIOGRAM REPORT: CPT | Performed by: INTERNAL MEDICINE

## 2021-09-10 PROCEDURE — 82570 ASSAY OF URINE CREATININE: CPT | Performed by: INTERNAL MEDICINE

## 2021-09-10 PROCEDURE — 71045 X-RAY EXAM CHEST 1 VIEW: CPT

## 2021-09-10 RX ORDER — AMLODIPINE BESYLATE 10 MG/1
10 TABLET ORAL DAILY
Status: DISCONTINUED | OUTPATIENT
Start: 2021-09-11 | End: 2021-09-12 | Stop reason: HOSPADM

## 2021-09-10 RX ORDER — METOPROLOL SUCCINATE 25 MG/1
25 TABLET, EXTENDED RELEASE ORAL DAILY
Status: DISCONTINUED | OUTPATIENT
Start: 2021-09-10 | End: 2021-09-12 | Stop reason: HOSPADM

## 2021-09-10 RX ORDER — ONDANSETRON 2 MG/ML
4 INJECTION INTRAMUSCULAR; INTRAVENOUS EVERY 6 HOURS PRN
Status: DISCONTINUED | OUTPATIENT
Start: 2021-09-10 | End: 2021-09-12 | Stop reason: HOSPADM

## 2021-09-10 RX ORDER — MAGNESIUM SULFATE HEPTAHYDRATE 40 MG/ML
2 INJECTION, SOLUTION INTRAVENOUS ONCE
Status: COMPLETED | OUTPATIENT
Start: 2021-09-10 | End: 2021-09-10

## 2021-09-10 RX ORDER — LEVOTHYROXINE SODIUM 88 UG/1
88 TABLET ORAL
Status: DISCONTINUED | OUTPATIENT
Start: 2021-09-11 | End: 2021-09-12 | Stop reason: HOSPADM

## 2021-09-10 RX ORDER — ACETAMINOPHEN 325 MG/1
650 TABLET ORAL EVERY 4 HOURS PRN
Status: DISCONTINUED | OUTPATIENT
Start: 2021-09-10 | End: 2021-09-12 | Stop reason: HOSPADM

## 2021-09-10 RX ORDER — ATORVASTATIN CALCIUM 10 MG/1
10 TABLET, FILM COATED ORAL NIGHTLY
Status: DISCONTINUED | OUTPATIENT
Start: 2021-09-10 | End: 2021-09-12 | Stop reason: HOSPADM

## 2021-09-10 RX ORDER — POTASSIUM CHLORIDE 750 MG/1
30 CAPSULE, EXTENDED RELEASE ORAL
Status: DISPENSED | OUTPATIENT
Start: 2021-09-10 | End: 2021-09-10

## 2021-09-10 RX ORDER — SODIUM CHLORIDE 0.9 % (FLUSH) 0.9 %
10 SYRINGE (ML) INJECTION AS NEEDED
Status: DISCONTINUED | OUTPATIENT
Start: 2021-09-10 | End: 2021-09-12 | Stop reason: HOSPADM

## 2021-09-10 RX ORDER — ONDANSETRON 4 MG/1
4 TABLET, FILM COATED ORAL EVERY 6 HOURS PRN
Status: DISCONTINUED | OUTPATIENT
Start: 2021-09-10 | End: 2021-09-12 | Stop reason: HOSPADM

## 2021-09-10 RX ORDER — HEPARIN SODIUM 5000 [USP'U]/ML
5000 INJECTION, SOLUTION INTRAVENOUS; SUBCUTANEOUS EVERY 8 HOURS SCHEDULED
Status: DISCONTINUED | OUTPATIENT
Start: 2021-09-10 | End: 2021-09-12 | Stop reason: HOSPADM

## 2021-09-10 RX ORDER — ALBUTEROL SULFATE 90 UG/1
2 AEROSOL, METERED RESPIRATORY (INHALATION)
Status: DISCONTINUED | OUTPATIENT
Start: 2021-09-10 | End: 2021-09-12 | Stop reason: HOSPADM

## 2021-09-10 RX ORDER — CLOPIDOGREL BISULFATE 75 MG/1
75 TABLET ORAL DAILY
Status: DISCONTINUED | OUTPATIENT
Start: 2021-09-10 | End: 2021-09-12 | Stop reason: HOSPADM

## 2021-09-10 RX ORDER — POTASSIUM CHLORIDE 14.9 MG/ML
20 INJECTION INTRAVENOUS ONCE
Status: COMPLETED | OUTPATIENT
Start: 2021-09-10 | End: 2021-09-10

## 2021-09-10 RX ORDER — MULTIPLE VITAMINS W/ MINERALS TAB 9MG-400MCG
1 TAB ORAL DAILY
Status: DISCONTINUED | OUTPATIENT
Start: 2021-09-10 | End: 2021-09-12 | Stop reason: HOSPADM

## 2021-09-10 RX ORDER — BUDESONIDE AND FORMOTEROL FUMARATE DIHYDRATE 160; 4.5 UG/1; UG/1
2 AEROSOL RESPIRATORY (INHALATION)
Refills: 5 | Status: DISCONTINUED | OUTPATIENT
Start: 2021-09-10 | End: 2021-09-12 | Stop reason: HOSPADM

## 2021-09-10 RX ORDER — SODIUM CHLORIDE 0.9 % (FLUSH) 0.9 %
10 SYRINGE (ML) INJECTION EVERY 12 HOURS SCHEDULED
Status: DISCONTINUED | OUTPATIENT
Start: 2021-09-10 | End: 2021-09-12 | Stop reason: HOSPADM

## 2021-09-10 RX ADMIN — HEPARIN SODIUM 5000 UNITS: 5000 INJECTION INTRAVENOUS; SUBCUTANEOUS at 23:16

## 2021-09-10 RX ADMIN — HEPARIN SODIUM 5000 UNITS: 5000 INJECTION INTRAVENOUS; SUBCUTANEOUS at 15:09

## 2021-09-10 RX ADMIN — SODIUM CHLORIDE, POTASSIUM CHLORIDE, SODIUM LACTATE AND CALCIUM CHLORIDE 1000 ML: 600; 310; 30; 20 INJECTION, SOLUTION INTRAVENOUS at 10:42

## 2021-09-10 RX ADMIN — ATORVASTATIN CALCIUM 10 MG: 10 TABLET, FILM COATED ORAL at 20:24

## 2021-09-10 RX ADMIN — Medication 1 TABLET: at 15:27

## 2021-09-10 RX ADMIN — MAGNESIUM SULFATE HEPTAHYDRATE 2 G: 2 INJECTION, SOLUTION INTRAVENOUS at 15:30

## 2021-09-10 RX ADMIN — POTASSIUM CHLORIDE 20 MEQ: 14.9 INJECTION, SOLUTION INTRAVENOUS at 12:41

## 2021-09-10 RX ADMIN — CLOPIDOGREL 75 MG: 75 TABLET, FILM COATED ORAL at 18:04

## 2021-09-10 RX ADMIN — SODIUM BICARBONATE 150 MEQ: 84 INJECTION INTRAVENOUS at 15:07

## 2021-09-10 RX ADMIN — POTASSIUM CHLORIDE 30 MEQ: 10 CAPSULE, COATED, EXTENDED RELEASE ORAL at 15:08

## 2021-09-10 RX ADMIN — ALBUTEROL SULFATE 2 PUFF: 90 AEROSOL, METERED RESPIRATORY (INHALATION) at 15:40

## 2021-09-10 RX ADMIN — ALBUTEROL SULFATE 2 PUFF: 90 AEROSOL, METERED RESPIRATORY (INHALATION) at 18:56

## 2021-09-10 RX ADMIN — CEFTRIAXONE 1 G: 1 INJECTION, POWDER, FOR SOLUTION INTRAMUSCULAR; INTRAVENOUS at 12:04

## 2021-09-10 RX ADMIN — SODIUM CHLORIDE, PRESERVATIVE FREE 10 ML: 5 INJECTION INTRAVENOUS at 20:26

## 2021-09-10 NOTE — TELEPHONE ENCOUNTER
Received call from Dr. Rubio informed of critical lab values.  Spoke with ER and informed of patient's pending arrival.  Called and spoke with patient and informed of her labs showing renal failure and critical potassium levels.  Instructed to go to ER now for likely admission.  She verbalized understanding and agreed to go.

## 2021-09-10 NOTE — TELEPHONE ENCOUNTER
"Call in from Samantha at Lab states K+ 2.4. Call place to Dr. Rubio and he states call pt and have her take two of her K-Dur now and then have her start taking two tabs TID over the next couple days and call office on Monday. Ms. Nicolas aware will need repeat K+ to make sure improved. MD states tell her any palpitations or not feeling well go to ER. Ms. Nicolas was called and was given MD instruction. She states not sure I'll have enough pills to get me by but I'll count them in the morning. Caller states she will check her amount and call us or Dr. Rubio office this morning if in need of refill called in today. Caller advised to call back as needed.           Reason for Disposition  • Lab or radiology calling with CRITICAL test results    Additional Information  • Negative: Lab calling with strep throat test results and triager can call in prescription  • Negative: Lab calling with urinalysis test results and triager can call in prescription  • Negative: Medication questions  • Negative: ED call to PCP  • Negative: Physician call to PCP  • Negative: Call about patient who is currently hospitalized    Answer Assessment - Initial Assessment Questions  1. REASON FOR CALL or QUESTION: \"What is your reason for calling today?\" or \"How can I best  help you?\" or \"What question do you have that I can help answer?\"     Lab call in from Samantha with K+ 2.4   2. CALLER: Document the source of call. (e.g., laboratory, patient).      Lab    Protocols used: PCP CALL - NO TRIAGE-ADULT-AH      "

## 2021-09-11 PROBLEM — N30.00 ACUTE CYSTITIS: Status: ACTIVE | Noted: 2021-09-11

## 2021-09-11 LAB
25(OH)D3 SERPL-MCNC: 24.2 NG/ML (ref 30–100)
ALBUMIN SERPL-MCNC: 4.1 G/DL (ref 3.5–5.2)
ALBUMIN/GLOB SERPL: 1.7 G/DL
ALP SERPL-CCNC: 168 U/L (ref 39–117)
ALT SERPL W P-5'-P-CCNC: 12 U/L (ref 1–33)
ANION GAP SERPL CALCULATED.3IONS-SCNC: 11 MMOL/L (ref 5–15)
ANION GAP SERPL CALCULATED.3IONS-SCNC: 15 MMOL/L (ref 5–15)
AST SERPL-CCNC: 17 U/L (ref 1–32)
BASOPHILS # BLD AUTO: 0.04 10*3/MM3 (ref 0–0.2)
BASOPHILS NFR BLD AUTO: 0.4 % (ref 0–1.5)
BILIRUB SERPL-MCNC: 0.4 MG/DL (ref 0–1.2)
BUN SERPL-MCNC: 48 MG/DL (ref 8–23)
BUN SERPL-MCNC: 57 MG/DL (ref 8–23)
BUN/CREAT SERPL: 19.9 (ref 7–25)
BUN/CREAT SERPL: 22.7 (ref 7–25)
CALCIUM SPEC-SCNC: 8.7 MG/DL (ref 8.6–10.5)
CALCIUM SPEC-SCNC: 8.9 MG/DL (ref 8.6–10.5)
CHLORIDE SERPL-SCNC: 107 MMOL/L (ref 98–107)
CHLORIDE SERPL-SCNC: 107 MMOL/L (ref 98–107)
CK SERPL-CCNC: 191 U/L (ref 20–180)
CO2 SERPL-SCNC: 21 MMOL/L (ref 22–29)
CO2 SERPL-SCNC: 27 MMOL/L (ref 22–29)
CREAT SERPL-MCNC: 2.41 MG/DL (ref 0.57–1)
CREAT SERPL-MCNC: 2.51 MG/DL (ref 0.57–1)
CREAT UR-MCNC: 31.9 MG/DL
DEPRECATED RDW RBC AUTO: 37.1 FL (ref 37–54)
EOSINOPHIL # BLD AUTO: 0.74 10*3/MM3 (ref 0–0.4)
EOSINOPHIL NFR BLD AUTO: 7.4 % (ref 0.3–6.2)
ERYTHROCYTE [DISTWIDTH] IN BLOOD BY AUTOMATED COUNT: 13.2 % (ref 12.3–15.4)
GFR SERPL CREATININE-BSD FRML MDRD: 18 ML/MIN/1.73
GFR SERPL CREATININE-BSD FRML MDRD: 19 ML/MIN/1.73
GLOBULIN UR ELPH-MCNC: 2.4 GM/DL
GLUCOSE SERPL-MCNC: 110 MG/DL (ref 65–99)
GLUCOSE SERPL-MCNC: 111 MG/DL (ref 65–99)
HBA1C MFR BLD: 5.4 % (ref 4.8–5.6)
HCT VFR BLD AUTO: 31.4 % (ref 34–46.6)
HGB BLD-MCNC: 11.4 G/DL (ref 12–15.9)
IMM GRANULOCYTES # BLD AUTO: 0.04 10*3/MM3 (ref 0–0.05)
IMM GRANULOCYTES NFR BLD AUTO: 0.4 % (ref 0–0.5)
LYMPHOCYTES # BLD AUTO: 2.71 10*3/MM3 (ref 0.7–3.1)
LYMPHOCYTES NFR BLD AUTO: 27.2 % (ref 19.6–45.3)
MAGNESIUM SERPL-MCNC: 2.3 MG/DL (ref 1.6–2.4)
MAGNESIUM SERPL-MCNC: 2.6 MG/DL (ref 1.6–2.4)
MCH RBC QN AUTO: 29.4 PG (ref 26.6–33)
MCHC RBC AUTO-ENTMCNC: 36.3 G/DL (ref 31.5–35.7)
MCV RBC AUTO: 80.9 FL (ref 79–97)
MONOCYTES # BLD AUTO: 0.66 10*3/MM3 (ref 0.1–0.9)
MONOCYTES NFR BLD AUTO: 6.6 % (ref 5–12)
NEUTROPHILS NFR BLD AUTO: 5.77 10*3/MM3 (ref 1.7–7)
NEUTROPHILS NFR BLD AUTO: 58 % (ref 42.7–76)
NRBC BLD AUTO-RTO: 0 /100 WBC (ref 0–0.2)
PHOSPHATE SERPL-MCNC: 2.7 MG/DL (ref 2.5–4.5)
PLATELET # BLD AUTO: 208 10*3/MM3 (ref 140–450)
PMV BLD AUTO: 11.1 FL (ref 6–12)
POTASSIUM SERPL-SCNC: 2.4 MMOL/L (ref 3.5–5.2)
POTASSIUM SERPL-SCNC: 2.8 MMOL/L (ref 3.5–5.2)
PROCALCITONIN SERPL-MCNC: 0.19 NG/ML (ref 0–0.25)
PROT SERPL-MCNC: 6.5 G/DL (ref 6–8.5)
PTH-INTACT SERPL-MCNC: 192.4 PG/ML (ref 15–65)
QT INTERVAL: 430 MS
QTC INTERVAL: 436 MS
RBC # BLD AUTO: 3.88 10*6/MM3 (ref 3.77–5.28)
SODIUM SERPL-SCNC: 143 MMOL/L (ref 136–145)
SODIUM SERPL-SCNC: 145 MMOL/L (ref 136–145)
TSH SERPL DL<=0.05 MIU/L-ACNC: 4.01 UIU/ML (ref 0.27–4.2)
UUN 24H UR-MCNC: 329 MG/DL
WBC # BLD AUTO: 9.96 10*3/MM3 (ref 3.4–10.8)
WHOLE BLOOD HOLD SPECIMEN: NORMAL

## 2021-09-11 PROCEDURE — 84443 ASSAY THYROID STIM HORMONE: CPT | Performed by: INTERNAL MEDICINE

## 2021-09-11 PROCEDURE — 84145 PROCALCITONIN (PCT): CPT | Performed by: INTERNAL MEDICINE

## 2021-09-11 PROCEDURE — 83735 ASSAY OF MAGNESIUM: CPT | Performed by: INTERNAL MEDICINE

## 2021-09-11 PROCEDURE — 84100 ASSAY OF PHOSPHORUS: CPT | Performed by: INTERNAL MEDICINE

## 2021-09-11 PROCEDURE — 25010000002 MAGNESIUM SULFATE 2 GM/50ML SOLUTION: Performed by: INTERNAL MEDICINE

## 2021-09-11 PROCEDURE — 83036 HEMOGLOBIN GLYCOSYLATED A1C: CPT | Performed by: INTERNAL MEDICINE

## 2021-09-11 PROCEDURE — 36415 COLL VENOUS BLD VENIPUNCTURE: CPT | Performed by: INTERNAL MEDICINE

## 2021-09-11 PROCEDURE — 25010000002 HEPARIN (PORCINE) PER 1000 UNITS: Performed by: INTERNAL MEDICINE

## 2021-09-11 PROCEDURE — 85025 COMPLETE CBC W/AUTO DIFF WBC: CPT | Performed by: INTERNAL MEDICINE

## 2021-09-11 PROCEDURE — 83970 ASSAY OF PARATHORMONE: CPT | Performed by: INTERNAL MEDICINE

## 2021-09-11 PROCEDURE — 94799 UNLISTED PULMONARY SVC/PX: CPT

## 2021-09-11 PROCEDURE — 82306 VITAMIN D 25 HYDROXY: CPT | Performed by: INTERNAL MEDICINE

## 2021-09-11 PROCEDURE — 25010000002 SODIUM CHLORIDE 0.9 % WITH KCL 20 MEQ 20-0.9 MEQ/L-% SOLUTION: Performed by: INTERNAL MEDICINE

## 2021-09-11 PROCEDURE — 82550 ASSAY OF CK (CPK): CPT | Performed by: INTERNAL MEDICINE

## 2021-09-11 PROCEDURE — 80053 COMPREHEN METABOLIC PANEL: CPT | Performed by: INTERNAL MEDICINE

## 2021-09-11 PROCEDURE — 25010000002 CEFTRIAXONE PER 250 MG: Performed by: INTERNAL MEDICINE

## 2021-09-11 RX ORDER — MELATONIN
2000 DAILY
Status: DISCONTINUED | OUTPATIENT
Start: 2021-09-11 | End: 2021-09-12 | Stop reason: HOSPADM

## 2021-09-11 RX ORDER — POTASSIUM CHLORIDE 750 MG/1
40 CAPSULE, EXTENDED RELEASE ORAL
Status: COMPLETED | OUTPATIENT
Start: 2021-09-11 | End: 2021-09-11

## 2021-09-11 RX ORDER — SODIUM CHLORIDE AND POTASSIUM CHLORIDE 150; 900 MG/100ML; MG/100ML
50 INJECTION, SOLUTION INTRAVENOUS CONTINUOUS
Status: DISCONTINUED | OUTPATIENT
Start: 2021-09-11 | End: 2021-09-12

## 2021-09-11 RX ORDER — POTASSIUM CHLORIDE 750 MG/1
40 CAPSULE, EXTENDED RELEASE ORAL
Status: COMPLETED | OUTPATIENT
Start: 2021-09-11 | End: 2021-09-12

## 2021-09-11 RX ORDER — MAGNESIUM SULFATE HEPTAHYDRATE 40 MG/ML
2 INJECTION, SOLUTION INTRAVENOUS ONCE
Status: COMPLETED | OUTPATIENT
Start: 2021-09-11 | End: 2021-09-11

## 2021-09-11 RX ADMIN — AMLODIPINE BESYLATE 10 MG: 10 TABLET ORAL at 08:57

## 2021-09-11 RX ADMIN — SODIUM CHLORIDE, PRESERVATIVE FREE 10 ML: 5 INJECTION INTRAVENOUS at 08:57

## 2021-09-11 RX ADMIN — CLOPIDOGREL 75 MG: 75 TABLET, FILM COATED ORAL at 18:02

## 2021-09-11 RX ADMIN — Medication 2000 UNITS: at 20:22

## 2021-09-11 RX ADMIN — POTASSIUM CHLORIDE 40 MEQ: 10 CAPSULE, COATED, EXTENDED RELEASE ORAL at 20:23

## 2021-09-11 RX ADMIN — POTASSIUM CHLORIDE 40 MEQ: 10 CAPSULE, COATED, EXTENDED RELEASE ORAL at 08:55

## 2021-09-11 RX ADMIN — HEPARIN SODIUM 5000 UNITS: 5000 INJECTION INTRAVENOUS; SUBCUTANEOUS at 20:23

## 2021-09-11 RX ADMIN — SODIUM BICARBONATE 150 MEQ: 84 INJECTION INTRAVENOUS at 08:57

## 2021-09-11 RX ADMIN — HEPARIN SODIUM 5000 UNITS: 5000 INJECTION INTRAVENOUS; SUBCUTANEOUS at 06:05

## 2021-09-11 RX ADMIN — LEVOTHYROXINE SODIUM 88 MCG: 88 TABLET ORAL at 06:05

## 2021-09-11 RX ADMIN — POTASSIUM CHLORIDE AND SODIUM CHLORIDE 50 ML/HR: 900; 150 INJECTION, SOLUTION INTRAVENOUS at 18:02

## 2021-09-11 RX ADMIN — METOPROLOL SUCCINATE 25 MG: 25 TABLET, EXTENDED RELEASE ORAL at 08:55

## 2021-09-11 RX ADMIN — ALBUTEROL SULFATE 2 PUFF: 90 AEROSOL, METERED RESPIRATORY (INHALATION) at 07:50

## 2021-09-11 RX ADMIN — ALBUTEROL SULFATE 2 PUFF: 90 AEROSOL, METERED RESPIRATORY (INHALATION) at 14:42

## 2021-09-11 RX ADMIN — HEPARIN SODIUM 5000 UNITS: 5000 INJECTION INTRAVENOUS; SUBCUTANEOUS at 14:12

## 2021-09-11 RX ADMIN — CEFTRIAXONE 1 G: 1 INJECTION, POWDER, FOR SOLUTION INTRAMUSCULAR; INTRAVENOUS at 12:30

## 2021-09-11 RX ADMIN — ATORVASTATIN CALCIUM 10 MG: 10 TABLET, FILM COATED ORAL at 20:23

## 2021-09-11 RX ADMIN — POTASSIUM CHLORIDE 40 MEQ: 10 CAPSULE, COATED, EXTENDED RELEASE ORAL at 12:30

## 2021-09-11 RX ADMIN — POTASSIUM CHLORIDE 40 MEQ: 10 CAPSULE, COATED, EXTENDED RELEASE ORAL at 18:02

## 2021-09-11 RX ADMIN — SODIUM CHLORIDE, PRESERVATIVE FREE 10 ML: 5 INJECTION INTRAVENOUS at 20:24

## 2021-09-11 RX ADMIN — MAGNESIUM SULFATE HEPTAHYDRATE 2 G: 2 INJECTION, SOLUTION INTRAVENOUS at 18:03

## 2021-09-12 ENCOUNTER — READMISSION MANAGEMENT (OUTPATIENT)
Dept: CALL CENTER | Facility: HOSPITAL | Age: 86
End: 2021-09-12

## 2021-09-12 VITALS
OXYGEN SATURATION: 97 % | WEIGHT: 134.7 LBS | TEMPERATURE: 98.5 F | HEIGHT: 60 IN | SYSTOLIC BLOOD PRESSURE: 102 MMHG | RESPIRATION RATE: 16 BRPM | HEART RATE: 78 BPM | DIASTOLIC BLOOD PRESSURE: 56 MMHG | BODY MASS INDEX: 26.45 KG/M2

## 2021-09-12 LAB
ALBUMIN SERPL-MCNC: 3.5 G/DL (ref 3.5–5.2)
ANION GAP SERPL CALCULATED.3IONS-SCNC: 9 MMOL/L (ref 5–15)
BACTERIA SPEC AEROBE CULT: ABNORMAL
BUN SERPL-MCNC: 47 MG/DL (ref 8–23)
BUN/CREAT SERPL: 20 (ref 7–25)
CALCIUM SPEC-SCNC: 8.1 MG/DL (ref 8.6–10.5)
CHLORIDE SERPL-SCNC: 112 MMOL/L (ref 98–107)
CO2 SERPL-SCNC: 23 MMOL/L (ref 22–29)
CREAT SERPL-MCNC: 2.35 MG/DL (ref 0.57–1)
GFR SERPL CREATININE-BSD FRML MDRD: 20 ML/MIN/1.73
GLUCOSE SERPL-MCNC: 107 MG/DL (ref 65–99)
MAGNESIUM SERPL-MCNC: 2.6 MG/DL (ref 1.6–2.4)
PHOSPHATE SERPL-MCNC: 2.4 MG/DL (ref 2.5–4.5)
POTASSIUM SERPL-SCNC: 3.7 MMOL/L (ref 3.5–5.2)
SODIUM SERPL-SCNC: 144 MMOL/L (ref 136–145)

## 2021-09-12 PROCEDURE — 94799 UNLISTED PULMONARY SVC/PX: CPT

## 2021-09-12 PROCEDURE — 83735 ASSAY OF MAGNESIUM: CPT | Performed by: INTERNAL MEDICINE

## 2021-09-12 PROCEDURE — 80069 RENAL FUNCTION PANEL: CPT | Performed by: INTERNAL MEDICINE

## 2021-09-12 PROCEDURE — 25010000002 HEPARIN (PORCINE) PER 1000 UNITS: Performed by: INTERNAL MEDICINE

## 2021-09-12 RX ORDER — CEFDINIR 300 MG/1
300 CAPSULE ORAL
Qty: 2 CAPSULE | Refills: 0 | Status: SHIPPED | OUTPATIENT
Start: 2021-09-13 | End: 2021-09-15

## 2021-09-12 RX ORDER — CEFDINIR 300 MG/1
300 CAPSULE ORAL
Qty: 2 CAPSULE | Refills: 0 | Status: SHIPPED | OUTPATIENT
Start: 2021-09-13 | End: 2021-09-12

## 2021-09-12 RX ORDER — CEFDINIR 300 MG/1
300 CAPSULE ORAL
Status: DISCONTINUED | OUTPATIENT
Start: 2021-09-12 | End: 2021-09-12 | Stop reason: HOSPADM

## 2021-09-12 RX ADMIN — AMLODIPINE BESYLATE 10 MG: 10 TABLET ORAL at 09:02

## 2021-09-12 RX ADMIN — LEVOTHYROXINE SODIUM 88 MCG: 88 TABLET ORAL at 05:16

## 2021-09-12 RX ADMIN — CEFDINIR 300 MG: 300 CAPSULE ORAL at 10:06

## 2021-09-12 RX ADMIN — ALBUTEROL SULFATE 2 PUFF: 90 AEROSOL, METERED RESPIRATORY (INHALATION) at 11:59

## 2021-09-12 RX ADMIN — METOPROLOL SUCCINATE 25 MG: 25 TABLET, EXTENDED RELEASE ORAL at 09:02

## 2021-09-12 RX ADMIN — ALBUTEROL SULFATE 2 PUFF: 90 AEROSOL, METERED RESPIRATORY (INHALATION) at 07:13

## 2021-09-12 RX ADMIN — Medication 2000 UNITS: at 09:02

## 2021-09-12 RX ADMIN — POTASSIUM & SODIUM PHOSPHATES POWDER PACK 280-160-250 MG 1 PACKET: 280-160-250 PACK at 10:06

## 2021-09-12 RX ADMIN — POTASSIUM CHLORIDE 40 MEQ: 10 CAPSULE, COATED, EXTENDED RELEASE ORAL at 05:16

## 2021-09-12 RX ADMIN — HEPARIN SODIUM 5000 UNITS: 5000 INJECTION INTRAVENOUS; SUBCUTANEOUS at 05:16

## 2021-09-13 ENCOUNTER — TRANSITIONAL CARE MANAGEMENT TELEPHONE ENCOUNTER (OUTPATIENT)
Dept: CALL CENTER | Facility: HOSPITAL | Age: 86
End: 2021-09-13

## 2021-09-13 NOTE — OUTREACH NOTE
Call Center TCM Note      Responses   [facility] patient discharged from?  [facility]   Does the patient have one of the following disease processes/diagnoses(primary or secondary)?  Other   TCM attempt successful?  Yes   Call start time  1530   Call end time  1532   Discharge diagnosis  WALI, hypokalemia, UTI   Meds reviewed with patient/caregiver?  Yes   Is the patient having any side effects they believe may be caused by any medication additions or changes?  No   Does the patient have all medications ordered at discharge?  Yes   Is the patient taking all medications as directed (includes completed medication regime)?  Yes   Does the patient have a primary care provider?   Yes   Does the patient have an appointment with their PCP within 7 days of discharge?  Greater than 7 days   Comments regarding PCP  PCP APPOINTMENT IS 9/23/21   What is preventing the patient from scheduling follow up appointments within 7 days of discharge?  Earlier appointment not available   Nursing Interventions  Verified appointment date/time/provider   Has the patient kept scheduled appointments due by today?  N/A   Has home health visited the patient within 72 hours of discharge?  N/A   Psychosocial issues?  No   Did the patient receive a copy of their discharge instructions?  Yes   Nursing interventions  Reviewed instructions with patient   What is the patient's perception of their health status since discharge?  Improving   Is the patient/caregiver able to teach back signs and symptoms related to disease process for when to call PCP?  Yes   Is the patient/caregiver able to teach back signs and symptoms related to disease process for when to call 911?  Yes   Is the patient/caregiver able to teach back the hierarchy of who to call/visit for symptoms/problems? PCP, Specialist, Home health nurse, Urgent Care, ED, 911  Yes   If the patient is a current smoker, are they able to teach back resources for cessation?  Not a smoker   TCM call  completed?  Yes          Deborah Weber LPN    9/13/2021, 15:32 EDT

## 2021-09-20 ENCOUNTER — OFFICE VISIT (OUTPATIENT)
Dept: INTERNAL MEDICINE | Facility: CLINIC | Age: 86
End: 2021-09-20

## 2021-09-20 VITALS
BODY MASS INDEX: 27.68 KG/M2 | TEMPERATURE: 97.6 F | OXYGEN SATURATION: 98 % | HEIGHT: 60 IN | DIASTOLIC BLOOD PRESSURE: 48 MMHG | SYSTOLIC BLOOD PRESSURE: 108 MMHG | HEART RATE: 72 BPM | WEIGHT: 141 LBS

## 2021-09-20 DIAGNOSIS — N17.0 ACUTE RENAL FAILURE WITH TUBULAR NECROSIS (HCC): Primary | ICD-10-CM

## 2021-09-20 DIAGNOSIS — I10 HYPERTENSION, BENIGN: ICD-10-CM

## 2021-09-20 DIAGNOSIS — E53.8 VITAMIN B12 DEFICIENCY: ICD-10-CM

## 2021-09-20 DIAGNOSIS — E03.9 HYPOTHYROIDISM (ACQUIRED): ICD-10-CM

## 2021-09-20 DIAGNOSIS — N30.00 ACUTE CYSTITIS WITHOUT HEMATURIA: ICD-10-CM

## 2021-09-20 PROCEDURE — 81003 URINALYSIS AUTO W/O SCOPE: CPT | Performed by: INTERNAL MEDICINE

## 2021-09-20 PROCEDURE — 99495 TRANSJ CARE MGMT MOD F2F 14D: CPT | Performed by: INTERNAL MEDICINE

## 2021-09-20 PROCEDURE — 1111F DSCHRG MED/CURRENT MED MERGE: CPT | Performed by: INTERNAL MEDICINE

## 2021-09-21 LAB
BILIRUB BLD-MCNC: NEGATIVE MG/DL
CLARITY, POC: CLEAR
COLOR UR: YELLOW
GLUCOSE UR STRIP-MCNC: NEGATIVE MG/DL
KETONES UR QL: NEGATIVE
LEUKOCYTE EST, POC: NEGATIVE
NITRITE UR-MCNC: NEGATIVE MG/ML
PH UR: 7 [PH] (ref 5–8)
PROT UR STRIP-MCNC: NEGATIVE MG/DL
RBC # UR STRIP: NEGATIVE /UL
SP GR UR: 1.01 (ref 1–1.03)
UROBILINOGEN UR QL: NORMAL

## 2021-09-22 ENCOUNTER — READMISSION MANAGEMENT (OUTPATIENT)
Dept: CALL CENTER | Facility: HOSPITAL | Age: 86
End: 2021-09-22

## 2021-09-22 NOTE — OUTREACH NOTE
Medical Week 2 Survey      Responses   [facility] patient discharged from?  [facility]   Does the patient have one of the following disease processes/diagnoses(primary or secondary)?  Other   Week 2 attempt successful?  Yes   Call start time  1551   Discharge diagnosis  WALI, hypokalemia, UTI   Call end time  1553   Person spoke with today (if not patient) and relationship  Patient   Meds reviewed with patient/caregiver?  Yes   Is the patient having any side effects they believe may be caused by any medication additions or changes?  No   Does the patient have all medications ordered at discharge?  Yes   Is the patient taking all medications as directed (includes completed medication regime)?  Yes   Does the patient have a primary care provider?   Yes   Does the patient have an appointment with their PCP within 7 days of discharge?  Yes   Comments regarding PCP  PCP fu 9/20/21   Has the patient kept scheduled appointments due by today?  N/A   Psychosocial issues?  No   Did the patient receive a copy of their discharge instructions?  Yes   Nursing interventions  Reviewed instructions with patient   What is the patient's perception of their health status since discharge?  Improving   Is the patient/caregiver able to teach back signs and symptoms related to disease process for when to call PCP?  Yes   Is the patient/caregiver able to teach back signs and symptoms related to disease process for when to call 911?  Yes   Is the patient/caregiver able to teach back the hierarchy of who to call/visit for symptoms/problems? PCP, Specialist, Home health nurse, Urgent Care, ED, 911  Yes   If the patient is a current smoker, are they able to teach back resources for cessation?  Not a smoker   Week 2 Call Completed?  Yes   Wrap up additional comments  Pt states she is doing better. Pt had PCP fu appt on 9/20/21. No questions/concerns.          Zohra Alarcon RN

## 2021-09-27 ENCOUNTER — HOSPITAL ENCOUNTER (OUTPATIENT)
Dept: ULTRASOUND IMAGING | Facility: HOSPITAL | Age: 86
Discharge: HOME | End: 2021-09-27
Admitting: INTERNAL MEDICINE

## 2021-09-27 DIAGNOSIS — N17.0 ACUTE RENAL FAILURE WITH TUBULAR NECROSIS (HCC): ICD-10-CM

## 2021-09-27 PROCEDURE — 76775 US EXAM ABDO BACK WALL LIM: CPT

## 2021-09-28 ENCOUNTER — TELEPHONE (OUTPATIENT)
Dept: INTERNAL MEDICINE | Facility: CLINIC | Age: 86
End: 2021-09-28

## 2021-09-28 NOTE — TELEPHONE ENCOUNTER
----- Message from Alexis Rubio MD sent at 9/27/2021  2:07 PM CDT -----  Results are normal with the exception of a cyst on the right kidney

## 2021-10-11 ENCOUNTER — OFFICE VISIT (OUTPATIENT)
Dept: INTERNAL MEDICINE | Facility: CLINIC | Age: 86
End: 2021-10-11

## 2021-10-11 ENCOUNTER — LAB (OUTPATIENT)
Dept: LAB | Facility: HOSPITAL | Age: 86
End: 2021-10-11

## 2021-10-11 VITALS
HEART RATE: 74 BPM | TEMPERATURE: 97.1 F | DIASTOLIC BLOOD PRESSURE: 74 MMHG | HEIGHT: 60 IN | WEIGHT: 137 LBS | BODY MASS INDEX: 26.9 KG/M2 | SYSTOLIC BLOOD PRESSURE: 136 MMHG | OXYGEN SATURATION: 98 %

## 2021-10-11 DIAGNOSIS — Z23 NEED FOR INFLUENZA VACCINATION: Primary | ICD-10-CM

## 2021-10-11 DIAGNOSIS — M54.32 SCIATIC NERVE PAIN, LEFT: ICD-10-CM

## 2021-10-11 DIAGNOSIS — N17.0 ACUTE RENAL FAILURE WITH TUBULAR NECROSIS (HCC): ICD-10-CM

## 2021-10-11 DIAGNOSIS — M54.42 ACUTE LEFT-SIDED LOW BACK PAIN WITH LEFT-SIDED SCIATICA: ICD-10-CM

## 2021-10-11 DIAGNOSIS — N17.0 ACUTE RENAL FAILURE WITH TUBULAR NECROSIS (HCC): Primary | ICD-10-CM

## 2021-10-11 LAB
ANION GAP SERPL CALCULATED.3IONS-SCNC: 13 MMOL/L (ref 4–13)
BUN SERPL-MCNC: 33 MG/DL (ref 5–21)
BUN/CREAT SERPL: 16.5
CALCIUM SPEC-SCNC: 9.1 MG/DL (ref 8.4–10.4)
CHLORIDE SERPL-SCNC: 115 MMOL/L (ref 98–110)
CO2 SERPL-SCNC: 14 MMOL/L (ref 24–31)
CREAT SERPL-MCNC: 2 MG/DL (ref 0.5–1.4)
GFR SERPL CREATININE-BSD FRML MDRD: 24 ML/MIN/1.73
GLUCOSE SERPL-MCNC: 86 MG/DL (ref 70–100)
POTASSIUM SERPL-SCNC: 3.4 MMOL/L (ref 3.5–5.3)
SODIUM SERPL-SCNC: 142 MMOL/L (ref 135–145)

## 2021-10-11 PROCEDURE — 36415 COLL VENOUS BLD VENIPUNCTURE: CPT

## 2021-10-11 PROCEDURE — 99213 OFFICE O/P EST LOW 20 MIN: CPT | Performed by: NURSE PRACTITIONER

## 2021-10-11 PROCEDURE — G0008 ADMIN INFLUENZA VIRUS VAC: HCPCS | Performed by: NURSE PRACTITIONER

## 2021-10-11 PROCEDURE — 90662 IIV NO PRSV INCREASED AG IM: CPT | Performed by: NURSE PRACTITIONER

## 2021-10-11 PROCEDURE — 80048 BASIC METABOLIC PNL TOTAL CA: CPT

## 2021-10-11 RX ORDER — METHYLPREDNISOLONE 4 MG/1
TABLET ORAL
Qty: 1 TABLET | Refills: 0 | Status: SHIPPED | OUTPATIENT
Start: 2021-10-11 | End: 2021-10-24 | Stop reason: HOSPADM

## 2021-10-11 NOTE — PROGRESS NOTES
Subjective   Alka Nicolas is a 86 y.o. female.   Chief Complaint   Patient presents with   • Back Pain     x2-3 days left lower/hip   • Leg Problem     caroline Rucker is an 86-year-old female who presents to the office today related to back pain that started on Friday this past week.  Patient states that lower back pain at the level of L4-L5 radiating to the back of her leg wrapping to the lateral side extending down to her foot by today.  She reports the pain is sharp shooting and constant.  She does report some intermittent tingling in her foot.  She reports pain with ambulation that is worse in her back and upper thigh.  She states that she had a similar episode 4 to 5 years ago and it being sciatic nerve pain.  She states that symptoms are alleviated when she gets walking.  She states that they are aggravated by laying flat, or bending forward or extending back.  She denies any acute injury or accident.  She denies any history of lower back pain in the past.  She does have a history of arthritis.    Back Pain  This is a new problem. The current episode started in the past 7 days. The problem occurs constantly. The problem has been gradually worsening since onset. The pain is present in the thoracic spine, lumbar spine and gluteal. The quality of the pain is described as aching, burning and shooting. The pain radiates to the left foot. The pain is at a severity of 6/10. The pain is moderate. The pain is worse during the night. The symptoms are aggravated by lying down. Stiffness is present in the morning. Associated symptoms include leg pain and numbness (Intermittently in her foot). Pertinent negatives include no abdominal pain, bladder incontinence, bowel incontinence, chest pain, dysuria, fever, headaches, paresis, paresthesias, pelvic pain, perianal numbness, tingling, weakness or weight loss. Risk factors include menopause. She has tried NSAIDs and walking for the symptoms. The treatment provided  mild relief.        The following portions of the patient's history were reviewed and updated as appropriate: allergies, current medications, past family history, past medical history, past social history, past surgical history and problem list.    Review of Systems   Constitutional: Negative for activity change, appetite change, fatigue, fever, unexpected weight gain and unexpected weight loss.   HENT: Negative for swollen glands, trouble swallowing and voice change.    Eyes: Negative for blurred vision and visual disturbance.   Respiratory: Negative for cough and shortness of breath.    Cardiovascular: Negative for chest pain, palpitations and leg swelling.   Gastrointestinal: Negative for abdominal pain, bowel incontinence, constipation, diarrhea, nausea, vomiting and indigestion.   Endocrine: Negative for cold intolerance, heat intolerance, polydipsia and polyphagia.   Genitourinary: Negative for dysuria, frequency, pelvic pain and urinary incontinence.   Musculoskeletal: Positive for arthralgias, back pain and gait problem. Negative for joint swelling and neck pain.   Skin: Negative for color change, rash and skin lesions.   Neurological: Positive for numbness (Intermittently in her foot). Negative for dizziness, tingling, weakness, headache, paresthesias, memory problem and confusion.   Hematological: Does not bruise/bleed easily.   Psychiatric/Behavioral: Negative for agitation, hallucinations and suicidal ideas. The patient is not nervous/anxious.        Objective   Past Medical History:   Diagnosis Date   • Abdominal aortic aneurysm (HCC)    • Angina pectoris (HCC)    • Atherosclerosis of native coronary artery of native heart without angina pectoris    • CAD (coronary artery disease)    • Esophageal reflux    • GERD (gastroesophageal reflux disease)    • History of PTCA    • Hyperlipidemia    • Hypertension    • Hypertension, essential, benign    • Tobacco use disorder       Past Surgical History:    Procedure Laterality Date   • APPENDECTOMY     • CARDIAC CATHETERIZATION     • CARDIAC CATHETERIZATION Left 10/21/2020    Procedure: Cardiac Catheterization/Vascular Study;  Surgeon: Marcos Ratliff MD;  Location:  [facility];  Service: Cardiology;  Laterality: Left;   • CHOLECYSTECTOMY     • HYSTERECTOMY      total        Current Outpatient Medications:   •  albuterol sulfate  (90 Base) MCG/ACT inhaler, Inhale 2 puffs 4 (Four) Times a Day., Disp: 6.7 g, Rfl: 5  •  amLODIPine (NORVASC) 5 MG tablet, Take 10 mg by mouth Daily., Disp: , Rfl:   •  clobetasol (TEMOVATE) 0.05 % cream, Apply  topically to the appropriate area as directed 2 (Two) Times a Day., Disp: , Rfl:   •  clopidogrel (PLAVIX) 75 MG tablet, Take 75 mg by mouth Daily., Disp: , Rfl:   •  fluticasone-salmeterol (Advair Diskus) 250-50 MCG/DOSE DISKUS, Inhale 1 puff 2 (Two) Times a Day., Disp: 1 each, Rfl: 5  •  Hydrocortisone, Perianal, (ANUSOL-HC) 2.5 % rectal cream, Insert  into the rectum 2 (Two) Times a Day., Disp: , Rfl:   •  levothyroxine (SYNTHROID, LEVOTHROID) 88 MCG tablet, Take 88 mcg by mouth Daily., Disp: , Rfl:   •  metoprolol succinate XL (TOPROL-XL) 25 MG 24 hr tablet, Take 1 tablet by mouth Daily., Disp: 90 tablet, Rfl: 3  •  Multiple Vitamins-Minerals (PRESERVISION AREDS 2+MULTI VIT PO), Take 1 capsule by mouth Daily., Disp: , Rfl:   •  simvastatin (ZOCOR) 20 MG tablet, Take 40 mg by mouth Every Night., Disp: , Rfl:   •  betamethasone dipropionate (DIPROLENE) 0.05 % ointment, Apply 1 application topically to the appropriate area as directed Daily As Needed., Disp: , Rfl:   •  methylPREDNISolone (MEDROL) 4 MG dose pack, Take as directed on package instructions., Disp: 1 tablet, Rfl: 0      Vitals:    10/11/21 1329   BP: 136/74   Pulse: 74   Temp: 97.1 °F (36.2 °C)   SpO2: 98%         10/11/21  1329   Weight: 62.1 kg (137 lb)       Body mass index is 26.76 kg/m².    Physical Exam  Vitals and nursing note  reviewed.   Constitutional:       Appearance: She is well-developed.   HENT:      Head: Normocephalic and atraumatic.      Right Ear: External ear normal.      Left Ear: External ear normal.      Nose: Nose normal.   Eyes:      Conjunctiva/sclera: Conjunctivae normal.      Pupils: Pupils are equal, round, and reactive to light.   Neck:      Thyroid: No thyromegaly.   Cardiovascular:      Rate and Rhythm: Normal rate and regular rhythm.      Heart sounds: Normal heart sounds.   Pulmonary:      Effort: Pulmonary effort is normal.      Breath sounds: Normal breath sounds.   Abdominal:      General: Bowel sounds are normal.      Palpations: Abdomen is soft.   Musculoskeletal:      Cervical back: Normal, normal range of motion and neck supple.      Thoracic back: Normal.      Lumbar back: Spasms, tenderness and bony tenderness present. Decreased range of motion. Negative right straight leg raise test and negative left straight leg raise test.        Back:    Lymphadenopathy:      Cervical: No cervical adenopathy.   Skin:     General: Skin is warm and dry.   Neurological:      Mental Status: She is alert and oriented to person, place, and time.      Sensory: Sensory deficit present.      Motor: Motor function is intact.      Coordination: Coordination is intact.      Gait: Gait abnormal.      Comments: Antalgic gait related to pain down the left leg.  Reports numbness in foot worse when she sits prolonged.   Psychiatric:         Attention and Perception: Attention normal.         Mood and Affect: Mood normal.         Speech: Speech normal.         Behavior: Behavior normal.         Thought Content: Thought content normal.               Assessment/Plan   Diagnoses and all orders for this visit:    1. Need for influenza vaccination (Primary)  -     Fluzone High-Dose 65+yrs    2. Sciatic nerve pain, left  -     methylPREDNISolone (MEDROL) 4 MG dose pack; Take as directed on package instructions.  Dispense: 1 tablet; Refill:  0    3. Acute left-sided low back pain with left-sided sciatica  -     methylPREDNISolone (MEDROL) 4 MG dose pack; Take as directed on package instructions.  Dispense: 1 tablet; Refill: 0    Patient preferred oral steroid pack will start that today.  Also gave patient Voltaren gel, I applied the gel to her back region prior to leaving today.  Gave her samples of low-dose ibuprofen and Tylenol combo pills.  Advised her that she can take Tylenol as needed for pain relief every 6-8 hours.  No more than 3200 mg a day.  If pain does not improve advised her to call the office and I would send her for a left hip and lumbar x-ray.  Would be looking at her L5-S1 region and SI joint or bone spurring in her hip joint.  If this was seen would recommend physical therapy either from exercise I gave to try at home or a formal physical therapy evaluation and treatment for minimum of 6 weeks.

## 2021-10-11 NOTE — PATIENT INSTRUCTIONS
Sciatica Rehab  Ask your health care provider which exercises are safe for you. Do exercises exactly as told by your health care provider and adjust them as directed. It is normal to feel mild stretching, pulling, tightness, or discomfort as you do these exercises. Stop right away if you feel sudden pain or your pain gets worse. Do not begin these exercises until told by your health care provider.  Stretching and range-of-motion exercises  These exercises warm up your muscles and joints and improve the movement and flexibility of your hips and back. These exercises also help to relieve pain, numbness, and tingling.  Sciatic nerve glide  1. Sit in a chair with your head facing down toward your chest. Place your hands behind your back. Let your shoulders slump forward.  2. Slowly straighten one of your legs while you tilt your head back as if you are looking toward the ceiling. Only straighten your leg as far as you can without making your symptoms worse.  3. Hold this position for __________ seconds.  4. Slowly return to the starting position.  5. Repeat with your other leg.  Repeat __________ times. Complete this exercise __________ times a day.  Knee to chest with hip adduction and internal rotation    1. Lie on your back on a firm surface with both legs straight.  2. Bend one of your knees and move it up toward your chest until you feel a gentle stretch in your lower back and buttock. Then, move your knee toward the shoulder that is on the opposite side from your leg. This is hip adduction and internal rotation.  ? Hold your leg in this position by holding on to the front of your knee.  3. Hold this position for __________ seconds.  4. Slowly return to the starting position.  5. Repeat with your other leg.  Repeat __________ times. Complete this exercise __________ times a day.  Prone extension on elbows    1. Lie on your abdomen on a firm surface. A bed may be too soft for this exercise.  2. Prop yourself up on  your elbows.  3. Use your arms to help lift your chest up until you feel a gentle stretch in your abdomen and your lower back.  ? This will place some of your body weight on your elbows. If this is uncomfortable, try stacking pillows under your chest.  ? Your hips should stay down, against the surface that you are lying on. Keep your hip and back muscles relaxed.  4. Hold this position for __________ seconds.  5. Slowly relax your upper body and return to the starting position.  Repeat __________ times. Complete this exercise __________ times a day.  Strengthening exercises  These exercises build strength and endurance in your back. Endurance is the ability to use your muscles for a long time, even after they get tired.  Pelvic tilt  This exercise strengthens the muscles that lie deep in the abdomen.  1. Lie on your back on a firm surface. Bend your knees and keep your feet flat on the floor.  2. Tense your abdominal muscles. Tip your pelvis up toward the ceiling and flatten your lower back into the floor.  ? To help with this exercise, you may place a small towel under your lower back and try to push your back into the towel.  3. Hold this position for __________ seconds.  4. Let your muscles relax completely before you repeat this exercise.  Repeat __________ times. Complete this exercise __________ times a day.  Alternating arm and leg raises    1. Get on your hands and knees on a firm surface. If you are on a hard floor, you may want to use padding, such as an exercise mat, to cushion your knees.  2. Line up your arms and legs. Your hands should be directly below your shoulders, and your knees should be directly below your hips.  3. Lift your left leg behind you. At the same time, raise your right arm and straighten it in front of you.  ? Do not lift your leg higher than your hip.  ? Do not lift your arm higher than your shoulder.  ? Keep your abdominal and back muscles tight.  ? Keep your hips facing the  ground.  ? Do not arch your back.  ? Keep your balance carefully, and do not hold your breath.  4. Hold this position for __________ seconds.  5. Slowly return to the starting position.  6. Repeat with your right leg and your left arm.  Repeat __________ times. Complete this exercise __________ times a day.  Posture and body mechanics  Good posture and healthy body mechanics can help to relieve stress in your body's tissues and joints. Body mechanics refers to the movements and positions of your body while you do your daily activities. Posture is part of body mechanics. Good posture means:  · Your spine is in its natural S-curve position (neutral).  · Your shoulders are pulled back slightly.  · Your head is not tipped forward.  Follow these guidelines to improve your posture and body mechanics in your everyday activities.  Standing    · When standing, keep your spine neutral and your feet about hip width apart. Keep a slight bend in your knees. Your ears, shoulders, and hips should line up.  · When you do a task in which you  one place for a long time, place one foot up on a stable object that is 2-4 inches (5-10 cm) high, such as a footstool. This helps keep your spine neutral.    Sitting    · When sitting, keep your spine neutral and keep your feet flat on the floor. Use a footrest, if necessary, and keep your thighs parallel to the floor. Avoid rounding your shoulders, and avoid tilting your head forward.  · When working at a desk or a computer, keep your desk at a height where your hands are slightly lower than your elbows. Slide your chair under your desk so you are close enough to maintain good posture.  · When working at a computer, place your monitor at a height where you are looking straight ahead and you do not have to tilt your head forward or downward to look at the screen.    Resting  · When lying down and resting, avoid positions that are most painful for you.  · If you have pain with  activities such as sitting, bending, stooping, or squatting, lie in a position in which your body does not bend very much. For example, avoid curling up on your side with your arms and knees near your chest (fetal position).  · If you have pain with activities such as standing for a long time or reaching with your arms, lie with your spine in a neutral position and bend your knees slightly. Try the following positions:  ? Lying on your side with a pillow between your knees.  ? Lying on your back with a pillow under your knees.  Lifting    · When lifting objects, keep your feet at least shoulder width apart and tighten your abdominal muscles.  · Bend your knees and hips and keep your spine neutral. It is important to lift using the strength of your legs, not your back. Do not lock your knees straight out.  · Always ask for help to lift heavy or awkward objects.    This information is not intended to replace advice given to you by your health care provider. Make sure you discuss any questions you have with your health care provider.  Document Revised: 04/10/2020 Document Reviewed: 01/09/2020  Elsevier Patient Education © 2021 [employer]  Acute Back Pain, Adult  Acute back pain is sudden and usually short-lived. It is often caused by an injury to the muscles and tissues in the back. The injury may result from:  · A muscle or ligament getting overstretched or torn (strained). Ligaments are tissues that connect bones to each other. Lifting something improperly can cause a back strain.  · Wear and tear (degeneration) of the spinal disks. Spinal disks are circular tissue that provide cushioning between the bones of the spine (vertebrae).  · Twisting motions, such as while playing sports or doing yard work.  · A hit to the back.  · Arthritis.  You may have a physical exam, lab tests, and imaging tests to find the cause of your pain. Acute back pain usually goes away with rest and home care.  Follow these instructions at  home:  Managing pain, stiffness, and swelling  · Treatment may include medicines for pain and inflammation that are taken by mouth or applied to the skin, prescription pain medicine, or muscle relaxants. Take over-the-counter and prescription medicines only as told by your health care provider.  · Your health care provider may recommend applying ice during the first 24-48 hours after your pain starts. To do this:  ? Put ice in a plastic bag.  ? Place a towel between your skin and the bag.  ? Leave the ice on for 20 minutes, 2-3 times a day.  · If directed, apply heat to the affected area as often as told by your health care provider. Use the heat source that your health care provider recommends, such as a moist heat pack or a heating pad.  ? Place a towel between your skin and the heat source.  ? Leave the heat on for 20-30 minutes.  ? Remove the heat if your skin turns bright red. This is especially important if you are unable to feel pain, heat, or cold. You have a greater risk of getting burned.  Activity    · Do not stay in bed. Staying in bed for more than 1-2 days can delay your recovery.  · Sit up and stand up straight. Avoid leaning forward when you sit or hunching over when you stand.  ? If you work at a desk, sit close to it so you do not need to lean over. Keep your chin tucked in. Keep your neck drawn back, and keep your elbows bent at a 90-degree angle (right angle).  ? Sit high and close to the steering wheel when you drive. Add lower back (lumbar) support to your car seat, if needed.  · Take short walks on even surfaces as soon as you are able. Try to increase the length of time you walk each day.  · Do not sit, drive, or  one place for more than 30 minutes at a time. Sitting or standing for long periods of time can put stress on your back.  · Do not drive or use heavy machinery while taking prescription pain medicine.  · Use proper lifting techniques. When you bend and lift, use positions  that put less stress on your back:  ? Bend your knees.  ? Keep the load close to your body.  ? Avoid twisting.  · Exercise regularly as told by your health care provider. Exercising helps your back heal faster and helps prevent back injuries by keeping muscles strong and flexible.  · Work with a physical therapist to make a safe exercise program, as recommended by your health care provider. Do any exercises as told by your physical therapist.    Lifestyle  · Maintain a healthy weight. Extra weight puts stress on your back and makes it difficult to have good posture.  · Avoid activities or situations that make you feel anxious or stressed. Stress and anxiety increase muscle tension and can make back pain worse. Learn ways to manage anxiety and stress, such as through exercise.  General instructions  · Sleep on a firm mattress in a comfortable position. Try lying on your side with your knees slightly bent. If you lie on your back, put a pillow under your knees.  · Follow your treatment plan as told by your health care provider. This may include:  ? Cognitive or behavioral therapy.  ? Acupuncture or massage therapy.  ? Meditation or yoga.  Contact a health care provider if:  · You have pain that is not relieved with rest or medicine.  · You have increasing pain going down into your legs or buttocks.  · Your pain does not improve after 2 weeks.  · You have pain at night.  · You lose weight without trying.  · You have a fever or chills.  Get help right away if:  · You develop new bowel or bladder control problems.  · You have unusual weakness or numbness in your arms or legs.  · You develop nausea or vomiting.  · You develop abdominal pain.  · You feel faint.  Summary  · Acute back pain is sudden and usually short-lived.  · Use proper lifting techniques. When you bend and lift, use positions that put less stress on your back.  · Take over-the-counter and prescription medicines and apply heat or ice as directed by your  health care provider.  This information is not intended to replace advice given to you by your health care provider. Make sure you discuss any questions you have with your health care provider.  Document Revised: 10/15/2020 Document Reviewed: 08/01/2018  Elsevier Patient Education © 2021 [employer]

## 2021-10-13 ENCOUNTER — OFFICE VISIT (OUTPATIENT)
Dept: INTERNAL MEDICINE | Facility: CLINIC | Age: 86
End: 2021-10-13

## 2021-10-13 VITALS
TEMPERATURE: 97.8 F | HEART RATE: 68 BPM | BODY MASS INDEX: 26.11 KG/M2 | WEIGHT: 133 LBS | HEIGHT: 60 IN | DIASTOLIC BLOOD PRESSURE: 68 MMHG | SYSTOLIC BLOOD PRESSURE: 150 MMHG | OXYGEN SATURATION: 100 %

## 2021-10-13 DIAGNOSIS — N17.0 ACUTE RENAL FAILURE WITH TUBULAR NECROSIS (HCC): ICD-10-CM

## 2021-10-13 DIAGNOSIS — R31.9 URINARY TRACT INFECTION WITH HEMATURIA, SITE UNSPECIFIED: ICD-10-CM

## 2021-10-13 DIAGNOSIS — N39.0 URINARY TRACT INFECTION WITH HEMATURIA, SITE UNSPECIFIED: ICD-10-CM

## 2021-10-13 DIAGNOSIS — M54.17 LUMBOSACRAL RADICULOPATHY AT L4: Primary | ICD-10-CM

## 2021-10-13 LAB
BILIRUB BLD-MCNC: NEGATIVE MG/DL
CLARITY, POC: ABNORMAL
COLOR UR: YELLOW
GLUCOSE UR STRIP-MCNC: NEGATIVE MG/DL
KETONES UR QL: NEGATIVE
LEUKOCYTE EST, POC: ABNORMAL
NITRITE UR-MCNC: NEGATIVE MG/ML
PH UR: 6.5 [PH] (ref 5–8)
PROT UR STRIP-MCNC: ABNORMAL MG/DL
RBC # UR STRIP: ABNORMAL /UL
SP GR UR: 1.02 (ref 1–1.03)
UROBILINOGEN UR QL: NORMAL

## 2021-10-13 PROCEDURE — 81003 URINALYSIS AUTO W/O SCOPE: CPT | Performed by: INTERNAL MEDICINE

## 2021-10-13 PROCEDURE — 99214 OFFICE O/P EST MOD 30 MIN: CPT | Performed by: INTERNAL MEDICINE

## 2021-10-13 RX ORDER — CEPHALEXIN 500 MG/1
500 CAPSULE ORAL 2 TIMES DAILY
Qty: 14 CAPSULE | Refills: 0 | Status: SHIPPED | OUTPATIENT
Start: 2021-10-13 | End: 2021-10-24 | Stop reason: HOSPADM

## 2021-10-13 RX ORDER — HYDROCODONE BITARTRATE AND ACETAMINOPHEN 5; 325 MG/1; MG/1
1 TABLET ORAL EVERY 6 HOURS PRN
Qty: 20 TABLET | Refills: 0 | Status: SHIPPED | OUTPATIENT
Start: 2021-10-13 | End: 2022-04-01

## 2021-10-13 RX ORDER — NITROFURANTOIN 25; 75 MG/1; MG/1
100 CAPSULE ORAL 2 TIMES DAILY
Qty: 14 CAPSULE | Refills: 0 | Status: CANCELLED | OUTPATIENT
Start: 2021-10-13

## 2021-10-13 NOTE — PROGRESS NOTES
Subjective   Alka Nicolas is a 86 y.o. female.   Chief Complaint   Patient presents with   • Follow-up     follow up from 09/20/2021    • discuss medication     potassium, HCTZ, Norvasc   • Hip Pain     left hip pain; saw Daisy on 10/11/2021; is keeping her from sleeping, nothing seems to ease the pain        History of Present Illness patient has had some mild urinary symptoms.  She is also complaining of left hip pain tenderness lower back pain radiating down her leg.  She is unaware of anything she is done that caused an injury.  She also has some concerns about having stopped several medications last visit.    The following portions of the patient's history were reviewed and updated as appropriate: allergies, current medications, past family history, past medical history, past social history, past surgical history and problem list.    Review of Systems   Constitutional: Negative for activity change, appetite change, fatigue, fever, unexpected weight gain and unexpected weight loss.   HENT: Negative for swollen glands, trouble swallowing and voice change.    Eyes: Negative for blurred vision and visual disturbance.   Respiratory: Negative for cough and shortness of breath.    Cardiovascular: Negative for chest pain, palpitations and leg swelling.   Gastrointestinal: Negative for abdominal pain, constipation, diarrhea, nausea, vomiting and indigestion.   Endocrine: Negative for cold intolerance, heat intolerance, polydipsia and polyphagia.   Genitourinary: Negative for dysuria and frequency.   Musculoskeletal: Positive for arthralgias and back pain. Negative for joint swelling and neck pain.   Skin: Negative for color change, rash and skin lesions.   Neurological: Negative for dizziness, weakness, headache, memory problem and confusion.   Hematological: Does not bruise/bleed easily.   Psychiatric/Behavioral: Negative for agitation, hallucinations and suicidal ideas. The patient is not nervous/anxious.         Objective   Past Medical History:   Diagnosis Date   • Abdominal aortic aneurysm (HCC)    • Angina pectoris (HCC)    • Atherosclerosis of native coronary artery of native heart without angina pectoris    • CAD (coronary artery disease)    • Esophageal reflux    • GERD (gastroesophageal reflux disease)    • History of PTCA    • Hyperlipidemia    • Hypertension    • Hypertension, essential, benign    • Tobacco use disorder       Past Surgical History:   Procedure Laterality Date   • APPENDECTOMY     • CARDIAC CATHETERIZATION     • CARDIAC CATHETERIZATION Left 10/21/2020    Procedure: Cardiac Catheterization/Vascular Study;  Surgeon: Marcos Ratliff MD;  Location:  [facility];  Service: Cardiology;  Laterality: Left;   • CHOLECYSTECTOMY     • HYSTERECTOMY      total        Current Outpatient Medications:   •  albuterol sulfate  (90 Base) MCG/ACT inhaler, Inhale 2 puffs 4 (Four) Times a Day., Disp: 6.7 g, Rfl: 5  •  betamethasone dipropionate (DIPROLENE) 0.05 % ointment, Apply 1 application topically to the appropriate area as directed Daily As Needed., Disp: , Rfl:   •  clobetasol (TEMOVATE) 0.05 % cream, Apply  topically to the appropriate area as directed 2 (Two) Times a Day., Disp: , Rfl:   •  clopidogrel (PLAVIX) 75 MG tablet, Take 75 mg by mouth Daily., Disp: , Rfl:   •  fluticasone-salmeterol (Advair Diskus) 250-50 MCG/DOSE DISKUS, Inhale 1 puff 2 (Two) Times a Day., Disp: 1 each, Rfl: 5  •  Hydrocortisone, Perianal, (ANUSOL-HC) 2.5 % rectal cream, Insert  into the rectum 2 (Two) Times a Day., Disp: , Rfl:   •  levothyroxine (SYNTHROID, LEVOTHROID) 88 MCG tablet, Take 88 mcg by mouth Daily., Disp: , Rfl:   •  methylPREDNISolone (MEDROL) 4 MG dose pack, Take as directed on package instructions., Disp: 1 tablet, Rfl: 0  •  metoprolol succinate XL (TOPROL-XL) 25 MG 24 hr tablet, Take 1 tablet by mouth Daily., Disp: 90 tablet, Rfl: 3  •  Multiple Vitamins-Minerals (PRESERVISION  AREDS 2+MULTI VIT PO), Take 1 capsule by mouth Daily., Disp: , Rfl:   •  simvastatin (ZOCOR) 20 MG tablet, Take 40 mg by mouth Every Night., Disp: , Rfl:   •  cephalexin (Keflex) 500 MG capsule, Take 1 capsule by mouth 2 (Two) Times a Day., Disp: 14 capsule, Rfl: 0  •  HYDROcodone-acetaminophen (NORCO) 5-325 MG per tablet, Take 1 tablet by mouth Every 6 (Six) Hours As Needed for Severe Pain ., Disp: 20 tablet, Rfl: 0      Vitals:    10/13/21 1047   BP: 150/68   Pulse: 68   Temp: 97.8 °F (36.6 °C)   SpO2: 100%         10/13/21  1047   Weight: 60.3 kg (133 lb)       Body mass index is 25.97 kg/m².    Physical Exam  Constitutional:       Appearance: She is well-developed.   HENT:      Head: Normocephalic and atraumatic.   Eyes:      Pupils: Pupils are equal, round, and reactive to light.   Cardiovascular:      Rate and Rhythm: Normal rate and regular rhythm.   Pulmonary:      Effort: Pulmonary effort is normal.      Breath sounds: Normal breath sounds.   Abdominal:      General: Bowel sounds are normal.      Palpations: Abdomen is soft.   Musculoskeletal:         General: Tenderness ( Very tender left greater trochanter lower back and left lateral thigh) present. Normal range of motion.      Cervical back: Normal range of motion and neck supple.   Skin:     General: Skin is warm and dry.   Neurological:      Mental Status: She is alert and oriented to person, place, and time.   Psychiatric:         Behavior: Behavior normal.               Assessment/Plan   Diagnoses and all orders for this visit:    1. Lumbosacral radiculopathy at L4 (Primary)  -     XR Spine Lumbar 4+ View; Future  -     XR Hip With or Without Pelvis 2 - 3 View Left; Future  -     HYDROcodone-acetaminophen (NORCO) 5-325 MG per tablet; Take 1 tablet by mouth Every 6 (Six) Hours As Needed for Severe Pain .  Dispense: 20 tablet; Refill: 0    2. Acute renal failure with tubular necrosis (HCC)  -     Basic Metabolic Panel; Future    3. Urinary tract  infection with hematuria, site unspecified  -     POC Urinalysis Dipstick, Multipro  -     Urine Culture - Urine, Urine, Clean Catch    Other orders  -     cephalexin (Keflex) 500 MG capsule; Take 1 capsule by mouth 2 (Two) Times a Day.  Dispense: 14 capsule; Refill: 0      At today's visit patient has radicular pain in about the L4 distribution left leg she also has tenderness in the greater trochanter I am not exactly sure what is causing this discomfort we will going to go ahead get some films of her lower back and her hip.  There was a fall at some point patient denies this but her daughter states that the patient told her that she did fall she has some minimal urinary symptoms however she had a recent UTI and I want a recheck and make sure that is not an issue.  In regard to her renal function it is improved slightly I have encouraged continued hydration we will leave her off of the additional medications that she was taken off of last visit.  We will also allow her small dose of Norco temporary to get her through this pain.

## 2021-10-17 LAB
BACTERIA UR CULT: ABNORMAL
BACTERIA UR CULT: ABNORMAL
OTHER ANTIBIOTIC SUSC ISLT: ABNORMAL

## 2021-10-18 ENCOUNTER — HOSPITAL ENCOUNTER (OUTPATIENT)
Dept: GENERAL RADIOLOGY | Facility: HOSPITAL | Age: 86
Discharge: HOME | End: 2021-10-18
Admitting: INTERNAL MEDICINE

## 2021-10-18 DIAGNOSIS — M54.17 LUMBOSACRAL RADICULOPATHY AT L4: ICD-10-CM

## 2021-10-18 PROCEDURE — 72110 X-RAY EXAM L-2 SPINE 4/>VWS: CPT

## 2021-10-18 PROCEDURE — 73502 X-RAY EXAM HIP UNI 2-3 VIEWS: CPT

## 2021-10-20 ENCOUNTER — TELEPHONE (OUTPATIENT)
Dept: INTERNAL MEDICINE | Facility: CLINIC | Age: 86
End: 2021-10-20

## 2021-10-20 NOTE — TELEPHONE ENCOUNTER
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

## 2021-10-22 ENCOUNTER — APPOINTMENT (OUTPATIENT)
Dept: GENERAL RADIOLOGY | Facility: HOSPITAL | Age: 86
End: 2021-10-22

## 2021-10-22 ENCOUNTER — HOSPITAL ENCOUNTER (INPATIENT)
Facility: HOSPITAL | Age: 86
LOS: 2 days | Discharge: HOME | End: 2021-10-24
Attending: INTERNAL MEDICINE | Admitting: INTERNAL MEDICINE

## 2021-10-22 ENCOUNTER — OFFICE VISIT (OUTPATIENT)
Dept: INTERNAL MEDICINE | Facility: CLINIC | Age: 86
End: 2021-10-22

## 2021-10-22 VITALS
HEART RATE: 67 BPM | WEIGHT: 126 LBS | OXYGEN SATURATION: 99 % | DIASTOLIC BLOOD PRESSURE: 68 MMHG | SYSTOLIC BLOOD PRESSURE: 110 MMHG | BODY MASS INDEX: 24.74 KG/M2 | HEIGHT: 60 IN | TEMPERATURE: 97.1 F

## 2021-10-22 DIAGNOSIS — E87.6 HYPOKALEMIA: ICD-10-CM

## 2021-10-22 DIAGNOSIS — I48.91 ATRIAL FIBRILLATION WITH RVR (HCC): Primary | ICD-10-CM

## 2021-10-22 DIAGNOSIS — I48.91 ATRIAL FIBRILLATION WITH RAPID VENTRICULAR RESPONSE (HCC): Primary | ICD-10-CM

## 2021-10-22 PROBLEM — N18.4 CHRONIC KIDNEY DISEASE, STAGE IV (SEVERE) (HCC): Status: ACTIVE | Noted: 2021-10-22

## 2021-10-22 PROBLEM — J18.9 PNEUMONITIS: Status: ACTIVE | Noted: 2021-10-22

## 2021-10-22 PROBLEM — R77.8 ELEVATED TROPONIN: Status: ACTIVE | Noted: 2021-10-22

## 2021-10-22 PROBLEM — R62.7 FAILURE TO THRIVE IN ADULT: Status: ACTIVE | Noted: 2021-10-22

## 2021-10-22 PROBLEM — J98.4 PNEUMONITIS: Status: ACTIVE | Noted: 2021-10-22

## 2021-10-22 PROBLEM — R79.89 ELEVATED TROPONIN: Status: ACTIVE | Noted: 2021-10-22

## 2021-10-22 LAB
ALBUMIN SERPL-MCNC: 4.2 G/DL (ref 3.5–5.2)
ALBUMIN/GLOB SERPL: 1.6 G/DL
ALP SERPL-CCNC: 157 U/L (ref 39–117)
ALT SERPL W P-5'-P-CCNC: 12 U/L (ref 1–33)
ANION GAP SERPL CALCULATED.3IONS-SCNC: 16 MMOL/L (ref 5–15)
APTT PPP: 28.1 SECONDS (ref 24.1–35)
AST SERPL-CCNC: 13 U/L (ref 1–32)
BASOPHILS # BLD AUTO: 0.03 10*3/MM3 (ref 0–0.2)
BASOPHILS NFR BLD AUTO: 0.3 % (ref 0–1.5)
BILIRUB SERPL-MCNC: 0.7 MG/DL (ref 0–1.2)
BUN SERPL-MCNC: 33 MG/DL (ref 8–23)
BUN/CREAT SERPL: 17.4 (ref 7–25)
CALCIUM SPEC-SCNC: 9 MG/DL (ref 8.6–10.5)
CHLORIDE SERPL-SCNC: 113 MMOL/L (ref 98–107)
CO2 SERPL-SCNC: 12 MMOL/L (ref 22–29)
CREAT SERPL-MCNC: 1.9 MG/DL (ref 0.57–1)
D DIMER PPP FEU-MCNC: 0.5 MG/L (FEU) (ref 0–0.5)
DEPRECATED RDW RBC AUTO: 47.7 FL (ref 37–54)
EOSINOPHIL # BLD AUTO: 0.16 10*3/MM3 (ref 0–0.4)
EOSINOPHIL NFR BLD AUTO: 1.4 % (ref 0.3–6.2)
ERYTHROCYTE [DISTWIDTH] IN BLOOD BY AUTOMATED COUNT: 15.3 % (ref 12.3–15.4)
GFR SERPL CREATININE-BSD FRML MDRD: 25 ML/MIN/1.73
GLOBULIN UR ELPH-MCNC: 2.6 GM/DL
GLUCOSE SERPL-MCNC: 154 MG/DL (ref 65–99)
HCT VFR BLD AUTO: 38.5 % (ref 34–46.6)
HGB BLD-MCNC: 13.1 G/DL (ref 12–15.9)
HOLD SPECIMEN: NORMAL
IMM GRANULOCYTES # BLD AUTO: 0.09 10*3/MM3 (ref 0–0.05)
IMM GRANULOCYTES NFR BLD AUTO: 0.8 % (ref 0–0.5)
INR PPP: 1.31 (ref 0.91–1.09)
LYMPHOCYTES # BLD AUTO: 1.52 10*3/MM3 (ref 0.7–3.1)
LYMPHOCYTES NFR BLD AUTO: 12.9 % (ref 19.6–45.3)
MAGNESIUM SERPL-MCNC: 2.2 MG/DL (ref 1.6–2.4)
MCH RBC QN AUTO: 29.4 PG (ref 26.6–33)
MCHC RBC AUTO-ENTMCNC: 34 G/DL (ref 31.5–35.7)
MCV RBC AUTO: 86.3 FL (ref 79–97)
MONOCYTES # BLD AUTO: 0.89 10*3/MM3 (ref 0.1–0.9)
MONOCYTES NFR BLD AUTO: 7.6 % (ref 5–12)
NEUTROPHILS NFR BLD AUTO: 77 % (ref 42.7–76)
NEUTROPHILS NFR BLD AUTO: 9.09 10*3/MM3 (ref 1.7–7)
NRBC BLD AUTO-RTO: 0 /100 WBC (ref 0–0.2)
PLATELET # BLD AUTO: 186 10*3/MM3 (ref 140–450)
PMV BLD AUTO: 10.7 FL (ref 6–12)
POTASSIUM SERPL-SCNC: 2.6 MMOL/L (ref 3.5–5.2)
PROT SERPL-MCNC: 6.8 G/DL (ref 6–8.5)
PROTHROMBIN TIME: 15.8 SECONDS (ref 11.9–14.6)
RBC # BLD AUTO: 4.46 10*6/MM3 (ref 3.77–5.28)
SARS-COV-2 RNA PNL SPEC NAA+PROBE: NOT DETECTED
SODIUM SERPL-SCNC: 141 MMOL/L (ref 136–145)
T4 FREE SERPL-MCNC: 1.49 NG/DL (ref 0.93–1.7)
TROPONIN T SERPL-MCNC: 0.04 NG/ML (ref 0–0.03)
TSH SERPL DL<=0.05 MIU/L-ACNC: 0.96 UIU/ML (ref 0.27–4.2)
WBC # BLD AUTO: 11.78 10*3/MM3 (ref 3.4–10.8)
WHOLE BLOOD HOLD SPECIMEN: NORMAL
WHOLE BLOOD HOLD SPECIMEN: NORMAL

## 2021-10-22 PROCEDURE — 84443 ASSAY THYROID STIM HORMONE: CPT | Performed by: NURSE PRACTITIONER

## 2021-10-22 PROCEDURE — 25010000002 SODIUM CHLORIDE 0.9 % WITH KCL 20 MEQ 20-0.9 MEQ/L-% SOLUTION: Performed by: NURSE PRACTITIONER

## 2021-10-22 PROCEDURE — 84439 ASSAY OF FREE THYROXINE: CPT | Performed by: NURSE PRACTITIONER

## 2021-10-22 PROCEDURE — 85025 COMPLETE CBC W/AUTO DIFF WBC: CPT | Performed by: NURSE PRACTITIONER

## 2021-10-22 PROCEDURE — 94799 UNLISTED PULMONARY SVC/PX: CPT

## 2021-10-22 PROCEDURE — 25010000002 POTASSIUM CHLORIDE PER 2 MEQ: Performed by: NURSE PRACTITIONER

## 2021-10-22 PROCEDURE — 80053 COMPREHEN METABOLIC PANEL: CPT | Performed by: NURSE PRACTITIONER

## 2021-10-22 PROCEDURE — 93010 ELECTROCARDIOGRAM REPORT: CPT | Performed by: INTERNAL MEDICINE

## 2021-10-22 PROCEDURE — 87635 SARS-COV-2 COVID-19 AMP PRB: CPT | Performed by: NURSE PRACTITIONER

## 2021-10-22 PROCEDURE — 71045 X-RAY EXAM CHEST 1 VIEW: CPT

## 2021-10-22 PROCEDURE — 99284 EMERGENCY DEPT VISIT MOD MDM: CPT

## 2021-10-22 PROCEDURE — 93005 ELECTROCARDIOGRAM TRACING: CPT

## 2021-10-22 PROCEDURE — 99214 OFFICE O/P EST MOD 30 MIN: CPT | Performed by: NURSE PRACTITIONER

## 2021-10-22 PROCEDURE — 83735 ASSAY OF MAGNESIUM: CPT | Performed by: NURSE PRACTITIONER

## 2021-10-22 PROCEDURE — 85610 PROTHROMBIN TIME: CPT | Performed by: NURSE PRACTITIONER

## 2021-10-22 PROCEDURE — 94640 AIRWAY INHALATION TREATMENT: CPT

## 2021-10-22 PROCEDURE — 85379 FIBRIN DEGRADATION QUANT: CPT | Performed by: NURSE PRACTITIONER

## 2021-10-22 PROCEDURE — 93000 ELECTROCARDIOGRAM COMPLETE: CPT | Performed by: NURSE PRACTITIONER

## 2021-10-22 PROCEDURE — 85730 THROMBOPLASTIN TIME PARTIAL: CPT | Performed by: NURSE PRACTITIONER

## 2021-10-22 PROCEDURE — 84484 ASSAY OF TROPONIN QUANT: CPT | Performed by: NURSE PRACTITIONER

## 2021-10-22 RX ORDER — PREDNISONE 20 MG/1
20 TABLET ORAL
Status: DISCONTINUED | OUTPATIENT
Start: 2021-10-23 | End: 2021-10-24 | Stop reason: HOSPADM

## 2021-10-22 RX ORDER — ONDANSETRON 2 MG/ML
4 INJECTION INTRAMUSCULAR; INTRAVENOUS EVERY 6 HOURS PRN
Status: DISCONTINUED | OUTPATIENT
Start: 2021-10-22 | End: 2021-10-24 | Stop reason: HOSPADM

## 2021-10-22 RX ORDER — ONDANSETRON 4 MG/1
4 TABLET, FILM COATED ORAL EVERY 6 HOURS PRN
Status: DISCONTINUED | OUTPATIENT
Start: 2021-10-22 | End: 2021-10-24 | Stop reason: HOSPADM

## 2021-10-22 RX ORDER — GUAIFENESIN 600 MG/1
1200 TABLET, EXTENDED RELEASE ORAL EVERY 12 HOURS SCHEDULED
Status: DISCONTINUED | OUTPATIENT
Start: 2021-10-22 | End: 2021-10-24 | Stop reason: HOSPADM

## 2021-10-22 RX ORDER — POTASSIUM CHLORIDE 14.9 MG/ML
20 INJECTION INTRAVENOUS ONCE
Status: COMPLETED | OUTPATIENT
Start: 2021-10-22 | End: 2021-10-22

## 2021-10-22 RX ORDER — SODIUM CHLORIDE AND POTASSIUM CHLORIDE 150; 900 MG/100ML; MG/100ML
75 INJECTION, SOLUTION INTRAVENOUS CONTINUOUS
Status: DISCONTINUED | OUTPATIENT
Start: 2021-10-22 | End: 2021-10-23

## 2021-10-22 RX ORDER — POTASSIUM CHLORIDE 1.5 G/1.77G
40 POWDER, FOR SOLUTION ORAL ONCE
Status: COMPLETED | OUTPATIENT
Start: 2021-10-22 | End: 2021-10-22

## 2021-10-22 RX ORDER — SODIUM CHLORIDE 0.9 % (FLUSH) 0.9 %
10 SYRINGE (ML) INJECTION EVERY 12 HOURS SCHEDULED
Status: DISCONTINUED | OUTPATIENT
Start: 2021-10-22 | End: 2021-10-24 | Stop reason: HOSPADM

## 2021-10-22 RX ORDER — SODIUM CHLORIDE 0.9 % (FLUSH) 0.9 %
10 SYRINGE (ML) INJECTION AS NEEDED
Status: DISCONTINUED | OUTPATIENT
Start: 2021-10-22 | End: 2021-10-24 | Stop reason: HOSPADM

## 2021-10-22 RX ORDER — POTASSIUM CHLORIDE 750 MG/1
40 CAPSULE, EXTENDED RELEASE ORAL
Status: COMPLETED | OUTPATIENT
Start: 2021-10-22 | End: 2021-10-22

## 2021-10-22 RX ORDER — ACETAMINOPHEN 325 MG/1
650 TABLET ORAL EVERY 4 HOURS PRN
Status: DISCONTINUED | OUTPATIENT
Start: 2021-10-22 | End: 2021-10-24 | Stop reason: HOSPADM

## 2021-10-22 RX ORDER — DILTIAZEM HYDROCHLORIDE 5 MG/ML
10 INJECTION INTRAVENOUS ONCE
Status: COMPLETED | OUTPATIENT
Start: 2021-10-22 | End: 2021-10-22

## 2021-10-22 RX ORDER — ACETAMINOPHEN 650 MG/1
650 SUPPOSITORY RECTAL EVERY 4 HOURS PRN
Status: DISCONTINUED | OUTPATIENT
Start: 2021-10-22 | End: 2021-10-24 | Stop reason: HOSPADM

## 2021-10-22 RX ORDER — IPRATROPIUM BROMIDE AND ALBUTEROL SULFATE 2.5; .5 MG/3ML; MG/3ML
3 SOLUTION RESPIRATORY (INHALATION)
Status: DISCONTINUED | OUTPATIENT
Start: 2021-10-22 | End: 2021-10-24 | Stop reason: HOSPADM

## 2021-10-22 RX ORDER — ACETAMINOPHEN 160 MG/5ML
650 SOLUTION ORAL EVERY 4 HOURS PRN
Status: DISCONTINUED | OUTPATIENT
Start: 2021-10-22 | End: 2021-10-24 | Stop reason: HOSPADM

## 2021-10-22 RX ADMIN — DILTIAZEM HYDROCHLORIDE 10 MG: 5 INJECTION, SOLUTION INTRAVENOUS at 17:35

## 2021-10-22 RX ADMIN — POTASSIUM CHLORIDE 40 MEQ: 1.5 POWDER, FOR SOLUTION ORAL at 17:37

## 2021-10-22 RX ADMIN — POTASSIUM CHLORIDE AND SODIUM CHLORIDE 75 ML/HR: 900; 150 INJECTION, SOLUTION INTRAVENOUS at 22:45

## 2021-10-22 RX ADMIN — SODIUM CHLORIDE 500 ML: 9 INJECTION, SOLUTION INTRAVENOUS at 17:36

## 2021-10-22 RX ADMIN — GUAIFENESIN 1200 MG: 600 TABLET, EXTENDED RELEASE ORAL at 22:44

## 2021-10-22 RX ADMIN — IPRATROPIUM BROMIDE AND ALBUTEROL SULFATE 3 ML: 2.5; .5 SOLUTION RESPIRATORY (INHALATION) at 22:08

## 2021-10-22 RX ADMIN — Medication 10 ML: at 22:44

## 2021-10-22 RX ADMIN — POTASSIUM CHLORIDE 40 MEQ: 10 CAPSULE, COATED, EXTENDED RELEASE ORAL at 22:44

## 2021-10-22 RX ADMIN — POTASSIUM CHLORIDE 20 MEQ: 14.9 INJECTION, SOLUTION INTRAVENOUS at 17:36

## 2021-10-22 NOTE — PROGRESS NOTES
Subjective   Alka Nicolas is a 86 y.o. female.   Chief Complaint   Patient presents with   • Dehydrated     not eating/drinking, confusion, recent hx of UTI, SOB, nasal congestion, loss of taste       Alka presents today with her daughter for complaints of lethargy and possible dehydration.  She has had recent issues hypokalemia and acute kidney injury for which she was hospitalized in September for.  She has also had recent UTI treatment which she completed.  She denies urinary symptoms today.  Her daughter states over the past 6 days she has been unable to get her to eat or drink.  The patient seems confused and has developed some congestion and loss of taste and smell.  Her daughter states she did a rapid home COVID-19 test yesterday which was negative.  Her daughter states she put some home O2 on her last night but states O2 sats have remained 95-97%.  Alka denies chest pain.         The following portions of the patient's history were reviewed and updated as appropriate: allergies, current medications, past family history, past medical history, past social history, past surgical history and problem list.    Review of Systems   Constitutional: Positive for appetite change. Negative for activity change, fatigue, fever, unexpected weight gain and unexpected weight loss.   HENT: Positive for congestion. Negative for swollen glands, trouble swallowing and voice change.         Lost of taste and smell   Eyes: Negative for blurred vision and visual disturbance.   Respiratory: Positive for shortness of breath. Negative for cough.    Cardiovascular: Negative for chest pain, palpitations and leg swelling.   Gastrointestinal: Negative for abdominal pain, constipation, diarrhea, nausea, vomiting and indigestion.   Endocrine: Negative for cold intolerance, heat intolerance, polydipsia and polyphagia.   Genitourinary: Negative for dysuria and frequency.   Musculoskeletal: Negative for arthralgias, back pain,  joint swelling and neck pain.   Skin: Negative for color change, rash and skin lesions.   Neurological: Positive for confusion. Negative for dizziness, weakness, headache and memory problem.   Hematological: Does not bruise/bleed easily.   Psychiatric/Behavioral: Negative for agitation, hallucinations and suicidal ideas. The patient is not nervous/anxious.        Objective   Past Medical History:   Diagnosis Date   • Abdominal aortic aneurysm (HCC)    • Angina pectoris (HCC)    • Atherosclerosis of native coronary artery of native heart without angina pectoris    • CAD (coronary artery disease)    • Esophageal reflux    • GERD (gastroesophageal reflux disease)    • History of PTCA    • Hyperlipidemia    • Hypertension    • Hypertension, essential, benign    • Tobacco use disorder       Past Surgical History:   Procedure Laterality Date   • APPENDECTOMY     • CARDIAC CATHETERIZATION     • CARDIAC CATHETERIZATION Left 10/21/2020    Procedure: Cardiac Catheterization/Vascular Study;  Surgeon: Marcos Ratliff MD;  Location:  [facility];  Service: Cardiology;  Laterality: Left;   • CHOLECYSTECTOMY     • HYSTERECTOMY      total        Current Outpatient Medications:   •  albuterol sulfate  (90 Base) MCG/ACT inhaler, Inhale 2 puffs 4 (Four) Times a Day., Disp: 6.7 g, Rfl: 5  •  betamethasone dipropionate (DIPROLENE) 0.05 % ointment, Apply 1 application topically to the appropriate area as directed Daily As Needed., Disp: , Rfl:   •  cephalexin (Keflex) 500 MG capsule, Take 1 capsule by mouth 2 (Two) Times a Day., Disp: 14 capsule, Rfl: 0  •  clobetasol (TEMOVATE) 0.05 % cream, Apply  topically to the appropriate area as directed 2 (Two) Times a Day., Disp: , Rfl:   •  clopidogrel (PLAVIX) 75 MG tablet, Take 75 mg by mouth Daily., Disp: , Rfl:   •  fluticasone-salmeterol (Advair Diskus) 250-50 MCG/DOSE DISKUS, Inhale 1 puff 2 (Two) Times a Day., Disp: 1 each, Rfl: 5  •   HYDROcodone-acetaminophen (NORCO) 5-325 MG per tablet, Take 1 tablet by mouth Every 6 (Six) Hours As Needed for Severe Pain ., Disp: 20 tablet, Rfl: 0  •  Hydrocortisone, Perianal, (ANUSOL-HC) 2.5 % rectal cream, Insert  into the rectum 2 (Two) Times a Day., Disp: , Rfl:   •  levothyroxine (SYNTHROID, LEVOTHROID) 88 MCG tablet, Take 88 mcg by mouth Daily., Disp: , Rfl:   •  methylPREDNISolone (MEDROL) 4 MG dose pack, Take as directed on package instructions., Disp: 1 tablet, Rfl: 0  •  metoprolol succinate XL (TOPROL-XL) 25 MG 24 hr tablet, Take 1 tablet by mouth Daily., Disp: 90 tablet, Rfl: 3  •  Multiple Vitamins-Minerals (PRESERVISION AREDS 2+MULTI VIT PO), Take 1 capsule by mouth Daily., Disp: , Rfl:   •  simvastatin (ZOCOR) 20 MG tablet, Take 40 mg by mouth Every Night., Disp: , Rfl:       Vitals:    10/22/21 1439   BP: 110/68   Pulse: 67   Temp: 97.1 °F (36.2 °C)   SpO2: 99%         10/22/21  1439   Weight: 57.2 kg (126 lb)       Body mass index is 24.61 kg/m².      Physical Exam  Constitutional:       General: She is not in acute distress.     Appearance: She is well-developed.   HENT:      Head: Normocephalic.      Right Ear: Tympanic membrane and external ear normal.      Left Ear: Tympanic membrane and external ear normal.      Mouth/Throat:      Mouth: Mucous membranes are moist. No oral lesions.      Pharynx: Oropharynx is clear.   Eyes:      Conjunctiva/sclera: Conjunctivae normal.      Pupils: Pupils are equal, round, and reactive to light.   Cardiovascular:      Rate and Rhythm: Normal rate. Rhythm irregularly irregular.      Pulses: Normal pulses.      Heart sounds: Normal heart sounds.   Pulmonary:      Effort: Pulmonary effort is normal.      Breath sounds: Normal breath sounds.   Musculoskeletal:      Right lower leg: No edema.      Left lower leg: No edema.   Skin:     General: Skin is warm and dry.   Neurological:      Mental Status: She is alert and oriented to person, place, and time.       Gait: Gait normal.   Psychiatric:         Mood and Affect: Mood normal.         Speech: Speech normal.         Behavior: Behavior normal.         Thought Content: Thought content normal.           ECG 12 Lead    Date/Time: 10/22/2021 3:20 PM  Performed by: Helen Severino APRN  Authorized by: Helen Severino APRN   Rhythm: atrial fibrillation  Rate: tachycardic  Comments: Atrial Fibrillation with RVR.               Assessment/Plan   Diagnoses and all orders for this visit:    1. Atrial fibrillation with rapid ventricular response (HCC) (Primary)  -     ECG 12 Lead      On exam, noted very irregularly irregular heart rhythm.  EKG shows Atrial Fibrillation with RVR.  I have explained this to patient and daughter and instructed to go to ER for further evaluation and treatment.  They are agreeable to plan.  Alka left in stable condition with daughter to transport straight to the ER.  Report was called to Jacobo at [facility].

## 2021-10-23 PROBLEM — E87.20 METABOLIC ACIDOSIS: Status: ACTIVE | Noted: 2021-09-10

## 2021-10-23 LAB
ANION GAP SERPL CALCULATED.3IONS-SCNC: 14 MMOL/L (ref 5–15)
BUN SERPL-MCNC: 30 MG/DL (ref 8–23)
BUN/CREAT SERPL: 19.2 (ref 7–25)
CALCIUM SPEC-SCNC: 8.6 MG/DL (ref 8.6–10.5)
CHLORIDE SERPL-SCNC: 117 MMOL/L (ref 98–107)
CO2 SERPL-SCNC: 12 MMOL/L (ref 22–29)
CREAT SERPL-MCNC: 1.56 MG/DL (ref 0.57–1)
DEPRECATED RDW RBC AUTO: 50.4 FL (ref 37–54)
ERYTHROCYTE [DISTWIDTH] IN BLOOD BY AUTOMATED COUNT: 15.4 % (ref 12.3–15.4)
GFR SERPL CREATININE-BSD FRML MDRD: 31 ML/MIN/1.73
GLUCOSE SERPL-MCNC: 144 MG/DL (ref 65–99)
HCT VFR BLD AUTO: 35.7 % (ref 34–46.6)
HGB BLD-MCNC: 12 G/DL (ref 12–15.9)
MCH RBC QN AUTO: 30.2 PG (ref 26.6–33)
MCHC RBC AUTO-ENTMCNC: 33.6 G/DL (ref 31.5–35.7)
MCV RBC AUTO: 89.7 FL (ref 79–97)
PLATELET # BLD AUTO: 179 10*3/MM3 (ref 140–450)
PMV BLD AUTO: 10.8 FL (ref 6–12)
POTASSIUM SERPL-SCNC: 3.6 MMOL/L (ref 3.5–5.2)
RBC # BLD AUTO: 3.98 10*6/MM3 (ref 3.77–5.28)
SODIUM SERPL-SCNC: 143 MMOL/L (ref 136–145)
T3FREE SERPL-MCNC: 1.83 PG/ML (ref 2–4.4)
TROPONIN T SERPL-MCNC: 0.03 NG/ML (ref 0–0.03)
TROPONIN T SERPL-MCNC: 0.04 NG/ML (ref 0–0.03)
TROPONIN T SERPL-MCNC: NORMAL
WBC # BLD AUTO: 12.77 10*3/MM3 (ref 3.4–10.8)

## 2021-10-23 PROCEDURE — 63710000001 PREDNISONE PER 1 MG: Performed by: NURSE PRACTITIONER

## 2021-10-23 PROCEDURE — 36415 COLL VENOUS BLD VENIPUNCTURE: CPT | Performed by: NURSE PRACTITIONER

## 2021-10-23 PROCEDURE — 94799 UNLISTED PULMONARY SVC/PX: CPT

## 2021-10-23 PROCEDURE — 84484 ASSAY OF TROPONIN QUANT: CPT | Performed by: NURSE PRACTITIONER

## 2021-10-23 PROCEDURE — 85027 COMPLETE CBC AUTOMATED: CPT | Performed by: NURSE PRACTITIONER

## 2021-10-23 PROCEDURE — 84481 FREE ASSAY (FT-3): CPT | Performed by: NURSE PRACTITIONER

## 2021-10-23 PROCEDURE — 80048 BASIC METABOLIC PNL TOTAL CA: CPT | Performed by: NURSE PRACTITIONER

## 2021-10-23 RX ORDER — BUDESONIDE AND FORMOTEROL FUMARATE DIHYDRATE 160; 4.5 UG/1; UG/1
2 AEROSOL RESPIRATORY (INHALATION)
Refills: 5 | Status: DISCONTINUED | OUTPATIENT
Start: 2021-10-23 | End: 2021-10-24 | Stop reason: HOSPADM

## 2021-10-23 RX ORDER — ATORVASTATIN CALCIUM 10 MG/1
10 TABLET, FILM COATED ORAL DAILY
Status: DISCONTINUED | OUTPATIENT
Start: 2021-10-23 | End: 2021-10-24 | Stop reason: HOSPADM

## 2021-10-23 RX ORDER — SODIUM BICARBONATE 650 MG/1
325 TABLET ORAL 2 TIMES DAILY
Status: DISCONTINUED | OUTPATIENT
Start: 2021-10-23 | End: 2021-10-24 | Stop reason: HOSPADM

## 2021-10-23 RX ORDER — METOPROLOL SUCCINATE 25 MG/1
25 TABLET, EXTENDED RELEASE ORAL DAILY
Status: DISCONTINUED | OUTPATIENT
Start: 2021-10-23 | End: 2021-10-24 | Stop reason: HOSPADM

## 2021-10-23 RX ORDER — LEVOTHYROXINE SODIUM 88 UG/1
88 TABLET ORAL DAILY
Status: DISCONTINUED | OUTPATIENT
Start: 2021-10-24 | End: 2021-10-24 | Stop reason: HOSPADM

## 2021-10-23 RX ADMIN — ATORVASTATIN CALCIUM 10 MG: 10 TABLET, FILM COATED ORAL at 17:09

## 2021-10-23 RX ADMIN — METOPROLOL SUCCINATE 25 MG: 25 TABLET, EXTENDED RELEASE ORAL at 17:09

## 2021-10-23 RX ADMIN — IPRATROPIUM BROMIDE AND ALBUTEROL SULFATE 3 ML: 2.5; .5 SOLUTION RESPIRATORY (INHALATION) at 10:53

## 2021-10-23 RX ADMIN — SODIUM BICARBONATE 325 MG: 650 TABLET ORAL at 17:08

## 2021-10-23 RX ADMIN — DILTIAZEM HYDROCHLORIDE 30 MG: 30 TABLET, FILM COATED ORAL at 05:47

## 2021-10-23 RX ADMIN — APIXABAN 2.5 MG: 2.5 TABLET, FILM COATED ORAL at 20:26

## 2021-10-23 RX ADMIN — DILTIAZEM HYDROCHLORIDE 30 MG: 30 TABLET, FILM COATED ORAL at 23:33

## 2021-10-23 RX ADMIN — DILTIAZEM HYDROCHLORIDE 30 MG: 30 TABLET, FILM COATED ORAL at 01:08

## 2021-10-23 RX ADMIN — Medication 10 ML: at 20:27

## 2021-10-23 RX ADMIN — GUAIFENESIN 1200 MG: 600 TABLET, EXTENDED RELEASE ORAL at 20:26

## 2021-10-23 RX ADMIN — IPRATROPIUM BROMIDE AND ALBUTEROL SULFATE 3 ML: 2.5; .5 SOLUTION RESPIRATORY (INHALATION) at 14:42

## 2021-10-23 RX ADMIN — SODIUM BICARBONATE 325 MG: 650 TABLET ORAL at 20:26

## 2021-10-23 RX ADMIN — IPRATROPIUM BROMIDE AND ALBUTEROL SULFATE 3 ML: 2.5; .5 SOLUTION RESPIRATORY (INHALATION) at 19:02

## 2021-10-23 RX ADMIN — PREDNISONE 20 MG: 20 TABLET ORAL at 09:29

## 2021-10-23 RX ADMIN — GUAIFENESIN 1200 MG: 600 TABLET, EXTENDED RELEASE ORAL at 09:29

## 2021-10-23 RX ADMIN — DILTIAZEM HYDROCHLORIDE 30 MG: 30 TABLET, FILM COATED ORAL at 17:07

## 2021-10-23 RX ADMIN — IPRATROPIUM BROMIDE AND ALBUTEROL SULFATE 3 ML: 2.5; .5 SOLUTION RESPIRATORY (INHALATION) at 06:26

## 2021-10-23 RX ADMIN — BUDESONIDE AND FORMOTEROL FUMARATE DIHYDRATE 2 PUFF: 160; 4.5 AEROSOL RESPIRATORY (INHALATION) at 19:02

## 2021-10-24 ENCOUNTER — APPOINTMENT (OUTPATIENT)
Dept: CARDIOLOGY | Facility: HOSPITAL | Age: 86
End: 2021-10-24

## 2021-10-24 ENCOUNTER — READMISSION MANAGEMENT (OUTPATIENT)
Dept: CALL CENTER | Facility: HOSPITAL | Age: 86
End: 2021-10-24

## 2021-10-24 VITALS
WEIGHT: 130.4 LBS | HEIGHT: 60 IN | RESPIRATION RATE: 16 BRPM | BODY MASS INDEX: 25.6 KG/M2 | DIASTOLIC BLOOD PRESSURE: 66 MMHG | TEMPERATURE: 98.1 F | OXYGEN SATURATION: 98 % | HEART RATE: 62 BPM | SYSTOLIC BLOOD PRESSURE: 112 MMHG

## 2021-10-24 PROBLEM — E44.0 MODERATE MALNUTRITION (HCC): Status: ACTIVE | Noted: 2021-10-24

## 2021-10-24 LAB
ANION GAP SERPL CALCULATED.3IONS-SCNC: 13 MMOL/L (ref 5–15)
BASOPHILS # BLD AUTO: 0.02 10*3/MM3 (ref 0–0.2)
BASOPHILS NFR BLD AUTO: 0.2 % (ref 0–1.5)
BH CV ECHO MEAS - AO MAX PG (FULL): 4.8 MMHG
BH CV ECHO MEAS - AO MAX PG: 8 MMHG
BH CV ECHO MEAS - AO MEAN PG (FULL): 3 MMHG
BH CV ECHO MEAS - AO MEAN PG: 4 MMHG
BH CV ECHO MEAS - AO ROOT AREA (BSA CORRECTED): 1.8
BH CV ECHO MEAS - AO ROOT AREA: 5.7 CM^2
BH CV ECHO MEAS - AO ROOT DIAM: 2.7 CM
BH CV ECHO MEAS - AO V2 MAX: 141 CM/SEC
BH CV ECHO MEAS - AO V2 MEAN: 93.7 CM/SEC
BH CV ECHO MEAS - AO V2 VTI: 32.5 CM
BH CV ECHO MEAS - AVA(I,A): 1.1 CM^2
BH CV ECHO MEAS - AVA(I,D): 1.1 CM^2
BH CV ECHO MEAS - AVA(V,A): 1.4 CM^2
BH CV ECHO MEAS - AVA(V,D): 1.4 CM^2
BH CV ECHO MEAS - BSA(HAYCOCK): 1.6 M^2
BH CV ECHO MEAS - BSA: 1.5 M^2
BH CV ECHO MEAS - BZI_BMI: 24.6 KILOGRAMS/M^2
BH CV ECHO MEAS - BZI_METRIC_HEIGHT: 152.4 CM
BH CV ECHO MEAS - BZI_METRIC_WEIGHT: 57.2 KG
BH CV ECHO MEAS - EDV(CUBED): 64 ML
BH CV ECHO MEAS - EDV(MOD-SP2): 41.9 ML
BH CV ECHO MEAS - EDV(MOD-SP4): 61.6 ML
BH CV ECHO MEAS - EDV(TEICH): 70 ML
BH CV ECHO MEAS - EF(CUBED): 85.5 %
BH CV ECHO MEAS - EF(MOD-SP2): 60.4 %
BH CV ECHO MEAS - EF(MOD-SP4): 60.2 %
BH CV ECHO MEAS - EF(TEICH): 79.4 %
BH CV ECHO MEAS - ESV(CUBED): 9.3 ML
BH CV ECHO MEAS - ESV(MOD-SP2): 16.6 ML
BH CV ECHO MEAS - ESV(MOD-SP4): 24.5 ML
BH CV ECHO MEAS - ESV(TEICH): 14.4 ML
BH CV ECHO MEAS - FS: 47.5 %
BH CV ECHO MEAS - IVS/LVPW: 1.4
BH CV ECHO MEAS - IVSD: 1 CM
BH CV ECHO MEAS - LA DIMENSION: 3.3 CM
BH CV ECHO MEAS - LA/AO: 1.2
BH CV ECHO MEAS - LAT PEAK E' VEL: 7 CM/SEC
BH CV ECHO MEAS - LV DIASTOLIC VOL/BSA (35-75): 40.2 ML/M^2
BH CV ECHO MEAS - LV MASS(C)D: 110.4 GRAMS
BH CV ECHO MEAS - LV MASS(C)DI: 72 GRAMS/M^2
BH CV ECHO MEAS - LV MAX PG: 3.1 MMHG
BH CV ECHO MEAS - LV MEAN PG: 1 MMHG
BH CV ECHO MEAS - LV SYSTOLIC VOL/BSA (12-30): 16 ML/M^2
BH CV ECHO MEAS - LV V1 MAX: 88.7 CM/SEC
BH CV ECHO MEAS - LV V1 MEAN: 48.9 CM/SEC
BH CV ECHO MEAS - LV V1 VTI: 17.1 CM
BH CV ECHO MEAS - LVIDD: 4 CM
BH CV ECHO MEAS - LVIDS: 2.1 CM
BH CV ECHO MEAS - LVLD AP2: 6.3 CM
BH CV ECHO MEAS - LVLD AP4: 6.5 CM
BH CV ECHO MEAS - LVLS AP2: 5 CM
BH CV ECHO MEAS - LVLS AP4: 5.6 CM
BH CV ECHO MEAS - LVOT AREA (M): 2.3 CM^2
BH CV ECHO MEAS - LVOT AREA: 2.2 CM^2
BH CV ECHO MEAS - LVOT DIAM: 1.7 CM
BH CV ECHO MEAS - LVPWD: 1.2 CM
BH CV ECHO MEAS - MED PEAK E' VEL: 6.74 CM/SEC
BH CV ECHO MEAS - MR MAX PG: 75.3 MMHG
BH CV ECHO MEAS - MR MAX VEL: 434 CM/SEC
BH CV ECHO MEAS - MV A MAX VEL: 84.4 CM/SEC
BH CV ECHO MEAS - MV DEC TIME: 0.21 SEC
BH CV ECHO MEAS - MV E MAX VEL: 91.7 CM/SEC
BH CV ECHO MEAS - MV E/A: 1.1
BH CV ECHO MEAS - PI END-D VEL: 66.9 CM/SEC
BH CV ECHO MEAS - SI(AO): 121.3 ML/M^2
BH CV ECHO MEAS - SI(CUBED): 35.7 ML/M^2
BH CV ECHO MEAS - SI(LVOT): 24.3 ML/M^2
BH CV ECHO MEAS - SI(MOD-SP2): 16.5 ML/M^2
BH CV ECHO MEAS - SI(MOD-SP4): 24.2 ML/M^2
BH CV ECHO MEAS - SI(TEICH): 36.2 ML/M^2
BH CV ECHO MEAS - SV(AO): 186.1 ML
BH CV ECHO MEAS - SV(CUBED): 54.7 ML
BH CV ECHO MEAS - SV(LVOT): 37.3 ML
BH CV ECHO MEAS - SV(MOD-SP2): 25.3 ML
BH CV ECHO MEAS - SV(MOD-SP4): 37.1 ML
BH CV ECHO MEAS - SV(TEICH): 55.6 ML
BH CV ECHO MEAS - TR MAX VEL: 179 CM/SEC
BH CV ECHO MEASUREMENTS AVERAGE E/E' RATIO: 13.35
BUN SERPL-MCNC: 26 MG/DL (ref 8–23)
BUN/CREAT SERPL: 15.7 (ref 7–25)
CALCIUM SPEC-SCNC: 8.9 MG/DL (ref 8.6–10.5)
CHLORIDE SERPL-SCNC: 118 MMOL/L (ref 98–107)
CO2 SERPL-SCNC: 12 MMOL/L (ref 22–29)
CREAT SERPL-MCNC: 1.66 MG/DL (ref 0.57–1)
DEPRECATED RDW RBC AUTO: 51.8 FL (ref 37–54)
EOSINOPHIL # BLD AUTO: 0.06 10*3/MM3 (ref 0–0.4)
EOSINOPHIL NFR BLD AUTO: 0.6 % (ref 0.3–6.2)
ERYTHROCYTE [DISTWIDTH] IN BLOOD BY AUTOMATED COUNT: 15.9 % (ref 12.3–15.4)
GFR SERPL CREATININE-BSD FRML MDRD: 29 ML/MIN/1.73
GLUCOSE SERPL-MCNC: 110 MG/DL (ref 65–99)
HCT VFR BLD AUTO: 32.7 % (ref 34–46.6)
HGB BLD-MCNC: 10.6 G/DL (ref 12–15.9)
IMM GRANULOCYTES # BLD AUTO: 0.09 10*3/MM3 (ref 0–0.05)
IMM GRANULOCYTES NFR BLD AUTO: 0.9 % (ref 0–0.5)
LEFT ATRIUM VOLUME INDEX: 34.4 ML/M2
LEFT ATRIUM VOLUME: 52.7 CM3
LYMPHOCYTES # BLD AUTO: 1.26 10*3/MM3 (ref 0.7–3.1)
LYMPHOCYTES NFR BLD AUTO: 12.6 % (ref 19.6–45.3)
MAGNESIUM SERPL-MCNC: 2.2 MG/DL (ref 1.6–2.4)
MAXIMAL PREDICTED HEART RATE: 134 BPM
MCH RBC QN AUTO: 29.1 PG (ref 26.6–33)
MCHC RBC AUTO-ENTMCNC: 32.4 G/DL (ref 31.5–35.7)
MCV RBC AUTO: 89.8 FL (ref 79–97)
MONOCYTES # BLD AUTO: 0.67 10*3/MM3 (ref 0.1–0.9)
MONOCYTES NFR BLD AUTO: 6.7 % (ref 5–12)
NEUTROPHILS NFR BLD AUTO: 7.89 10*3/MM3 (ref 1.7–7)
NEUTROPHILS NFR BLD AUTO: 79 % (ref 42.7–76)
NRBC BLD AUTO-RTO: 0 /100 WBC (ref 0–0.2)
PHOSPHATE SERPL-MCNC: 3 MG/DL (ref 2.5–4.5)
PLATELET # BLD AUTO: 192 10*3/MM3 (ref 140–450)
PMV BLD AUTO: 11 FL (ref 6–12)
POTASSIUM SERPL-SCNC: 3.2 MMOL/L (ref 3.5–5.2)
RBC # BLD AUTO: 3.64 10*6/MM3 (ref 3.77–5.28)
SODIUM SERPL-SCNC: 143 MMOL/L (ref 136–145)
STRESS TARGET HR: 114 BPM
WBC # BLD AUTO: 9.99 10*3/MM3 (ref 3.4–10.8)

## 2021-10-24 PROCEDURE — 83735 ASSAY OF MAGNESIUM: CPT | Performed by: INTERNAL MEDICINE

## 2021-10-24 PROCEDURE — 97165 OT EVAL LOW COMPLEX 30 MIN: CPT | Performed by: OCCUPATIONAL THERAPIST

## 2021-10-24 PROCEDURE — 84100 ASSAY OF PHOSPHORUS: CPT | Performed by: INTERNAL MEDICINE

## 2021-10-24 PROCEDURE — 94799 UNLISTED PULMONARY SVC/PX: CPT

## 2021-10-24 PROCEDURE — 63710000001 PREDNISONE PER 1 MG: Performed by: NURSE PRACTITIONER

## 2021-10-24 PROCEDURE — 85025 COMPLETE CBC W/AUTO DIFF WBC: CPT | Performed by: INTERNAL MEDICINE

## 2021-10-24 PROCEDURE — 93306 TTE W/DOPPLER COMPLETE: CPT

## 2021-10-24 PROCEDURE — 93306 TTE W/DOPPLER COMPLETE: CPT | Performed by: INTERNAL MEDICINE

## 2021-10-24 PROCEDURE — 80048 BASIC METABOLIC PNL TOTAL CA: CPT | Performed by: INTERNAL MEDICINE

## 2021-10-24 RX ORDER — ATORVASTATIN CALCIUM 10 MG/1
10 TABLET, FILM COATED ORAL DAILY
Qty: 30 TABLET | Refills: 0 | Status: SHIPPED | OUTPATIENT
Start: 2021-10-25 | End: 2022-03-25

## 2021-10-24 RX ORDER — DILTIAZEM HYDROCHLORIDE 120 MG/1
120 CAPSULE, COATED, EXTENDED RELEASE ORAL
Status: DISCONTINUED | OUTPATIENT
Start: 2021-10-24 | End: 2021-10-24 | Stop reason: HOSPADM

## 2021-10-24 RX ORDER — DILTIAZEM HYDROCHLORIDE 120 MG/1
120 CAPSULE, COATED, EXTENDED RELEASE ORAL
Qty: 30 CAPSULE | Refills: 0 | Status: SHIPPED | OUTPATIENT
Start: 2021-10-25 | End: 2022-04-01 | Stop reason: ALTCHOICE

## 2021-10-24 RX ORDER — SODIUM BICARBONATE 325 MG/1
325 TABLET ORAL 2 TIMES DAILY
Qty: 20 TABLET | Refills: 0 | Status: SHIPPED | OUTPATIENT
Start: 2021-10-24 | End: 2022-11-16

## 2021-10-24 RX ORDER — PREDNISONE 20 MG/1
20 TABLET ORAL
Qty: 3 TABLET | Refills: 0 | Status: SHIPPED | OUTPATIENT
Start: 2021-10-25 | End: 2021-10-28

## 2021-10-24 RX ORDER — POTASSIUM CHLORIDE 750 MG/1
40 CAPSULE, EXTENDED RELEASE ORAL ONCE
Status: COMPLETED | OUTPATIENT
Start: 2021-10-24 | End: 2021-10-24

## 2021-10-24 RX ADMIN — IPRATROPIUM BROMIDE AND ALBUTEROL SULFATE 3 ML: 2.5; .5 SOLUTION RESPIRATORY (INHALATION) at 10:42

## 2021-10-24 RX ADMIN — GUAIFENESIN 1200 MG: 600 TABLET, EXTENDED RELEASE ORAL at 08:58

## 2021-10-24 RX ADMIN — SODIUM BICARBONATE 325 MG: 650 TABLET ORAL at 08:58

## 2021-10-24 RX ADMIN — APIXABAN 2.5 MG: 2.5 TABLET, FILM COATED ORAL at 08:58

## 2021-10-24 RX ADMIN — IPRATROPIUM BROMIDE AND ALBUTEROL SULFATE 3 ML: 2.5; .5 SOLUTION RESPIRATORY (INHALATION) at 07:20

## 2021-10-24 RX ADMIN — DILTIAZEM HYDROCHLORIDE 30 MG: 30 TABLET, FILM COATED ORAL at 05:21

## 2021-10-24 RX ADMIN — PREDNISONE 20 MG: 20 TABLET ORAL at 08:58

## 2021-10-24 RX ADMIN — DILTIAZEM HYDROCHLORIDE 120 MG: 120 CAPSULE, COATED, EXTENDED RELEASE ORAL at 13:16

## 2021-10-24 RX ADMIN — IPRATROPIUM BROMIDE AND ALBUTEROL SULFATE 3 ML: 2.5; .5 SOLUTION RESPIRATORY (INHALATION) at 14:46

## 2021-10-24 RX ADMIN — POTASSIUM CHLORIDE 40 MEQ: 10 CAPSULE, COATED, EXTENDED RELEASE ORAL at 13:16

## 2021-10-24 RX ADMIN — METOPROLOL SUCCINATE 25 MG: 25 TABLET, EXTENDED RELEASE ORAL at 08:58

## 2021-10-24 RX ADMIN — LEVOTHYROXINE SODIUM 88 MCG: 88 TABLET ORAL at 08:58

## 2021-10-24 RX ADMIN — BUDESONIDE AND FORMOTEROL FUMARATE DIHYDRATE 2 PUFF: 160; 4.5 AEROSOL RESPIRATORY (INHALATION) at 07:20

## 2021-10-24 RX ADMIN — Medication 10 ML: at 08:59

## 2021-10-24 RX ADMIN — ATORVASTATIN CALCIUM 10 MG: 10 TABLET, FILM COATED ORAL at 08:58

## 2021-10-24 NOTE — OUTREACH NOTE
Prep Survey      Responses   [facility] facility patient discharged from? [facility]   Is LACE score < 7 ? No   Emergency Room discharge w/ pulse ox? No   Eligibility [facility]   Date of Admission 10/22/21   Date of Discharge 10/24/21   Discharge Disposition Home-Health Care [facility]   Discharge diagnosis A-fib RVR, CKD, hypokalemia, failure to thrive,    Does the patient have one of the following disease processes/diagnoses(primary or secondary)? Other   Does the patient have Home health ordered? No   Is there a DME ordered? No   Prep survey completed? Yes          Aleta Irene RN

## 2021-10-25 ENCOUNTER — TRANSITIONAL CARE MANAGEMENT TELEPHONE ENCOUNTER (OUTPATIENT)
Dept: CALL CENTER | Facility: HOSPITAL | Age: 86
End: 2021-10-25

## 2021-10-25 LAB
QT INTERVAL: 336 MS
QTC INTERVAL: 456 MS

## 2021-10-25 NOTE — OUTREACH NOTE
Call Center TCM Note      Responses   [facility] patient discharged from? [facility]   Does the patient have one of the following disease processes/diagnoses(primary or secondary)? Other   TCM attempt successful? Yes  [Verbal [place] states can speak with Genesis Ames and John]   Call start time 1155   Call end time 1203   Discharge diagnosis A-fib RVR, CKD, hypokalemia, failure to thrive,    Meds reviewed with patient/caregiver? Yes   Is the patient having any side effects they believe may be caused by any medication additions or changes? No   Does the patient have all medications ordered at discharge? Yes   Is the patient taking all medications as directed (includes completed medication regime)? Yes   Does the patient have a primary care provider?  Yes   Does the patient have an appointment with their PCP within 7 days of discharge? Yes   Comments regarding PCP Hospital d/c f/u appt 10/27/21 9:45am.    Has the patient kept scheduled appointments due by today? N/A   Psychosocial issues? No   Did the patient receive a copy of their discharge instructions? Yes   Nursing interventions Reviewed instructions with patient   What is the patient's perception of their health status since discharge? Improving   Is the patient/caregiver able to teach back signs and symptoms related to disease process for when to call PCP? Yes   Is the patient/caregiver able to teach back signs and symptoms related to disease process for when to call 911? Yes   Is the patient/caregiver able to teach back the hierarchy of who to call/visit for symptoms/problems? PCP, Specialist, Home health nurse, Urgent Care, ED, 911 Yes   If the patient is a current smoker, are they able to teach back resources for cessation? Not a smoker   TCM call completed? Yes          Alissa Arambula RN    10/25/2021, 12:05 EDT

## 2021-10-27 ENCOUNTER — OFFICE VISIT (OUTPATIENT)
Dept: INTERNAL MEDICINE | Facility: CLINIC | Age: 86
End: 2021-10-27

## 2021-10-27 VITALS
OXYGEN SATURATION: 100 % | TEMPERATURE: 97.1 F | SYSTOLIC BLOOD PRESSURE: 126 MMHG | HEIGHT: 60 IN | DIASTOLIC BLOOD PRESSURE: 72 MMHG | HEART RATE: 61 BPM | BODY MASS INDEX: 25.13 KG/M2 | WEIGHT: 128 LBS

## 2021-10-27 DIAGNOSIS — N18.4 CHRONIC KIDNEY DISEASE, STAGE IV (SEVERE) (HCC): ICD-10-CM

## 2021-10-27 DIAGNOSIS — E87.6 HYPOKALEMIA: ICD-10-CM

## 2021-10-27 DIAGNOSIS — Z09 HOSPITAL DISCHARGE FOLLOW-UP: Primary | ICD-10-CM

## 2021-10-27 DIAGNOSIS — E53.8 VITAMIN B12 DEFICIENCY: ICD-10-CM

## 2021-10-27 DIAGNOSIS — N30.01 ACUTE CYSTITIS WITH HEMATURIA: ICD-10-CM

## 2021-10-27 DIAGNOSIS — I48.91 ATRIAL FIBRILLATION WITH RVR (HCC): ICD-10-CM

## 2021-10-27 DIAGNOSIS — R39.9 UTI SYMPTOMS: ICD-10-CM

## 2021-10-27 PROCEDURE — 99496 TRANSJ CARE MGMT HIGH F2F 7D: CPT | Performed by: NURSE PRACTITIONER

## 2021-10-27 PROCEDURE — 81003 URINALYSIS AUTO W/O SCOPE: CPT | Performed by: NURSE PRACTITIONER

## 2021-10-27 PROCEDURE — 1111F DSCHRG MED/CURRENT MED MERGE: CPT | Performed by: NURSE PRACTITIONER

## 2021-10-27 RX ORDER — AMOXICILLIN AND CLAVULANATE POTASSIUM 250; 125 MG/1; MG/1
1 TABLET, FILM COATED ORAL 2 TIMES DAILY
Qty: 14 TABLET | Refills: 0 | Status: SHIPPED | OUTPATIENT
Start: 2021-10-27 | End: 2021-11-10

## 2021-10-27 NOTE — PROGRESS NOTES
Transitional Care Follow Up Visit  Subjective     Alka Nicolas is a 86 y.o. female who presents for a transitional care management visit.    Within 48 business hours after discharge our office contacted her via telephone to coordinate her care and needs.      I reviewed and discussed the details of that call along with the discharge summary, hospital problems, inpatient lab results, inpatient diagnostic studies, and consultation reports with Alka.     Current outpatient and discharge medications have been reconciled for the patient.  Reviewed by: CHRISTIANO Romero      Date of TCM Phone Call 10/24/2021   [facility]   Date of Admission 10/22/2021   Date of Discharge 10/24/2021   Discharge Disposition Home-Health Care [facility]     Risk for Readmission (LACE) Score: 10 (10/24/2021  5:00 AM)      Alka is a pleasant 86-year-old female who presents with daughter after hospital admission from 10/22-10/24.  Patient was admitted related to hypokalemia (2.4) and new onset of atrial fib with RVR.  Patient had been started on Cardizem and Eliquis.  She was given bicarb with potassium replacement to manage her metabolic acidosis.  Was discharged with stable potassium of 3.2.  She reports that she has potassium 10 mg daily from a previous prescription Dr. Rubio gave her in the past.  Patient and daughter are very anxious about her potassium level and her chronic kidney disease.  Patient is scheduled to see Dr. Chirinos in November.  She had a echocardiogram done which showed mild anterior mitral leaflet thickening and mitral valve stenosis with a left ejection fracture of 56-60%.  Patient's daughter and patient report that cardiologist had called them at their home and discussed the echocardiogram findings.  Patient denies any palpitations or shortness of breath or chest pain.  She is having some significant fatigue but is improving since hospital discharge.    Patient's daughter had brought up at  their last visit with Dr. Rubio that patient had had a 22 pound weight loss in 2 months.  Per Dr. Rubio's note if no significant improvement might want to complete a full body scan.  Patient's weight has plateaued at this time but daughter and patient are still concerned and would like to discuss this with Dr. Rubio after her acute illness has overall resolved. She denies abdominal pain, diarrhea, change in bowel patterns. Has a history of constipation, which is well controlled with dolculax as needed. She does have UTI symptoms today, previously treated for a UTI 10/13 and had mild resolution of symptoms, but worsening again since hospital discharge. She has increased her hydration and has started urinating more frequently with improvement in dysuria mildly.      Course During Hospital Stay:  10/22-10/24    Patient presented to the hospital on 10/22 with weakness and shortness of breath.  In the ER potassium was found to be severely low at 2.6.  She still has renal dysfunction but it actually appears improved from prior recent admission and discharge on 9/12.  She was however found to be in atrial fibrillation with RVR.  She denies any chest pain or dizziness.  She was admitted to the hospital in the evening of 10/22 and started on p.o. Cardizem.  The next a.m. she was resumed on her previous home metoprolol to go along with Cardizem.  With that over the next 24 hours her rates initially became well controlled then she actually converted back to sinus rhythm going into the morning of 10/24.  Her Cardizem has been converted to CD.  Blood pressure is tolerating this well.  She was also started on Eliquis 2.5 twice daily for anticoagulation on 10/23 and has tolerated this well with no signs of bleeding.  She was started on Eliquis decreased dosing in the setting of her renal function, age, body weight.  Overall today she is doing well and wants to go home.  Only thing pending at this time is her 2D echo which has  been done but not yet read.  Preliminarily it looks like she has a preserved EF.  Her final echo will need be followed up on for valvular dysfunction and EF.          The following portions of the patient's history were reviewed and updated as appropriate: allergies, current medications, past family history, past medical history, past social history, past surgical history and problem list.    Review of Systems   Constitutional: Positive for activity change, appetite change, fatigue and unexpected weight change. Negative for fever.   HENT: Negative for congestion, ear discharge, ear pain, hearing loss, postnasal drip, rhinorrhea, sinus pressure, tinnitus, trouble swallowing and voice change.    Eyes: Negative for discharge and visual disturbance.   Respiratory: Negative for apnea, cough and shortness of breath.    Cardiovascular: Negative for chest pain, palpitations and leg swelling.   Gastrointestinal: Negative for abdominal pain, blood in stool, constipation and rectal pain.   Endocrine: Negative for cold intolerance, heat intolerance, polydipsia and polyphagia.   Genitourinary: Positive for dysuria and frequency. Negative for decreased urine volume, difficulty urinating, hematuria and urgency.   Musculoskeletal: Negative for arthralgias, back pain, myalgias, neck pain and neck stiffness.   Skin: Negative for color change, rash and wound.   Allergic/Immunologic: Negative for environmental allergies.   Neurological: Positive for weakness. Negative for dizziness, speech difficulty, numbness and headaches.   Hematological: Negative for adenopathy. Does not bruise/bleed easily.   Psychiatric/Behavioral: Negative for agitation, confusion, decreased concentration and sleep disturbance. The patient is not nervous/anxious.        Objective   Physical Exam  Vitals and nursing note reviewed.   Constitutional:       Appearance: Normal appearance. She is well-developed, well-groomed and normal weight.   HENT:      Head:  Normocephalic and atraumatic.      Right Ear: Hearing, tympanic membrane, ear canal and external ear normal.      Left Ear: Hearing, tympanic membrane, ear canal and external ear normal.      Nose: Nose normal.      Mouth/Throat:      Lips: Pink.      Mouth: Mucous membranes are moist.   Eyes:      General: Lids are normal. Lids are everted, no foreign bodies appreciated. Vision grossly intact.      Conjunctiva/sclera: Conjunctivae normal.      Pupils: Pupils are equal, round, and reactive to light.   Neck:      Thyroid: No thyromegaly.      Vascular: No carotid bruit.   Cardiovascular:      Rate and Rhythm: Normal rate and regular rhythm. Occasional extrasystoles are present.     Pulses: Normal pulses.      Heart sounds: Normal heart sounds. No murmur heard.      Pulmonary:      Effort: Pulmonary effort is normal.      Breath sounds: Normal breath sounds.   Abdominal:      General: Abdomen is protuberant. Bowel sounds are normal.      Palpations: Abdomen is soft. There is no hepatomegaly, splenomegaly or mass.      Tenderness: There is no abdominal tenderness. There is no right CVA tenderness, left CVA tenderness, guarding or rebound.      Hernia: No hernia is present.   Musculoskeletal:      Cervical back: Normal range of motion and neck supple.      Lumbar back: Spasms and tenderness present. Positive left straight leg raise test.      Left hip: Tenderness present. Decreased range of motion.      Right lower leg: No edema.      Left lower leg: No edema.   Lymphadenopathy:      Head:      Right side of head: No submental, submandibular, tonsillar, preauricular, posterior auricular or occipital adenopathy.      Left side of head: No submental, submandibular, tonsillar, preauricular, posterior auricular or occipital adenopathy.      Cervical: No cervical adenopathy.   Skin:     General: Skin is warm and dry.      Capillary Refill: Capillary refill takes less than 2 seconds.      Findings: No bruising, erythema, rash  or wound.   Neurological:      General: No focal deficit present.      Mental Status: She is alert and oriented to person, place, and time.      Deep Tendon Reflexes: Reflexes are normal and symmetric.   Psychiatric:         Attention and Perception: Attention normal.         Mood and Affect: Mood normal.         Speech: Speech normal.         Behavior: Behavior normal. Behavior is cooperative.         Thought Content: Thought content normal.         Cognition and Memory: Cognition and memory normal.         Judgment: Judgment normal.         Assessment/Plan   Diagnoses and all orders for this visit:    1. Hospital discharge follow-up (Primary)    2. Hypokalemia  -     Basic Metabolic Panel; Standing    3. Chronic kidney disease, stage IV (severe) ([facility])  -     Basic Metabolic Panel; Standing  -     amoxicillin-clavulanate (AUGMENTIN) 250-125 MG per tablet; Take 1 tablet by mouth 2 (Two) Times a Day.  Dispense: 14 tablet; Refill: 0    4. UTI symptoms  -     Urine Culture - Urine, Urine, Clean Catch  -     POC Urinalysis Dipstick, Multipro  -     amoxicillin-clavulanate (AUGMENTIN) 250-125 MG per tablet; Take 1 tablet by mouth 2 (Two) Times a Day.  Dispense: 14 tablet; Refill: 0    5. Vitamin B12 deficiency  -     Vitamin B12    6. Acute cystitis with hematuria  -     amoxicillin-clavulanate (AUGMENTIN) 250-125 MG per tablet; Take 1 tablet by mouth 2 (Two) Times a Day.  Dispense: 14 tablet; Refill: 0    7. Atrial fibrillation with RVR ([facility])  Comments:  delmer Rucker is doing better since being discharged from the hospital with only complaints of fatigue.  Patient will have a BMP rechecked today to monitor her hypokalemia.  We will have her go ahead and start potassium 10 and will make adjustments according to her potassium once resulted tomorrow morning.  We will monitor her BMP once a week to indicate improving renal function and stable potassium levels.  Patient will proceed with follow-up to see   Candis related to chronic kidney disease.  Continue her bicarb at this time.    Patient does have a acute kidney injury possibly worsened by UTI.  We will go ahead and give her Augmentin according to the renal dosing on up-to-date.  We will recheck her urine 1 week after completing antibiotics.  Encouraged her to continue increase oral hydration.  Daughter states that prior to her hospitalization she had not been drinking enough water most days.  Will encourage her to drink at least 60 to 80 ounces of water a day.    We will check vitamin B12 level she had previously been on vitamin B12 injections and had not received one in some time per patient.  If indicated at a lower level can have her come by the office for vitamin B12 injection.  However if within normal level can just have her take start vitamin B12 oral intake.    Patient is having a 22 pound weight loss over 2 months.  Related to her acute on chronic kidney injury would not advise contrast at this time and weight has plateaued since the initial weight loss.  Patient reports no acute signs of needing urgent imaging assessment.  Patient and daughter would like to wait until their visit with Dr. Rubio in the next 2 weeks to determine if she is a candidate for full body scan to rule out malignancy as a cause of weight loss.    Patient discussed her echocardiogram with cardiology.  No further questions at our visit today.  Advised her to continue on the Cardizem, Eliquis as prescribed.  Patient verbalizes understanding signs and symptoms requiring urgent assessment and ER.  Patient denies any of the symptoms since hospital discharge or an office visit today.

## 2021-10-27 NOTE — PATIENT INSTRUCTIONS
"Food Basics for Chronic Kidney Disease  Chronic kidney disease (CKD) occurs when the kidneys are permanently damaged over a long period of time. When your kidneys are not working well, they cannot remove waste, fluids, and other substances from your blood as well as they did before. The substances can build up, which can worsen kidney damage and affect how your body functions. Certain foods lead to a buildup of these substances. By changing your diet, you can help prevent more kidney damage and delay or prevent the need for dialysis.  What are tips for following this plan?  Reading food labels  · Check the amount of salt (sodium) in foods. Choose foods that have less than 300 milligrams (mg) per serving.  · Check the ingredient list for phosphorus or potassium-based additives or preservatives.  · Check the amount of saturated fat and trans fat. Limit or avoid these fats as told by your dietitian.  Shopping  · Avoid buying foods that are:  ? Processed or prepackaged.  ? Calcium-enriched or that have calcium added to them (are fortified).  · Do not buy foods that have salt or sodium listed among the first five ingredients.  · Buy canned vegetables and beans that say \"no salt added\" or \"low sodium\" and rinse them before eating.  Cooking  · Soak vegetables, such as potatoes, before cooking to reduce potassium. To do this:  1. Peel and cut the vegetables into small pieces.  2. Soak the vegetables in warm water for at least 2 hours. For every 1 cup of vegetables, use 10 cups of water.  3. Drain and rinse the vegetables with warm water.  4. Boil the vegetables for at least 5 minutes.  Meal planning  · Limit the amount of protein you eat from plant and animal sources each day.  · Do not add salt to food when cooking or before eating.  · Eat meals and snacks at around the same time each day.  General information  · Talk with your health care provider about whether you should take a vitamin and mineral supplement.  · Use " standard measuring cups and spoons to measure servings of foods. Use a kitchen scale to measure portions of protein foods.  · If told by your health care provider, avoid drinking too much fluid. Measure and count all liquids, including water, ice, soups, flavored gelatin, and frozen desserts such as ice pops or ice cream.  If you have diabetes:  · If you have diabetes (diabetes mellitus) and CKD, it is important to keep your blood sugar (glucose) in the target range recommended by your health care provider. Follow your diabetes management plan. This may include:  ? Checking your blood glucose regularly.  ? Taking medicines by mouth, taking insulin, or taking both.  ? Exercising for at least 30 minutes on 5 or more days each week, or as told by your health care provider.  ? Tracking how many servings of carbohydrates you eat at each meal.  · You may be given specific guidelines on how much of certain foods and nutrients you may eat, depending on your stage of kidney disease and whether you have high blood pressure (hypertension). Follow your meal plan as told by your dietitian.  What nutrients should I limit?  Work with your health care provider and dietitian to develop a meal plan that is right for you. Foods you can eat and foods you should limit or avoid will depend on the stage of your kidney disease and any other health conditions you have. The items listed below are not a complete list. Talk with your dietitian about what dietary choices are best for you.  Potassium  Potassium affects how steadily your heart beats. If too much potassium builds up in your blood, the potassium can cause an irregular heartbeat or even a heart attack.  You may need to limit or avoid foods that are high in potassium, such as:  · Milk and soy milk.  · Fruits, such as bananas, apricots, nectarines, melon, prunes, raisins, kiwi, and oranges.  · Vegetables, such as potatoes, sweet potatoes, yams, tomatoes, leafy greens, beets, avocado,  pumpkin, and winter squash.  · White and lima beans.  · Whole-wheat breads and pastas.  · Beans and nuts.  Phosphorus  Phosphorus is a mineral found in your bones. A balance between calcium and phosphorus is needed to build and maintain healthy bones. Too much phosphorus pulls calcium from your bones. This can make your bones weak and more likely to break. Too much phosphorus can also make your skin itch.  You may need to limit or avoid foods that are high in phosphorus, such as:  · Milk and dairy products.  · Dried beans and peas.  · Tofu, soy milk, and other soy-based meat replacements.  · Dark-colored sodas.  · Nuts and peanut butter.  · Meat, poultry, and fish.  · Bran cereals and oatmeal.  Protein    Protein helps you make and keep muscle. It also helps to repair your body's cells and tissues. One of the natural breakdown products of protein is a waste product called urea. When your kidneys are not working properly, they cannot remove wastes, such as urea. Reducing how much protein you eat can help prevent a buildup of urea in your blood.  Depending on your stage of kidney disease, you may need to limit foods that are high in protein. Sources of animal protein include:  · Meat (all types).  · Fish and seafood.  · Poultry.  · Eggs.  · Dairy.  Other protein foods include:  · Beans and legumes.  · Nuts and nut butter.  · Soy and tofu.    Sodium  Sodium helps to maintain a healthy balance of fluids in your body. Too much sodium can increase your blood pressure and have a negative effect on your heart and lungs. Too much sodium can also cause your body to retain too much fluid, making your kidneys work harder.  Most people should have less than 2,300 mg of sodium each day. If you have hypertension, you may need to limit your sodium to 1,500 mg each day.  You may need to limit or avoid foods that are high in sodium, such as:  · Salt seasonings.  · Soy sauce.  · Cured and processed meats.  · Salted crackers and snack  foods.  · Fast food.  · Canned soups and most canned foods.  · Pickled foods.  · Vegetable juice.  · Boxed mixes or ready-to-eat boxed meals and side dishes.  · Bottled dressings, sauces, and marinades.  Talk with your dietitian about how much potassium, phosphorus, protein, and sodium you may have each day.  Summary  · Chronic kidney disease (CKD) can lead to a buildup of waste and extra substances in the body. Certain foods lead to a buildup of these substances. By changing your diet as told, you can help prevent more kidney damage and delay or prevent the need for dialysis.  · Food intake changes are different for each person with CKD. Work with a dietitian to set up nutrient goals and a meal plan that is right for you.  · If you have diabetes and CKD, it is important to keep your blood sugar in the target range recommended by your health care provider.  This information is not intended to replace advice given to you by your health care provider. Make sure you discuss any questions you have with your health care provider.  Document Revised: 04/12/2021 Document Reviewed: 04/12/2021  Elsevier Patient Education © 2021 [employer]

## 2021-10-28 LAB
BUN SERPL-MCNC: 42 MG/DL (ref 8–23)
BUN/CREAT SERPL: 26.9 (ref 7–25)
CALCIUM SERPL-MCNC: 9 MG/DL (ref 8.6–10.5)
CHLORIDE SERPL-SCNC: 116 MMOL/L (ref 98–107)
CO2 SERPL-SCNC: 13.1 MMOL/L (ref 22–29)
CREAT SERPL-MCNC: 1.56 MG/DL (ref 0.57–1)
GLUCOSE SERPL-MCNC: 88 MG/DL (ref 65–99)
POTASSIUM SERPL-SCNC: 3.8 MMOL/L (ref 3.5–5.2)
SODIUM SERPL-SCNC: 144 MMOL/L (ref 136–145)
VIT B12 SERPL-MCNC: 259 PG/ML (ref 211–946)

## 2021-10-29 ENCOUNTER — TELEPHONE (OUTPATIENT)
Dept: INTERNAL MEDICINE | Facility: CLINIC | Age: 86
End: 2021-10-29

## 2021-10-29 NOTE — TELEPHONE ENCOUNTER
Caller: HAMILTON PARKER    Relationship: Child    Best call back number: [phone]    What is the best time to reach you: ANYTIME    Who are you requesting to speak with (clinical staff, provider,  specific staff member): CLINICAL STAFF    Do you know the name of the person who called: DAUGHTER    What was the call regarding: LAB RESULTS - PATIENT WILL RETURN FOR LABS ON 11/3/21    Do you require a callback: YES      PATIENT AND DAUGHTER, HAMILTON WOULD LIKE A CALL BACK TODAY

## 2021-10-30 LAB
BACTERIA UR CULT: ABNORMAL
BACTERIA UR CULT: ABNORMAL
OTHER ANTIBIOTIC SUSC ISLT: ABNORMAL

## 2021-11-01 ENCOUNTER — TELEPHONE (OUTPATIENT)
Dept: INTERNAL MEDICINE | Facility: CLINIC | Age: 86
End: 2021-11-01

## 2021-11-01 NOTE — TELEPHONE ENCOUNTER
----- Message from CHRISTIANO Romero sent at 11/1/2021  7:48 AM CDT -----  UTI present finish antibiotics

## 2021-11-01 NOTE — TELEPHONE ENCOUNTER
Called patient to discuss UTI culture results. She voiced understanding and will finish antibiotics.

## 2021-11-02 ENCOUNTER — READMISSION MANAGEMENT (OUTPATIENT)
Dept: CALL CENTER | Facility: HOSPITAL | Age: 86
End: 2021-11-02

## 2021-11-02 NOTE — OUTREACH NOTE
Medical Week 2 Survey      Responses   [facility] patient discharged from? [place]   Does the patient have one of the following disease processes/diagnoses(primary or secondary)? Other   Week 2 attempt successful? Yes   Call start time 1511   Discharge diagnosis A-fib RVR, CKD, hypokalemia, failure to thrive,    Call end time 1515   Meds reviewed with patient/caregiver? Yes   Is the patient having any side effects they believe may be caused by any medication additions or changes? No   Does the patient have all medications ordered at discharge? Yes   Is the patient taking all medications as directed (includes completed medication regime)? Yes   Comments regarding appointments Nephrology follow up next week. 2nd follow up with Dr. Rubio 11/10/21   Has the patient kept scheduled appointments due by today? Yes   Psychosocial issues? No   What is the patient's perception of their health status since discharge? Improving   Is the patient/caregiver able to teach back the hierarchy of who to call/visit for symptoms/problems? PCP, Specialist, Home health nurse, Urgent Care, ED, 911 Yes   Week 2 Call Completed? Yes          Iraida Shahid RN

## 2021-11-03 ENCOUNTER — CLINICAL SUPPORT (OUTPATIENT)
Dept: INTERNAL MEDICINE | Facility: CLINIC | Age: 86
End: 2021-11-03

## 2021-11-03 DIAGNOSIS — D51.0 PERNICIOUS ANEMIA: Primary | ICD-10-CM

## 2021-11-03 PROCEDURE — 96372 THER/PROPH/DIAG INJ SC/IM: CPT | Performed by: INTERNAL MEDICINE

## 2021-11-03 RX ORDER — CYANOCOBALAMIN 1000 UG/ML
1000 INJECTION, SOLUTION INTRAMUSCULAR; SUBCUTANEOUS
Status: SHIPPED | OUTPATIENT
Start: 2021-11-03

## 2021-11-03 RX ADMIN — CYANOCOBALAMIN 1000 MCG: 1000 INJECTION, SOLUTION INTRAMUSCULAR; SUBCUTANEOUS at 13:22

## 2021-11-04 ENCOUNTER — TELEPHONE (OUTPATIENT)
Dept: INTERNAL MEDICINE | Facility: CLINIC | Age: 86
End: 2021-11-04

## 2021-11-04 LAB
BUN SERPL-MCNC: 19 MG/DL (ref 8–23)
BUN/CREAT SERPL: 13.3 (ref 7–25)
CALCIUM SERPL-MCNC: 8.9 MG/DL (ref 8.6–10.5)
CHLORIDE SERPL-SCNC: 107 MMOL/L (ref 98–107)
CO2 SERPL-SCNC: 19.9 MMOL/L (ref 22–29)
CREAT SERPL-MCNC: 1.43 MG/DL (ref 0.57–1)
GLUCOSE SERPL-MCNC: 109 MG/DL (ref 65–99)
POTASSIUM SERPL-SCNC: 3.6 MMOL/L (ref 3.5–5.2)
SODIUM SERPL-SCNC: 137 MMOL/L (ref 136–145)

## 2021-11-04 NOTE — TELEPHONE ENCOUNTER
----- Message from CHRISTIANO Romero sent at 11/4/2021  7:40 AM CDT -----  Renal function has improved. Continue current hydration.

## 2021-11-09 ENCOUNTER — READMISSION MANAGEMENT (OUTPATIENT)
Dept: CALL CENTER | Facility: HOSPITAL | Age: 86
End: 2021-11-09

## 2021-11-10 ENCOUNTER — OFFICE VISIT (OUTPATIENT)
Dept: INTERNAL MEDICINE | Facility: CLINIC | Age: 86
End: 2021-11-10

## 2021-11-10 VITALS
WEIGHT: 132 LBS | DIASTOLIC BLOOD PRESSURE: 58 MMHG | HEART RATE: 60 BPM | HEIGHT: 60 IN | OXYGEN SATURATION: 98 % | SYSTOLIC BLOOD PRESSURE: 128 MMHG | BODY MASS INDEX: 25.91 KG/M2 | TEMPERATURE: 97.9 F

## 2021-11-10 DIAGNOSIS — N39.0 CHRONIC URINARY TRACT INFECTION: Primary | ICD-10-CM

## 2021-11-10 DIAGNOSIS — I10 HYPERTENSION, BENIGN: ICD-10-CM

## 2021-11-10 DIAGNOSIS — Z86.79 ATRIAL FIBRILLATION, CURRENTLY IN SINUS RHYTHM: ICD-10-CM

## 2021-11-10 PROCEDURE — 96372 THER/PROPH/DIAG INJ SC/IM: CPT | Performed by: INTERNAL MEDICINE

## 2021-11-10 PROCEDURE — 99214 OFFICE O/P EST MOD 30 MIN: CPT | Performed by: INTERNAL MEDICINE

## 2021-11-10 RX ORDER — AMOXICILLIN 250 MG/1
250 CAPSULE ORAL DAILY
Qty: 90 CAPSULE | Refills: 2 | Status: SHIPPED | OUTPATIENT
Start: 2021-11-10 | End: 2022-07-15

## 2021-11-10 RX ORDER — PREDNISONE 20 MG/1
20 TABLET ORAL DAILY
COMMUNITY
End: 2022-01-26

## 2021-11-10 RX ADMIN — CYANOCOBALAMIN 1000 MCG: 1000 INJECTION, SOLUTION INTRAMUSCULAR; SUBCUTANEOUS at 11:55

## 2021-11-10 NOTE — OUTREACH NOTE
Medical Week 3 Survey      Responses   [facility] patient discharged from? [place]   Does the patient have one of the following disease processes/diagnoses(primary or secondary)? Other   Week 3 attempt successful? Yes   Call start time 1858   Call end time 1900   Discharge diagnosis A-fib RVR, CKD, hypokalemia, failure to thrive,    Meds reviewed with patient/caregiver? Yes   Is the patient taking all medications as directed (includes completed medication regime)? Yes   Has the patient kept scheduled appointments due by today? Yes   What is the patient's perception of their health status since discharge? Improving   Week 3 Call Completed? Yes   Graduated Yes   Is the patient interested in additional calls from an ambulatory ?  NOTE:  applies to high risk patients requiring additional follow-up. No   Did the patient feel the follow up calls were helpful during their recovery period? Yes   Was the number of calls appropriate? Yes   Graduated/Revoked comments Goals met   Wrap up additional comments Seeing Jenni Rubio and Candis tomorrow.  Doing much better and no complaints.            Gladis Smart RN

## 2021-11-10 NOTE — PROGRESS NOTES
Subjective   Alka Nicolas is a 86 y.o. female.   Chief Complaint   Patient presents with   • Follow-up     4 WEEK FOLLOW UP; pt last seen on 10/13/2021; pt was also in the hospital at [facility] d/c on 10/24/2021 for afib    • medication     pt was changed from simvastatin 20 to atorvastatin 10  and wants to know which on you think she should be taking        History of Present Illness patient is here with her daughter to follow-up on recent hospitalization for atrial fib with rapid ventricular response.  Patient cardioverted with Cardizem drip she is been maintained in a good rhythm at this point she is doing well.  Patient also has had multiple urinary tract infections over the last year she has not been on suppressive therapy.  The following portions of the patient's history were reviewed and updated as appropriate: allergies, current medications, past family history, past medical history, past social history, past surgical history and problem list.    Review of Systems   Constitutional: Negative for activity change, appetite change, fatigue, fever, unexpected weight gain and unexpected weight loss.   HENT: Negative for swollen glands, trouble swallowing and voice change.    Eyes: Negative for blurred vision and visual disturbance.   Respiratory: Negative for cough and shortness of breath.    Cardiovascular: Negative for chest pain, palpitations and leg swelling.   Gastrointestinal: Negative for abdominal pain, constipation, diarrhea, nausea, vomiting and indigestion.   Endocrine: Negative for cold intolerance, heat intolerance, polydipsia and polyphagia.   Genitourinary: Negative for dysuria and frequency.   Musculoskeletal: Negative for arthralgias, back pain, joint swelling and neck pain.   Skin: Negative for color change, rash and skin lesions.   Neurological: Negative for dizziness, weakness, headache, memory problem and confusion.   Hematological: Does not bruise/bleed easily.    Psychiatric/Behavioral: Negative for agitation, hallucinations and suicidal ideas. The patient is not nervous/anxious.        Objective   Past Medical History:   Diagnosis Date   • Abdominal aortic aneurysm (HCC)    • Angina pectoris (HCC)    • Atherosclerosis of native coronary artery of native heart without angina pectoris    • CAD (coronary artery disease)    • Esophageal reflux    • GERD (gastroesophageal reflux disease)    • History of PTCA    • Hyperlipidemia    • Hypertension    • Hypertension, essential, benign    • Tobacco use disorder       Past Surgical History:   Procedure Laterality Date   • APPENDECTOMY     • CARDIAC CATHETERIZATION     • CARDIAC CATHETERIZATION Left 10/21/2020    Procedure: Cardiac Catheterization/Vascular Study;  Surgeon: Marcos Ratliff MD;  Location:  [facility] CATH [facility]VASIVE LOC[facility];  Service: Cardiology;  Laterality: Left;   • CHOLECYSTECTOMY     • HYSTERECTOMY      total        Current Outpatient Medications:   •  albuterol sulfate  (90 Base) MCG/ACT inhaler, Inhale 2 puffs 4 (Four) Times a Day., Disp: 6.7 g, Rfl: 5  •  apixaban (ELIQUIS) 2.5 MG tablet tablet, Take 1 tablet by mouth Every 12 (Twelve) Hours. Indications: Atrial Fibrillation, Disp: 60 tablet, Rfl: 0  •  atorvastatin (LIPITOR) 10 MG tablet, Take 1 tablet by mouth Daily., Disp: 30 tablet, Rfl: 0  •  betamethasone dipropionate (DIPROLENE) 0.05 % ointment, Apply 1 application topically to the appropriate area as directed Daily As Needed., Disp: , Rfl:   •  clobetasol (TEMOVATE) 0.05 % cream, Apply  topically to the appropriate area as directed 2 (Two) Times a Day., Disp: , Rfl:   •  dilTIAZem CD (CARDIZEM CD) 120 MG 24 hr capsule, Take 1 capsule by mouth Daily., Disp: 30 capsule, Rfl: 0  •  fluticasone-salmeterol (Advair Diskus) 250-50 MCG/DOSE DISKUS, Inhale 1 puff 2 (Two) Times a Day., Disp: 1 each, Rfl: 5  •  HYDROcodone-acetaminophen (NORCO) 5-325 MG per tablet, Take 1 tablet by mouth Every 6 (Six)  Hours As Needed for Severe Pain ., Disp: 20 tablet, Rfl: 0  •  Hydrocortisone, Perianal, (ANUSOL-HC) 2.5 % rectal cream, Insert  into the rectum 2 (Two) Times a Day., Disp: , Rfl:   •  levothyroxine (SYNTHROID, LEVOTHROID) 88 MCG tablet, Take 88 mcg by mouth Daily., Disp: , Rfl:   •  metoprolol succinate XL (TOPROL-XL) 25 MG 24 hr tablet, Take 1 tablet by mouth Daily., Disp: 90 tablet, Rfl: 3  •  Multiple Vitamins-Minerals (PRESERVISION AREDS 2+MULTI VIT PO), Take 1 capsule by mouth Daily., Disp: , Rfl:   •  predniSONE (DELTASONE) 20 MG tablet, Take 20 mg by mouth Daily., Disp: , Rfl:   •  sodium bicarbonate 325 MG tablet, Take 1 tablet by mouth 2 (Two) Times a Day., Disp: 20 tablet, Rfl: 0  •  amoxicillin (AMOXIL) 250 MG capsule, Take 1 capsule by mouth Daily., Disp: 90 capsule, Rfl: 2    Current Facility-Administered Medications:   •  cyanocobalamin injection 1,000 mcg, 1,000 mcg, Intramuscular, Q28 Days, Alexis Rubio MD, 1,000 mcg at 11/03/21 1322      Vitals:    11/10/21 1038   BP: 128/58   Pulse: 60   Temp: 97.9 °F (36.6 °C)   SpO2: 98%         11/10/21  1038   Weight: 59.9 kg (132 lb)       Body mass index is 25.78 kg/m².    Physical Exam  Constitutional:       Appearance: She is well-developed.   HENT:      Head: Normocephalic and atraumatic.   Eyes:      Pupils: Pupils are equal, round, and reactive to light.   Cardiovascular:      Rate and Rhythm: Normal rate and regular rhythm.   Pulmonary:      Effort: Pulmonary effort is normal.      Breath sounds: Normal breath sounds.   Abdominal:      General: Bowel sounds are normal.      Palpations: Abdomen is soft.   Musculoskeletal:         General: Normal range of motion.      Cervical back: Normal range of motion and neck supple.   Skin:     General: Skin is warm and dry.   Neurological:      Mental Status: She is alert and oriented to person, place, and time.   Psychiatric:         Behavior: Behavior normal.               Assessment/Plan   Diagnoses and  all orders for this visit:    1. Chronic urinary tract infection (Primary)    2. Hypertension, benign    3. Atrial fibrillation, currently in sinus rhythm    Other orders  -     amoxicillin (AMOXIL) 250 MG capsule; Take 1 capsule by mouth Daily.  Dispense: 90 capsule; Refill: 2    This point I am placing patient on amoxicillin 250 mg daily as suppression for recurrent urinary tract infections.  She is now converted from atrial fib back to sinus rhythm.  Apparently she has a large amount simvastatin at home and wants to finish up using that before filling her atorvastatin since she is on a very low dosage I think that is appropriate though I did encourage her to go ahead and make the change when she is out of her simvastatin.

## 2021-11-17 ENCOUNTER — CLINICAL SUPPORT (OUTPATIENT)
Dept: INTERNAL MEDICINE | Facility: CLINIC | Age: 86
End: 2021-11-17

## 2021-11-17 DIAGNOSIS — D51.0 PERNICIOUS ANEMIA: Primary | ICD-10-CM

## 2021-11-17 PROCEDURE — 96372 THER/PROPH/DIAG INJ SC/IM: CPT | Performed by: INTERNAL MEDICINE

## 2021-11-17 RX ADMIN — CYANOCOBALAMIN 1000 MCG: 1000 INJECTION, SOLUTION INTRAMUSCULAR; SUBCUTANEOUS at 11:38

## 2021-12-16 ENCOUNTER — CLINICAL SUPPORT (OUTPATIENT)
Dept: INTERNAL MEDICINE | Facility: CLINIC | Age: 86
End: 2021-12-16

## 2021-12-16 DIAGNOSIS — D51.0 PERNICIOUS ANEMIA: ICD-10-CM

## 2021-12-16 PROCEDURE — 96372 THER/PROPH/DIAG INJ SC/IM: CPT | Performed by: INTERNAL MEDICINE

## 2021-12-16 RX ADMIN — CYANOCOBALAMIN 1000 MCG: 1000 INJECTION, SOLUTION INTRAMUSCULAR; SUBCUTANEOUS at 16:00

## 2022-01-26 ENCOUNTER — OFFICE VISIT (OUTPATIENT)
Dept: CARDIOLOGY | Facility: CLINIC | Age: 87
End: 2022-01-26

## 2022-01-26 VITALS
BODY MASS INDEX: 24.94 KG/M2 | SYSTOLIC BLOOD PRESSURE: 143 MMHG | DIASTOLIC BLOOD PRESSURE: 70 MMHG | HEIGHT: 60 IN | WEIGHT: 127 LBS | HEART RATE: 57 BPM

## 2022-01-26 DIAGNOSIS — I25.10 ATHEROSCLEROSIS OF NATIVE CORONARY ARTERY OF NATIVE HEART WITHOUT ANGINA PECTORIS: ICD-10-CM

## 2022-01-26 DIAGNOSIS — I48.0 PAROXYSMAL ATRIAL FIBRILLATION: Primary | ICD-10-CM

## 2022-01-26 DIAGNOSIS — E78.2 MIXED HYPERLIPIDEMIA: ICD-10-CM

## 2022-01-26 DIAGNOSIS — I10 HYPERTENSION, BENIGN: ICD-10-CM

## 2022-01-26 PROBLEM — I48.91 ATRIAL FIBRILLATION WITH RVR: Status: RESOLVED | Noted: 2021-10-22 | Resolved: 2022-01-26

## 2022-01-26 PROCEDURE — 99213 OFFICE O/P EST LOW 20 MIN: CPT | Performed by: INTERNAL MEDICINE

## 2022-01-26 PROCEDURE — 93000 ELECTROCARDIOGRAM COMPLETE: CPT | Performed by: INTERNAL MEDICINE

## 2022-01-26 NOTE — PROGRESS NOTES
"Subjective    Alka Nicolas is a 86 y.o. female. Fu of ihd and risks    History of Present Illness     IHD(trivial per cath 10/20):  She is active for her age and has no exertional cp or unusual soa. EKG today is nsr and ow nsc. She is adherent to meds.    HTN:  Home readings are \"130's/60's\"    HLD:  Is on a low potent statin. Lipids show LDL=54 on 6/15/21    PAR AFIB:  First ever 10/21 and converted spontaneosly. Was noted to have new renal insuff with met acidosis and is now seeing Dr Chirinos with Nephrology. She is on Bicarb now.      The following portions of the patient's history were reviewed and updated as appropriate: allergies, current medications, past family history, past medical history, past social history, past surgical history and problem list.    Patient Active Problem List   Diagnosis   • Atherosclerosis of native coronary artery of native heart without angina pectoris   • Hypertension, benign   • Hyperlipidemia   • Palpitations   • Hypothyroidism (acquired)   • Psoriasis   • Vitamin B12 deficiency   • Grade 1 out of 6 intensity murmur   • Unstable angina (HCC)   • COPD, mild (HCC)   • Osteoarthritis   • Pernicious anemia   • Acute kidney injury (HCC)   • Hypokalemia   • Metabolic acidosis   • Acute cystitis   • Pneumonitis   • Chronic kidney disease, stage IV (severe) (HCC)   • Elevated troponin   • Failure to thrive in adult   • Moderate malnutrition (CMS/HCC)   • Paroxysmal atrial fibrillation (HCC)       No Known Allergies    Family History   Problem Relation Age of Onset   • Diabetes Mother    • Heart disease Father    • Hyperlipidemia Father    • Hypertension Father    • Stroke Father    • Breast cancer Neg Hx        Social History     Socioeconomic History   • Marital status:    Tobacco Use   • Smoking status: Former Smoker     Packs/day: 0.50     Types: Cigarettes     Quit date: 2020     Years since quittin.8   • Smokeless tobacco: Never Used   Substance and Sexual " Activity   • Alcohol use: No   • Drug use: No   • Sexual activity: Not Currently     Partners: Male         Current Outpatient Medications:   •  albuterol sulfate  (90 Base) MCG/ACT inhaler, Inhale 2 puffs 4 (Four) Times a Day., Disp: 6.7 g, Rfl: 5  •  amoxicillin (AMOXIL) 250 MG capsule, Take 1 capsule by mouth Daily., Disp: 90 capsule, Rfl: 2  •  apixaban (ELIQUIS) 2.5 MG tablet tablet, Take 1 tablet by mouth Every 12 (Twelve) Hours. Indications: Atrial Fibrillation, Disp: 60 tablet, Rfl: 0  •  atorvastatin (LIPITOR) 10 MG tablet, Take 1 tablet by mouth Daily., Disp: 30 tablet, Rfl: 0  •  betamethasone dipropionate (DIPROLENE) 0.05 % ointment, Apply 1 application topically to the appropriate area as directed Daily As Needed., Disp: , Rfl:   •  clobetasol (TEMOVATE) 0.05 % cream, Apply  topically to the appropriate area as directed 2 (Two) Times a Day., Disp: , Rfl:   •  dilTIAZem CD (CARDIZEM CD) 120 MG 24 hr capsule, Take 1 capsule by mouth Daily., Disp: 30 capsule, Rfl: 0  •  fluticasone-salmeterol (Advair Diskus) 250-50 MCG/DOSE DISKUS, Inhale 1 puff 2 (Two) Times a Day., Disp: 1 each, Rfl: 5  •  HYDROcodone-acetaminophen (NORCO) 5-325 MG per tablet, Take 1 tablet by mouth Every 6 (Six) Hours As Needed for Severe Pain ., Disp: 20 tablet, Rfl: 0  •  Hydrocortisone, Perianal, (ANUSOL-HC) 2.5 % rectal cream, Insert  into the rectum 2 (Two) Times a Day., Disp: , Rfl:   •  levothyroxine (SYNTHROID, LEVOTHROID) 88 MCG tablet, Take 88 mcg by mouth Daily., Disp: , Rfl:   •  metoprolol succinate XL (TOPROL-XL) 25 MG 24 hr tablet, Take 1 tablet by mouth Daily., Disp: 90 tablet, Rfl: 3  •  Multiple Vitamins-Minerals (PRESERVISION AREDS 2+MULTI VIT PO), Take 1 capsule by mouth Daily., Disp: , Rfl:   •  sodium bicarbonate 325 MG tablet, Take 1 tablet by mouth 2 (Two) Times a Day., Disp: 20 tablet, Rfl: 0    Current Facility-Administered Medications:   •  cyanocobalamin injection 1,000 mcg, 1,000 mcg, Intramuscular,  "Q28 Days, Alexis Rubio MD, 1,000 mcg at 12/16/21 1600    Past Surgical History:   Procedure Laterality Date   • APPENDECTOMY     • CARDIAC CATHETERIZATION     • CARDIAC CATHETERIZATION Left 10/21/2020    Procedure: Cardiac Catheterization/Vascular Study;  Surgeon: Marcos Ratliff MD;  Location:  [facility] CATH INVAS[facility] LOC[facility];  Service: Cardiology;  Laterality: Left;   • CHOLECYSTECTOMY     • HYSTERECTOMY      total       Review of Systems   Constitutional: Negative for activity change, appetite change, diaphoresis and unexpected weight change.   Respiratory: Negative for shortness of breath.         Chest tightness tx with inhaler   Cardiovascular: Negative for chest pain, palpitations and leg swelling.   Gastrointestinal: Negative for abdominal pain and blood in stool.   Genitourinary: Negative for difficulty urinating and hematuria.       /70   Pulse 57   Ht 152.4 cm (60\")   Wt 57.6 kg (127 lb)   BMI 24.80 kg/m²   Procedures    Objective   Physical Exam  Constitutional:       Appearance: Normal appearance.   Cardiovascular:      Rate and Rhythm: Normal rate and regular rhythm.      Pulses: Normal pulses.      Heart sounds: Normal heart sounds. No murmur heard.  No friction rub. No gallop.    Pulmonary:      Effort: Pulmonary effort is normal.      Breath sounds: Normal breath sounds. No wheezing or rales.   Abdominal:      General: Bowel sounds are normal.      Tenderness: There is no abdominal tenderness.   Musculoskeletal:      Right lower leg: No edema.      Left lower leg: No edema.   Skin:     General: Skin is warm and dry.   Neurological:      General: No focal deficit present.      Mental Status: She is oriented to person, place, and time.   Psychiatric:         Mood and Affect: Mood normal.         Behavior: Behavior normal.         Assessment/Plan   Diagnoses and all orders for this visit:    1. Paroxysmal atrial fibrillation (HCC) (Primary)  Comments:  maintaining sr and on DOAC " ok    2. Hypertension, benign  Comments:  controlled    3. Mixed hyperlipidemia  Comments:  good LDL control    4. Atherosclerosis of native coronary artery of native heart without angina pectoris  Comments:  remote mid RCA stent and last cath is clear                 Return in about 6 months (around 7/26/2022) for Next scheduled follow up.  No orders of the defined types were placed in this encounter.

## 2022-03-25 ENCOUNTER — OFFICE VISIT (OUTPATIENT)
Dept: INTERNAL MEDICINE | Facility: CLINIC | Age: 87
End: 2022-03-25

## 2022-03-25 VITALS
HEIGHT: 60 IN | WEIGHT: 132 LBS | HEART RATE: 42 BPM | SYSTOLIC BLOOD PRESSURE: 150 MMHG | BODY MASS INDEX: 25.91 KG/M2 | TEMPERATURE: 96.9 F | DIASTOLIC BLOOD PRESSURE: 70 MMHG | OXYGEN SATURATION: 96 %

## 2022-03-25 DIAGNOSIS — K92.1 BLACK STOOLS: ICD-10-CM

## 2022-03-25 DIAGNOSIS — N18.4 CHRONIC KIDNEY DISEASE, STAGE IV (SEVERE): ICD-10-CM

## 2022-03-25 DIAGNOSIS — R42 DIZZY: ICD-10-CM

## 2022-03-25 DIAGNOSIS — I10 HYPERTENSION, BENIGN: Primary | ICD-10-CM

## 2022-03-25 DIAGNOSIS — I48.0 PAROXYSMAL ATRIAL FIBRILLATION: ICD-10-CM

## 2022-03-25 DIAGNOSIS — K64.1 GRADE II HEMORRHOIDS: ICD-10-CM

## 2022-03-25 LAB
BILIRUB BLD-MCNC: NEGATIVE MG/DL
CLARITY, POC: CLEAR
COLOR UR: YELLOW
DEVELOPER EXPIRATION DATE: NORMAL
DEVELOPER LOT NUMBER: NORMAL
EXPIRATION DATE: NORMAL
FECAL OCCULT BLOOD SCREEN, POC: NEGATIVE
GLUCOSE UR STRIP-MCNC: NEGATIVE MG/DL
KETONES UR QL: NEGATIVE
LEUKOCYTE EST, POC: ABNORMAL
Lab: NORMAL
NEGATIVE CONTROL: NEGATIVE
NITRITE UR-MCNC: NEGATIVE MG/ML
PH UR: 7 [PH] (ref 5–8)
POSITIVE CONTROL: POSITIVE
PROT UR STRIP-MCNC: NEGATIVE MG/DL
RBC # UR STRIP: NEGATIVE /UL
SP GR UR: 1.01 (ref 1–1.03)
UROBILINOGEN UR QL: NORMAL

## 2022-03-25 PROCEDURE — 82270 OCCULT BLOOD FECES: CPT | Performed by: NURSE PRACTITIONER

## 2022-03-25 PROCEDURE — 93000 ELECTROCARDIOGRAM COMPLETE: CPT | Performed by: NURSE PRACTITIONER

## 2022-03-25 PROCEDURE — 99214 OFFICE O/P EST MOD 30 MIN: CPT | Performed by: NURSE PRACTITIONER

## 2022-03-25 PROCEDURE — 81003 URINALYSIS AUTO W/O SCOPE: CPT | Performed by: NURSE PRACTITIONER

## 2022-03-25 RX ORDER — LOSARTAN POTASSIUM 25 MG/1
25 TABLET ORAL DAILY
Qty: 30 TABLET | Refills: 0 | Status: CANCELLED | OUTPATIENT
Start: 2022-03-25

## 2022-03-25 RX ORDER — CLONIDINE HYDROCHLORIDE 0.1 MG/1
0.1 TABLET ORAL DAILY PRN
COMMUNITY

## 2022-03-25 RX ORDER — POTASSIUM CHLORIDE 750 MG/1
10 TABLET, FILM COATED, EXTENDED RELEASE ORAL DAILY
COMMUNITY

## 2022-03-25 RX ORDER — SIMVASTATIN 40 MG
40 TABLET ORAL NIGHTLY
COMMUNITY
End: 2022-04-01

## 2022-03-25 NOTE — PROGRESS NOTES
"Chief Complaint  Hypertension (Patient has some old Clonidine that she took this morning.)    Subjective          Alka Nicolas presents to [facility]  Alka is a very pleasant 86-year-old female who presents the office today related to higher blood pressure readings in the last 3 to 4 days.  Patient states blood pressures have ranged from systolic of 1 16-1 93.  Patient reports that she had an old bottle of clonidine that she took to the pharmacy and they filled.  She started taking this twice daily for the last 4 days.  She reports since she started this medication her dizziness has worsened and her mouth is dry.  Patient's heart rate is slower today.  She denies persistent palpitations but is intermittently having a sensation of \"heart flipping and flopping \".  Patient denies chest pain, shortness of breath, or weight changes or lower extremity swelling.  She does admit to us today that she has been having some black stools for 1 month.  She does have chronic issues with hemorrhoids but is not seeing any danelle red blood in her stool.  She reports \"I have had an issue with being too dry in the past and needed IV fluids \".  Patient is in chronic kidney disease and admits to drinking only 3 to 4 glasses of water a day.  Patient is making appropriate conversation in the office today and answering questions appropriately.  Completed stroke assessment and negative for any acute findings.  She denies any Tanner tract symptoms but with her history of UTI infections did check her urine today UA did show some trace blood and trace leukocytes but she has no symptoms and she is on amoxicillin daily.    Hypertension  This is a chronic problem. The current episode started more than 1 year ago. The problem has been waxing and waning since onset. The problem is uncontrolled. Associated symptoms include headaches and palpitations. Pertinent negatives include no anxiety, blurred vision, chest " "pain, malaise/fatigue, neck pain, peripheral edema, PND or shortness of breath. Agents associated with hypertension include thyroid hormones. Risk factors for coronary artery disease include dyslipidemia, obesity, post-menopausal state, stress and sedentary lifestyle. Past treatments include diuretics, lifestyle changes, beta blockers and calcium channel blockers. Current antihypertension treatment includes calcium channel blockers, lifestyle changes, beta blockers and central alpha agonists. The current treatment provides mild (however, bradycardia) improvement. Compliance problems include exercise.  Hypertensive end-organ damage includes kidney disease, CAD/MI and heart failure. Identifiable causes of hypertension include chronic renal disease, a hypertension causing med and a thyroid problem.       Objective   Vital Signs:   /70 (BP Location: Left arm, Patient Position: Sitting, Cuff Size: Adult)   Pulse (!) 42   Temp 96.9 °F (36.1 °C)   Ht 152.4 cm (60\")   Wt 59.9 kg (132 lb)   SpO2 96%   BMI 25.78 kg/m²       Physical Exam  Vitals and nursing note reviewed.   Constitutional:       Appearance: Normal appearance. She is well-developed, well-groomed and normal weight.   HENT:      Head: Normocephalic and atraumatic.      Right Ear: Hearing, tympanic membrane, ear canal and external ear normal.      Left Ear: Hearing, tympanic membrane, ear canal and external ear normal.      Nose: Nose normal.      Mouth/Throat:      Lips: Pink.      Mouth: Mucous membranes are dry.      Pharynx: Oropharynx is clear. Uvula midline.   Eyes:      General: Lids are normal. Lids are everted, no foreign bodies appreciated. Vision grossly intact.      Conjunctiva/sclera: Conjunctivae normal.      Pupils: Pupils are equal, round, and reactive to light.   Neck:      Thyroid: No thyromegaly.   Cardiovascular:      Rate and Rhythm: Regular rhythm. Bradycardia present.  No extrasystoles are present.     Pulses: Normal pulses.     "  Heart sounds: Normal heart sounds. No murmur heard.  Pulmonary:      Effort: Pulmonary effort is normal.      Breath sounds: Normal breath sounds.   Abdominal:      General: Abdomen is flat. Bowel sounds are normal.      Palpations: Abdomen is soft. There is no hepatomegaly, splenomegaly, mass or pulsatile mass.      Tenderness: There is no abdominal tenderness. There is no right CVA tenderness or left CVA tenderness.   Musculoskeletal:      Cervical back: Full passive range of motion without pain, normal range of motion and neck supple.      Right lower leg: No edema.      Left lower leg: No edema.   Lymphadenopathy:      Head:      Right side of head: No submental, submandibular, tonsillar, preauricular, posterior auricular or occipital adenopathy.      Left side of head: No submental, submandibular, tonsillar, preauricular, posterior auricular or occipital adenopathy.      Cervical: No cervical adenopathy.   Skin:     General: Skin is warm and dry.      Capillary Refill: Capillary refill takes less than 2 seconds.   Neurological:      General: No focal deficit present.      Mental Status: She is alert and oriented to person, place, and time.      Cranial Nerves: Cranial nerves are intact.      Sensory: Sensation is intact.      Motor: Motor function is intact.      Coordination: Coordination is intact.      Gait: Gait is intact.      Deep Tendon Reflexes: Reflexes are normal and symmetric.   Psychiatric:         Attention and Perception: Attention normal.         Mood and Affect: Mood normal.         Speech: Speech normal.         Behavior: Behavior normal. Behavior is cooperative.         Thought Content: Thought content normal.         Cognition and Memory: Cognition and memory normal.        Result Review :   The following data was reviewed by: CHRISTIANO Lau on 03/25/2022:  Common labs    Common Labsle 10/24/21 10/24/21 10/27/21 11/3/21    0425 0425     Glucose  110 (A) 88 109 (A)   BUN  26 (A) 42 (A)  19   Creatinine  1.66 (A) 1.56 (A) 1.43 (A)   eGFR Non African Am  29 (A) 31 (A) 35 (A)   eGFR  Am   38 (A) 42 (A)   Sodium  143 144 137   Potassium  3.2 (A) 3.8 3.6   Chloride  118 (A) 116 (A) 107   Calcium  8.9 9.0 8.9   WBC 9.99      Hemoglobin 10.6 (A)      Hematocrit 32.7 (A)      Platelets 192      (A) Abnormal value       Comments are available for some flowsheets but are not being displayed.           Data reviewed: Cardiology studies EKG reviewed and showed bradycardia and first degree AV block     Echocardiogram:  · Left ventricular wall thickness is consistent with mild eccentric hypertrophy.  · Left ventricular ejection fraction appears to be 56 - 60%. Left ventricular systolic function is normal.  · Left ventricular diastolic function was indeterminate.  · Left atrial volume is mildly increased.  · There is mild, anterior mitral leaflet thickening present.  · Mild mitral valve stenosis is present.  · There is a circumferential pericardial effusion.    ECG 12 Lead    Date/Time: 3/25/2022 4:24 PM  Performed by: Daisy Mae APRN  Authorized by: Daisy Mae APRN   Comparison: compared with previous ECG from 1/26/2022  Similar to previous ECG  Rhythm: sinus bradycardia  Conduction: 1st degree AV block  Other: no other findings    Clinical impression: abnormal EKG              Assessment and Plan    Diagnoses and all orders for this visit:    1. Hypertension, benign (Primary)  -     CBC & Differential  -     TSH  -     ECG 12 Lead    2. Chronic kidney disease, stage IV (severe) (HCC)  -     CBC & Differential  -     Basic Metabolic Panel  -     POC Urinalysis Dipstick, Multipro    3. Paroxysmal atrial fibrillation (HCC)  -     TSH    4. Dizzy  -     CBC & Differential  -     Basic Metabolic Panel  -     TSH  -     POCT occult blood x 1 stool  -     Occult Blood, Fecal By Immunoassay - Stool, Per Rectum  -     ECG 12 Lead    5. Black stools  -     CBC & Differential  -      POCT occult blood x 1 stool  -     Occult Blood, Fecal By Immunoassay - Stool, Per Rectum    6. Grade II hemorrhoids      I spent 30 minutes caring for Alka on this date of service. This time includes time spent by me in the following activities:preparing for the visit, reviewing tests, obtaining and/or reviewing a separately obtained history, performing a medically appropriate examination and/or evaluation , counseling and educating the patient/family/caregiver, ordering medications, tests, or procedures, referring and communicating with other health care professionals , documenting information in the medical record, independently interpreting results and communicating that information with the patient/family/caregiver and care coordination  Follow Up   Return in about 2 weeks (around 4/8/2022) for Recheck BP.     Patient's blood pressure readings to indicate some resistant hypertension.  However she started taking clonidine without our advice and heart rate is now pretty low and that is why she is most likely feeling dizzy and lightheaded.  Did advise patient that with her heart rate being so low to not take her second dose of clonidine that she had been taking.  Would like for her to recheck her blood pressure and heart rate first thing in the morning.  If heart rate is less than 60 would like her to take half of her metoprolol dose.  If patient blood pressure is greater than 180/90 she can take 1 clonidine.  And reassess her heart rate and blood pressure in 2 hours.  Also educated her if her symptoms worsen or change to chest pain shortness of breath increased frequency of palpitations she would need to go to the ER as soon as possible.  Will forward her EKG today to cardiology's office for review.  Did advise her that she will most likely need to see cardiology sooner than her next appointment scheduled.     However with her accounts of black tarry stools and symptoms would like to go ahead and repeat a  stool card on her at home.  Stool card in the office today was negative.  We will make sure that a CBC is completed as well to check her blood counts.  If hemoglobin is less than 7 would like to have her sent to the ER for further evaluation and possible blood.  Patient is not drinking enough water would like for her to increase her hydration up to 60 ounces of water a day.  We will check her kidney function to determine if she is significantly worse along with check her electrolytes such as sodium and potassium to determine if these are abnormal to make adjustments to her therapy as needed.    We will check her thyroid function due to dry mouth and accounts of increased hair loss.  She is currently taking Synthroid 88 mcg.  Patient reports taking medication as prescribed.    Continue using her topical therapy for hemorrhoids.  No thrombosis seen or anal fissures or infection noted on exam today.  No acute blood as well.    UA is negative for signs of infection.  Continue her amoxicillin 250 mg daily for prevention.      Patient was given instructions and counseling regarding her condition or for health maintenance advice. Please see specific information pulled into the AVS if appropriate.

## 2022-03-26 LAB
BASOPHILS # BLD AUTO: 0.03 10*3/MM3 (ref 0–0.2)
BASOPHILS NFR BLD AUTO: 0.4 % (ref 0–1.5)
BUN SERPL-MCNC: 29 MG/DL (ref 8–23)
BUN/CREAT SERPL: 22.1 (ref 7–25)
CALCIUM SERPL-MCNC: 9.1 MG/DL (ref 8.6–10.5)
CHLORIDE SERPL-SCNC: 102 MMOL/L (ref 98–107)
CO2 SERPL-SCNC: 23.4 MMOL/L (ref 22–29)
CREAT SERPL-MCNC: 1.31 MG/DL (ref 0.57–1)
EGFRCR SERPLBLD CKD-EPI 2021: 39.8 ML/MIN/1.73
EOSINOPHIL # BLD AUTO: 0.29 10*3/MM3 (ref 0–0.4)
EOSINOPHIL NFR BLD AUTO: 4.1 % (ref 0.3–6.2)
ERYTHROCYTE [DISTWIDTH] IN BLOOD BY AUTOMATED COUNT: 12.9 % (ref 12.3–15.4)
GLUCOSE SERPL-MCNC: 99 MG/DL (ref 65–99)
HCT VFR BLD AUTO: 35.4 % (ref 34–46.6)
HGB BLD-MCNC: 11.1 G/DL (ref 12–15.9)
IMM GRANULOCYTES # BLD AUTO: 0.02 10*3/MM3 (ref 0–0.05)
IMM GRANULOCYTES NFR BLD AUTO: 0.3 % (ref 0–0.5)
LYMPHOCYTES # BLD AUTO: 1.49 10*3/MM3 (ref 0.7–3.1)
LYMPHOCYTES NFR BLD AUTO: 21.2 % (ref 19.6–45.3)
MCH RBC QN AUTO: 28.5 PG (ref 26.6–33)
MCHC RBC AUTO-ENTMCNC: 31.4 G/DL (ref 31.5–35.7)
MCV RBC AUTO: 90.8 FL (ref 79–97)
MONOCYTES # BLD AUTO: 0.52 10*3/MM3 (ref 0.1–0.9)
MONOCYTES NFR BLD AUTO: 7.4 % (ref 5–12)
NEUTROPHILS # BLD AUTO: 4.67 10*3/MM3 (ref 1.7–7)
NEUTROPHILS NFR BLD AUTO: 66.6 % (ref 42.7–76)
NRBC BLD AUTO-RTO: 0 /100 WBC (ref 0–0.2)
PLATELET # BLD AUTO: 160 10*3/MM3 (ref 140–450)
POTASSIUM SERPL-SCNC: 4.9 MMOL/L (ref 3.5–5.2)
RBC # BLD AUTO: 3.9 10*6/MM3 (ref 3.77–5.28)
SODIUM SERPL-SCNC: 141 MMOL/L (ref 136–145)
TSH SERPL DL<=0.005 MIU/L-ACNC: 5.34 UIU/ML (ref 0.27–4.2)
WBC # BLD AUTO: 7.02 10*3/MM3 (ref 3.4–10.8)

## 2022-03-28 DIAGNOSIS — E03.9 HYPOTHYROIDISM (ACQUIRED): Primary | ICD-10-CM

## 2022-03-28 RX ORDER — LEVOTHYROXINE SODIUM 0.1 MG/1
100 TABLET ORAL DAILY
Qty: 90 TABLET | Refills: 0 | Status: SHIPPED | OUTPATIENT
Start: 2022-03-28

## 2022-03-28 NOTE — PROGRESS NOTES
How was her heart rate? And how is her blood pressure now?  Needs to try to use once daily as needed at this point in effort to get her heart rate up.  BUT can use up to twice daily if BP > 180/90.

## 2022-03-29 LAB — HEMOCCULT STL QL IA: NEGATIVE

## 2022-03-30 ENCOUNTER — TELEPHONE (OUTPATIENT)
Dept: CARDIOLOGY | Facility: CLINIC | Age: 87
End: 2022-03-30

## 2022-03-30 NOTE — TELEPHONE ENCOUNTER
Patient has called in today stating she has been noticing her BP is running high, she recently seen PCP NP about this issue. She is experiencing a head ache and ears ringing. Patient states she's been taking clonidine to lower BP, she states once it wears off it elevates back up. Below are BP readings provided by the patient:      143/ 69  54  194/ 72  58  184/70  57  195/75  52  176/72  52  144/80  61  154/77  52  173/86  52  163/86  56    Also, patient  Saw Daisy Mae and had EKG done on the 25th, please review this EKG (it is scanned under media tab)

## 2022-04-01 ENCOUNTER — OFFICE VISIT (OUTPATIENT)
Dept: CARDIOLOGY | Facility: CLINIC | Age: 87
End: 2022-04-01

## 2022-04-01 VITALS
WEIGHT: 132 LBS | BODY MASS INDEX: 25.91 KG/M2 | HEART RATE: 52 BPM | HEIGHT: 60 IN | SYSTOLIC BLOOD PRESSURE: 180 MMHG | DIASTOLIC BLOOD PRESSURE: 80 MMHG

## 2022-04-01 DIAGNOSIS — I48.0 PAROXYSMAL ATRIAL FIBRILLATION: ICD-10-CM

## 2022-04-01 DIAGNOSIS — I10 HYPERTENSION, BENIGN: Primary | ICD-10-CM

## 2022-04-01 DIAGNOSIS — I25.10 CORONARY ARTERY DISEASE INVOLVING NATIVE CORONARY ARTERY OF NATIVE HEART WITHOUT ANGINA PECTORIS: ICD-10-CM

## 2022-04-01 PROCEDURE — 93000 ELECTROCARDIOGRAM COMPLETE: CPT | Performed by: INTERNAL MEDICINE

## 2022-04-01 PROCEDURE — 99214 OFFICE O/P EST MOD 30 MIN: CPT | Performed by: INTERNAL MEDICINE

## 2022-04-01 RX ORDER — ATORVASTATIN CALCIUM 40 MG/1
40 TABLET, FILM COATED ORAL DAILY
Qty: 90 TABLET | Refills: 3 | Status: SHIPPED | OUTPATIENT
Start: 2022-04-01

## 2022-04-01 RX ORDER — CARVEDILOL 12.5 MG/1
12.5 TABLET ORAL 2 TIMES DAILY
Qty: 60 TABLET | Refills: 11 | Status: SHIPPED | OUTPATIENT
Start: 2022-04-01 | End: 2023-04-03

## 2022-04-01 RX ORDER — AMLODIPINE BESYLATE 5 MG/1
5 TABLET ORAL DAILY
Qty: 90 TABLET | Refills: 3 | Status: SHIPPED | OUTPATIENT
Start: 2022-04-01 | End: 2023-04-03

## 2022-04-01 NOTE — PROGRESS NOTES
"Subjective    Alka Salima Nicolas is a 86 y.o. female.  - is now having trouble controlling her bp    History of Present Illness     HTN:  She has been noting her bp going up in spite of adherence to meds. She has had no med changes or life-changes or new pain. She relates some mild HA's \"when the BP is up\". She has been using prn Clonidine for BP>180 and it dose work. Home readings per diary today are 144-195/. EKG today shows a pac and is ow nsc.    IHD:  No cp or unusual soa. Her statin has not been continued for unknown reasons and the importance of statin tx has been discussed - is willing to take a different one.    SICK SINUS SYNDROME - PAROXYSMAL ATRIAL FIB:  Her home monitor is showing HR in the 40's some and she is having more fatique. She is not having palpitations or spells or light-headedness. Recent labs show good BMP and CBC and mild TSH elevation with a subsequent increase in Synthroid.          The following portions of the patient's history were reviewed and updated as appropriate: allergies, current medications, past family history, past medical history, past social history, past surgical history and problem list.    Patient Active Problem List   Diagnosis   • CAD (coronary artery disease)   • Hypertension, benign   • Hyperlipidemia   • Palpitations   • Hypothyroidism (acquired)   • Psoriasis   • Vitamin B12 deficiency   • Grade 1 out of 6 intensity murmur   • Unstable angina (HCC)   • COPD, mild (HCC)   • Osteoarthritis   • Pernicious anemia   • Acute kidney injury (HCC)   • Hypokalemia   • Metabolic acidosis   • Acute cystitis   • Pneumonitis   • Chronic kidney disease, stage IV (severe) (HCC)   • Elevated troponin   • Failure to thrive in adult   • Moderate malnutrition (CMS/HCC)   • Paroxysmal atrial fibrillation (HCC)       No Known Allergies    Family History   Problem Relation Age of Onset   • Diabetes Mother    • Heart disease Father    • Hyperlipidemia Father    • Hypertension " Father    • Stroke Father    • Breast cancer Neg Hx        Social History     Socioeconomic History   • Marital status:    Tobacco Use   • Smoking status: Former Smoker     Packs/day: 0.50     Types: Cigarettes     Quit date: 2020     Years since quittin.0   • Smokeless tobacco: Never Used   Substance and Sexual Activity   • Alcohol use: No   • Drug use: No   • Sexual activity: Not Currently     Partners: Male         Current Outpatient Medications:   •  albuterol sulfate  (90 Base) MCG/ACT inhaler, Inhale 2 puffs 4 (Four) Times a Day., Disp: 6.7 g, Rfl: 5  •  amoxicillin (AMOXIL) 250 MG capsule, Take 1 capsule by mouth Daily., Disp: 90 capsule, Rfl: 2  •  apixaban (ELIQUIS) 2.5 MG tablet tablet, Take 1 tablet by mouth Every 12 (Twelve) Hours. Indications: Atrial Fibrillation, Disp: 60 tablet, Rfl: 0  •  betamethasone dipropionate (DIPROLENE) 0.05 % ointment, Apply 1 application topically to the appropriate area as directed Daily As Needed., Disp: , Rfl:   •  Cholecalciferol (Vitamin D3) 1.25 MG (09053 UT) tablet, Take  by mouth., Disp: , Rfl:   •  clobetasol (TEMOVATE) 0.05 % cream, Apply  topically to the appropriate area as directed 2 (Two) Times a Day., Disp: , Rfl:   •  cloNIDine (CATAPRES) 0.1 MG tablet, Take 0.1 mg by mouth Daily As Needed. For blood pressure > 180/90, Disp: , Rfl:   •  fluticasone-salmeterol (Advair Diskus) 250-50 MCG/DOSE DISKUS, Inhale 1 puff 2 (Two) Times a Day., Disp: 1 each, Rfl: 5  •  Hydrocortisone, Perianal, (ANUSOL-HC) 2.5 % rectal cream, Insert  into the rectum 2 (Two) Times a Day., Disp: , Rfl:   •  levothyroxine (SYNTHROID, LEVOTHROID) 100 MCG tablet, Take 1 tablet by mouth Daily., Disp: 90 tablet, Rfl: 0  •  Multiple Vitamins-Minerals (PRESERVISION AREDS 2+MULTI VIT PO), Take 1 capsule by mouth Daily., Disp: , Rfl:   •  potassium chloride 10 MEQ CR tablet, Take 10 mEq by mouth Daily., Disp: , Rfl:   •  sodium bicarbonate 325 MG tablet, Take 1 tablet by  "mouth 2 (Two) Times a Day., Disp: 20 tablet, Rfl: 0  •  amLODIPine (NORVASC) 5 MG tablet, Take 1 tablet by mouth Daily., Disp: 90 tablet, Rfl: 3  •  atorvastatin (LIPITOR) 40 MG tablet, Take 1 tablet by mouth Daily., Disp: 90 tablet, Rfl: 3  •  carvedilol (COREG) 12.5 MG tablet, Take 1 tablet by mouth 2 (Two) Times a Day., Disp: 60 tablet, Rfl: 11    Current Facility-Administered Medications:   •  cyanocobalamin injection 1,000 mcg, 1,000 mcg, Intramuscular, Q28 Days, Alexis Rubio MD, 1,000 mcg at 12/16/21 1600    Past Surgical History:   Procedure Laterality Date   • APPENDECTOMY     • CARDIAC CATHETERIZATION     • CARDIAC CATHETERIZATION Left 10/21/2020    Procedure: Cardiac Catheterization/Vascular Study;  Surgeon: Marcos Ratliff MD;  Location:  [facility] CATH INVASIVE LOCATION;  Service: Cardiology;  Laterality: Left;   • CHOLECYSTECTOMY     • HYSTERECTOMY      total       Review of Systems   Constitutional: Positive for fatigue. Negative for activity change, appetite change and unexpected weight change.   Respiratory: Positive for shortness of breath and wheezing.    Cardiovascular: Positive for palpitations. Negative for chest pain and leg swelling.   Gastrointestinal: Negative for abdominal pain and blood in stool.   Genitourinary: Negative for difficulty urinating and hematuria.   Musculoskeletal: Negative for arthralgias and back pain.       /80   Pulse 52   Ht 152.4 cm (60\")   Wt 59.9 kg (132 lb)   BMI 25.78 kg/m²   Procedures    Objective   Physical Exam  Constitutional:       Appearance: Normal appearance.   Cardiovascular:      Rate and Rhythm: Regular rhythm. Bradycardia present.      Pulses: Normal pulses.      Heart sounds: No murmur heard.    No friction rub. No gallop.   Pulmonary:      Effort: Pulmonary effort is normal.      Breath sounds: Normal breath sounds. No wheezing or rales.   Abdominal:      General: Bowel sounds are normal.      Tenderness: There is no abdominal " tenderness.   Musculoskeletal:      Right lower leg: No edema.      Left lower leg: No edema.   Skin:     General: Skin is warm and dry.   Neurological:      General: No focal deficit present.   Psychiatric:         Mood and Affect: Mood normal.         Assessment/Plan   Diagnoses and all orders for this visit:    1. Hypertension, benign (Primary)  Comments:  stop Diltiazem and Metoprolol and add Norvasc and Coreg. use Clonidine prn SBP>165. continue home BP checks and diary  Orders:  -     ECG 12 Lead  -     amLODIPine (NORVASC) 5 MG tablet; Take 1 tablet by mouth Daily.  Dispense: 90 tablet; Refill: 3  -     carvedilol (COREG) 12.5 MG tablet; Take 1 tablet by mouth 2 (Two) Times a Day.  Dispense: 60 tablet; Refill: 11    2. Paroxysmal atrial fibrillation (HCC)  Comments:  maintaining SR    3. Coronary artery disease involving native coronary artery of native heart without angina pectoris  Comments:  no angina  Orders:  -     atorvastatin (LIPITOR) 40 MG tablet; Take 1 tablet by mouth Daily.  Dispense: 90 tablet; Refill: 3        Mdm=mod - worsening bp with med changes scripted         Return in about 4 weeks (around 4/29/2022) for Next scheduled follow up.  Orders Placed This Encounter   Procedures   • ECG 12 Lead     Order Specific Question:   Reason for Exam:     Answer:   [email or web address]     Order Specific Question:   Release to patient     Answer:   Immediate

## 2022-04-08 ENCOUNTER — OFFICE VISIT (OUTPATIENT)
Dept: INTERNAL MEDICINE | Facility: CLINIC | Age: 87
End: 2022-04-08

## 2022-04-08 VITALS
DIASTOLIC BLOOD PRESSURE: 75 MMHG | TEMPERATURE: 97.1 F | OXYGEN SATURATION: 97 % | HEART RATE: 62 BPM | WEIGHT: 128.2 LBS | SYSTOLIC BLOOD PRESSURE: 165 MMHG | BODY MASS INDEX: 25.17 KG/M2 | HEIGHT: 60 IN

## 2022-04-08 DIAGNOSIS — R00.2 PALPITATIONS: ICD-10-CM

## 2022-04-08 DIAGNOSIS — I10 HYPERTENSION, BENIGN: Primary | ICD-10-CM

## 2022-04-08 DIAGNOSIS — I25.10 CORONARY ARTERY DISEASE INVOLVING NATIVE CORONARY ARTERY OF NATIVE HEART WITHOUT ANGINA PECTORIS: ICD-10-CM

## 2022-04-08 PROCEDURE — 99214 OFFICE O/P EST MOD 30 MIN: CPT | Performed by: NURSE PRACTITIONER

## 2022-04-08 NOTE — PROGRESS NOTES
Subjective   Alka Nicolas is a 86 y.o. female.   Chief Complaint   Patient presents with   • Hypertension       History of Present Illness     The following portions of the patient's history were reviewed and updated as appropriate: allergies, current medications, past family history, past medical history, past social history, past surgical history and problem list.    Review of Systems    Objective   Past Medical History:   Diagnosis Date   • Abdominal aortic aneurysm (HCC)    • Angina pectoris (HCC)    • Atherosclerosis of native coronary artery of native heart without angina pectoris    • CAD (coronary artery disease)    • Esophageal reflux    • GERD (gastroesophageal reflux disease)    • History of PTCA    • Hyperlipidemia    • Hypertension    • Hypertension, essential, benign    • Tobacco use disorder       Past Surgical History:   Procedure Laterality Date   • APPENDECTOMY     • CARDIAC CATHETERIZATION     • CARDIAC CATHETERIZATION Left 10/21/2020    Procedure: Cardiac Catheterization/Vascular Study;  Surgeon: Marcos Ratliff MD;  Location: [facility] CATH INVASIVE LOC[facility];  Service: Cardiology;  Laterality: Left;   • CHOLECYSTECTOMY     • HYSTERECTOMY      total        Current Outpatient Medications:   •  albuterol sulfate  (90 Base) MCG/ACT inhaler, Inhale 2 puffs 4 (Four) Times a Day., Disp: 6.7 g, Rfl: 5  •  amLODIPine (NORVASC) 5 MG tablet, Take 1 tablet by mouth Daily., Disp: 90 tablet, Rfl: 3  •  amoxicillin (AMOXIL) 250 MG capsule, Take 1 capsule by mouth Daily., Disp: 90 capsule, Rfl: 2  •  apixaban (ELIQUIS) 2.5 MG tablet tablet, Take 1 tablet by mouth Every 12 (Twelve) Hours. Indications: Atrial Fibrillation, Disp: 60 tablet, Rfl: 0  •  atorvastatin (LIPITOR) 40 MG tablet, Take 1 tablet by mouth Daily., Disp: 90 tablet, Rfl: 3  •  carvedilol (COREG) 12.5 MG tablet, Take 1 tablet by mouth 2 (Two) Times a Day., Disp: 60 tablet, Rfl: 11  •  Cholecalciferol (Vitamin D3) 1.25 MG  (62978 UT) tablet, Take  by mouth., Disp: , Rfl:   •  clobetasol (TEMOVATE) 0.05 % cream, Apply  topically to the appropriate area as directed 2 (Two) Times a Day., Disp: , Rfl:   •  fluticasone-salmeterol (Advair Diskus) 250-50 MCG/DOSE DISKUS, Inhale 1 puff 2 (Two) Times a Day., Disp: 1 each, Rfl: 5  •  levothyroxine (SYNTHROID, LEVOTHROID) 100 MCG tablet, Take 1 tablet by mouth Daily., Disp: 90 tablet, Rfl: 0  •  Multiple Vitamins-Minerals (PRESERVISION AREDS 2+MULTI VIT PO), Take 1 capsule by mouth Daily., Disp: , Rfl:   •  potassium chloride 10 MEQ CR tablet, Take 10 mEq by mouth Daily., Disp: , Rfl:   •  sodium bicarbonate 325 MG tablet, Take 1 tablet by mouth 2 (Two) Times a Day., Disp: 20 tablet, Rfl: 0  •  betamethasone dipropionate (DIPROLENE) 0.05 % ointment, Apply 1 application topically to the appropriate area as directed Daily As Needed., Disp: , Rfl:   •  cloNIDine (CATAPRES) 0.1 MG tablet, Take 0.1 mg by mouth Daily As Needed. For blood pressure > 180/90, Disp: , Rfl:   •  Hydrocortisone, Perianal, (ANUSOL-HC) 2.5 % rectal cream, Insert  into the rectum 2 (Two) Times a Day., Disp: , Rfl:     Current Facility-Administered Medications:   •  cyanocobalamin injection 1,000 mcg, 1,000 mcg, Intramuscular, Q28 Days, Alexis Rubio MD, 1,000 mcg at 12/16/21 1600      Vitals:    04/08/22 0838   BP: 165/75   Pulse: 62   Temp: 97.1 °F (36.2 °C)   SpO2: 97%         04/08/22  0838   Weight: 58.2 kg (128 lb 3.2 oz)       Body mass index is 25.04 kg/m².    Physical Exam          Assessment/Plan   There are no diagnoses linked to this encounter.

## 2022-04-08 NOTE — PROGRESS NOTES
Subjective   Alka Nicolas is a 86 y.o. female.   Chief Complaint   Patient presents with   • Hypertension       Alka presents today for 2 week follow up regarding hypertension.  At the previous appointment she was complaining of elevated Blood pressure, with heart rates in the 40s.  She has since followed up with cardiology on 4/1/22 and medication was adjusted.  She states she has started seeing improvements over the past 2 days.  BPs are now staying more in the 140-150s/70s.  There are higher BPs in the morning as high as 180-190 systolic for which she takes clonidine prn.  This does result In a lower reading.  Heart rate is now staying in the 50s.  She denies chest pain or shortness of breath.  She does have occasional palpitations that she states is not new and cardiology is aware.    Recent labs showed elevated TSH.  Will recheck in about 6 weeks.   Occult blood tests were negative for blood and CBC was stable.    The following portions of the patient's history were reviewed and updated as appropriate: allergies, current medications, past family history, past medical history, past social history, past surgical history and problem list.    Review of Systems   Constitutional: Negative for activity change, appetite change, fatigue, fever, unexpected weight gain and unexpected weight loss.   HENT: Negative for swollen glands, trouble swallowing and voice change.    Eyes: Negative for blurred vision and visual disturbance.   Respiratory: Negative for cough and shortness of breath.    Cardiovascular: Negative for chest pain, palpitations and leg swelling.   Gastrointestinal: Negative for abdominal pain, constipation, diarrhea, nausea, vomiting and indigestion.   Endocrine: Negative for cold intolerance, heat intolerance, polydipsia and polyphagia.   Genitourinary: Negative for dysuria and frequency.   Musculoskeletal: Negative for arthralgias, back pain, joint swelling and neck pain.   Skin: Negative for  color change, rash and skin lesions.   Neurological: Negative for dizziness, weakness, headache, memory problem and confusion.   Hematological: Does not bruise/bleed easily.   Psychiatric/Behavioral: Negative for agitation, hallucinations and suicidal ideas. The patient is not nervous/anxious.        Objective   Past Medical History:   Diagnosis Date   • Abdominal aortic aneurysm (HCC)    • Angina pectoris (HCC)    • Atherosclerosis of native coronary artery of native heart without angina pectoris    • CAD (coronary artery disease)    • Esophageal reflux    • GERD (gastroesophageal reflux disease)    • History of PTCA    • Hyperlipidemia    • Hypertension    • Hypertension, essential, benign    • Tobacco use disorder       Past Surgical History:   Procedure Laterality Date   • APPENDECTOMY     • CARDIAC CATHETERIZATION     • CARDIAC CATHETERIZATION Left 10/21/2020    Procedure: Cardiac Catheterization/Vascular Study;  Surgeon: Marcos Ratliff MD;  Location: [facility] CATH INVASIVE LOCATION;  Service: Cardiology;  Laterality: Left;   • CHOLECYSTECTOMY     • HYSTERECTOMY      total        Current Outpatient Medications:   •  albuterol sulfate  (90 Base) MCG/ACT inhaler, Inhale 2 puffs 4 (Four) Times a Day., Disp: 6.7 g, Rfl: 5  •  amLODIPine (NORVASC) 5 MG tablet, Take 1 tablet by mouth Daily., Disp: 90 tablet, Rfl: 3  •  amoxicillin (AMOXIL) 250 MG capsule, Take 1 capsule by mouth Daily., Disp: 90 capsule, Rfl: 2  •  apixaban (ELIQUIS) 2.5 MG tablet tablet, Take 1 tablet by mouth Every 12 (Twelve) Hours. Indications: Atrial Fibrillation, Disp: 60 tablet, Rfl: 0  •  atorvastatin (LIPITOR) 40 MG tablet, Take 1 tablet by mouth Daily., Disp: 90 tablet, Rfl: 3  •  carvedilol (COREG) 12.5 MG tablet, Take 1 tablet by mouth 2 (Two) Times a Day., Disp: 60 tablet, Rfl: 11  •  Cholecalciferol (Vitamin D3) 1.25 MG (81914 UT) tablet, Take  by mouth., Disp: , Rfl:   •  clobetasol (TEMOVATE) 0.05 % cream, Apply   topically to the appropriate area as directed 2 (Two) Times a Day., Disp: , Rfl:   •  fluticasone-salmeterol (Advair Diskus) 250-50 MCG/DOSE DISKUS, Inhale 1 puff 2 (Two) Times a Day., Disp: 1 each, Rfl: 5  •  levothyroxine (SYNTHROID, LEVOTHROID) 100 MCG tablet, Take 1 tablet by mouth Daily., Disp: 90 tablet, Rfl: 0  •  Multiple Vitamins-Minerals (PRESERVISION AREDS 2+MULTI VIT PO), Take 1 capsule by mouth Daily., Disp: , Rfl:   •  potassium chloride 10 MEQ CR tablet, Take 10 mEq by mouth Daily., Disp: , Rfl:   •  sodium bicarbonate 325 MG tablet, Take 1 tablet by mouth 2 (Two) Times a Day., Disp: 20 tablet, Rfl: 0  •  betamethasone dipropionate (DIPROLENE) 0.05 % ointment, Apply 1 application topically to the appropriate area as directed Daily As Needed., Disp: , Rfl:   •  cloNIDine (CATAPRES) 0.1 MG tablet, Take 0.1 mg by mouth Daily As Needed. For blood pressure > 180/90, Disp: , Rfl:   •  Hydrocortisone, Perianal, (ANUSOL-HC) 2.5 % rectal cream, Insert  into the rectum 2 (Two) Times a Day., Disp: , Rfl:     Current Facility-Administered Medications:   •  cyanocobalamin injection 1,000 mcg, 1,000 mcg, Intramuscular, Q28 Days, Alexis Rubio MD, 1,000 mcg at 12/16/21 1600      Vitals:    04/08/22 0838   BP: 165/75   Pulse: 62   Temp: 97.1 °F (36.2 °C)   SpO2: 97%         04/08/22  0838   Weight: 58.2 kg (128 lb 3.2 oz)       Body mass index is 25.04 kg/m².    Physical Exam  Constitutional:       General: She is not in acute distress.     Appearance: She is well-developed.   HENT:      Head: Normocephalic.      Right Ear: Tympanic membrane and external ear normal.      Left Ear: Tympanic membrane and external ear normal.      Mouth/Throat:      Mouth: Mucous membranes are moist. No oral lesions.      Pharynx: Oropharynx is clear.   Eyes:      Conjunctiva/sclera: Conjunctivae normal.   Cardiovascular:      Rate and Rhythm: Normal rate and regular rhythm.      Pulses: Normal pulses.      Heart sounds: Normal  heart sounds.   Pulmonary:      Effort: Pulmonary effort is normal.      Breath sounds: Normal breath sounds.   Skin:     General: Skin is warm and dry.   Neurological:      Mental Status: She is alert and oriented to person, place, and time.      Gait: Gait normal.   Psychiatric:         Mood and Affect: Mood normal.         Speech: Speech normal.         Behavior: Behavior normal.         Thought Content: Thought content normal.               Assessment/Plan   Diagnoses and all orders for this visit:    1. Hypertension, benign (Primary)    2. Palpitations    3. Coronary artery disease involving native coronary artery of native heart without angina pectoris      Discussed appropriate use of Clonidine.  Can use up to twice daily if needed for systolic BP > 165.  Advised to continue monitoring at this time and keep follow ups with cardiology. BP is somewhat elevated in the office today, but overall readings are starting to improve.  Will avoid additional adjustments at this time to avoid further bradycardia, increasing her risk for falls.  She is on chronic anticoagulation.    She is asking for B12 injection today.  Offered to give injection but would want to check level as it has been awhile since this was checked.  She opted to just wait and will get labs and injections at next follow up.  She is due for full labs in June.

## 2022-05-04 ENCOUNTER — OFFICE VISIT (OUTPATIENT)
Dept: CARDIOLOGY | Facility: CLINIC | Age: 87
End: 2022-05-04

## 2022-05-04 VITALS
SYSTOLIC BLOOD PRESSURE: 123 MMHG | HEART RATE: 64 BPM | OXYGEN SATURATION: 98 % | WEIGHT: 129 LBS | BODY MASS INDEX: 25.32 KG/M2 | DIASTOLIC BLOOD PRESSURE: 60 MMHG | HEIGHT: 60 IN

## 2022-05-04 DIAGNOSIS — E78.2 MIXED HYPERLIPIDEMIA: ICD-10-CM

## 2022-05-04 DIAGNOSIS — I10 HYPERTENSION, BENIGN: ICD-10-CM

## 2022-05-04 DIAGNOSIS — I25.10 CORONARY ARTERY DISEASE INVOLVING NATIVE CORONARY ARTERY OF NATIVE HEART WITHOUT ANGINA PECTORIS: Primary | ICD-10-CM

## 2022-05-04 PROCEDURE — 99212 OFFICE O/P EST SF 10 MIN: CPT | Performed by: INTERNAL MEDICINE

## 2022-05-04 NOTE — PROGRESS NOTES
"Subjective    Alka Salima Nicolas is a 86 y.o. female. Fu HTN    History of Present Illness     HTN:  Since changing her BP meds her home readings are now \"130's/60's and I feel great\". Has had return of energy and can do her flower gardening ok now.    IHD:  Good stamina and no cp or unusual soa.    The following portions of the patient's history were reviewed and updated as appropriate: allergies, current medications, past family history, past medical history, past social history, past surgical history and problem list.    Patient Active Problem List   Diagnosis   • CAD (coronary artery disease)   • Hypertension, benign   • Hyperlipidemia   • Palpitations   • Hypothyroidism (acquired)   • Psoriasis   • Vitamin B12 deficiency   • Grade 1 out of 6 intensity murmur   • Unstable angina (HCC)   • COPD, mild (HCC)   • Osteoarthritis   • Pernicious anemia   • Acute kidney injury (HCC)   • Hypokalemia   • Metabolic acidosis   • Acute cystitis   • Pneumonitis   • Chronic kidney disease, stage IV (severe) (HCC)   • Elevated troponin   • Failure to thrive in adult   • Moderate malnutrition (CMS/HCC)   • Paroxysmal atrial fibrillation (HCC)       No Known Allergies    Family History   Problem Relation Age of Onset   • Diabetes Mother    • Heart disease Father    • Hyperlipidemia Father    • Hypertension Father    • Stroke Father    • Breast cancer Neg Hx        Social History     Socioeconomic History   • Marital status:    Tobacco Use   • Smoking status: Former Smoker     Packs/day: 0.50     Types: Cigarettes     Quit date: 2020     Years since quittin.0   • Smokeless tobacco: Never Used   Substance and Sexual Activity   • Alcohol use: No   • Drug use: No   • Sexual activity: Not Currently     Partners: Male         Current Outpatient Medications:   •  albuterol sulfate  (90 Base) MCG/ACT inhaler, Inhale 2 puffs 4 (Four) Times a Day., Disp: 6.7 g, Rfl: 5  •  amLODIPine (NORVASC) 5 MG tablet, Take 1 " tablet by mouth Daily., Disp: 90 tablet, Rfl: 3  •  amoxicillin (AMOXIL) 250 MG capsule, Take 1 capsule by mouth Daily., Disp: 90 capsule, Rfl: 2  •  apixaban (ELIQUIS) 2.5 MG tablet tablet, Take 1 tablet by mouth Every 12 (Twelve) Hours. Indications: Atrial Fibrillation, Disp: 60 tablet, Rfl: 0  •  atorvastatin (LIPITOR) 40 MG tablet, Take 1 tablet by mouth Daily., Disp: 90 tablet, Rfl: 3  •  betamethasone dipropionate (DIPROLENE) 0.05 % ointment, Apply 1 application topically to the appropriate area as directed Daily As Needed., Disp: , Rfl:   •  carvedilol (COREG) 12.5 MG tablet, Take 1 tablet by mouth 2 (Two) Times a Day., Disp: 60 tablet, Rfl: 11  •  Cholecalciferol (Vitamin D3) 1.25 MG (08832 UT) tablet, Take  by mouth., Disp: , Rfl:   •  clobetasol (TEMOVATE) 0.05 % cream, Apply  topically to the appropriate area as directed 2 (Two) Times a Day., Disp: , Rfl:   •  cloNIDine (CATAPRES) 0.1 MG tablet, Take 0.1 mg by mouth Daily As Needed. For blood pressure > 180/90, Disp: , Rfl:   •  fluticasone-salmeterol (Advair Diskus) 250-50 MCG/DOSE DISKUS, Inhale 1 puff 2 (Two) Times a Day., Disp: 1 each, Rfl: 5  •  Hydrocortisone, Perianal, (ANUSOL-HC) 2.5 % rectal cream, Insert  into the rectum 2 (Two) Times a Day., Disp: , Rfl:   •  levothyroxine (SYNTHROID, LEVOTHROID) 100 MCG tablet, Take 1 tablet by mouth Daily., Disp: 90 tablet, Rfl: 0  •  Multiple Vitamins-Minerals (PRESERVISION AREDS 2+MULTI VIT PO), Take 1 capsule by mouth Daily., Disp: , Rfl:   •  potassium chloride 10 MEQ CR tablet, Take 10 mEq by mouth Daily., Disp: , Rfl:   •  sodium bicarbonate 325 MG tablet, Take 1 tablet by mouth 2 (Two) Times a Day., Disp: 20 tablet, Rfl: 0    Current Facility-Administered Medications:   •  cyanocobalamin injection 1,000 mcg, 1,000 mcg, Intramuscular, Q28 Days, Alexis Rubio MD, 1,000 mcg at 12/16/21 1600    Past Surgical History:   Procedure Laterality Date   • APPENDECTOMY     • CARDIAC CATHETERIZATION     •  "CARDIAC CATHETERIZATION Left 10/21/2020    Procedure: Cardiac Catheterization/Vascular Study;  Surgeon: Marcos Ratliff MD;  Location:  [facility] CATH INVASIVE LOCATION;  Service: Cardiology;  Laterality: Left;   • CHOLECYSTECTOMY     • HYSTERECTOMY      total       Review of Systems   Constitutional: Negative for activity change and fatigue.   Respiratory: Negative for shortness of breath.    Cardiovascular: Negative for chest pain and palpitations.       /60   Pulse 64   Ht 152.4 cm (60\")   Wt 58.5 kg (129 lb)   SpO2 98%   BMI 25.19 kg/m²   Procedures    Objective   Physical Exam  Constitutional:       Appearance: Normal appearance.   Cardiovascular:      Rate and Rhythm: Normal rate and regular rhythm.      Pulses: Normal pulses.      Heart sounds: Normal heart sounds. No murmur heard.    No gallop.   Pulmonary:      Effort: Pulmonary effort is normal.      Breath sounds: Normal breath sounds.   Abdominal:      General: Bowel sounds are normal.   Musculoskeletal:      Right lower leg: No edema.      Left lower leg: No edema.   Skin:     General: Skin is warm.   Neurological:      General: No focal deficit present.      Mental Status: She is oriented to person, place, and time.   Psychiatric:         Mood and Affect: Mood normal.         Behavior: Behavior normal.         Assessment/Plan   Diagnoses and all orders for this visit:    1. Coronary artery disease involving native coronary artery of native heart without angina pectoris (Primary)  Comments:  no angina    2. Hypertension, benign  Comments:  now controlled    3. Mixed hyperlipidemia  Comments:  on potent statin                 Return in about 6 months (around 11/4/2022) for Next scheduled follow up.  No orders of the defined types were placed in this encounter.    "

## 2022-07-15 ENCOUNTER — OFFICE VISIT (OUTPATIENT)
Dept: INTERNAL MEDICINE | Facility: CLINIC | Age: 87
End: 2022-07-15

## 2022-07-15 VITALS
BODY MASS INDEX: 26.31 KG/M2 | WEIGHT: 134 LBS | HEART RATE: 61 BPM | SYSTOLIC BLOOD PRESSURE: 130 MMHG | HEIGHT: 60 IN | TEMPERATURE: 97.5 F | OXYGEN SATURATION: 99 % | DIASTOLIC BLOOD PRESSURE: 70 MMHG

## 2022-07-15 DIAGNOSIS — E03.9 ACQUIRED HYPOTHYROIDISM: ICD-10-CM

## 2022-07-15 DIAGNOSIS — Z00.00 ENCOUNTER FOR SUBSEQUENT ANNUAL WELLNESS VISIT (AWV) IN MEDICARE PATIENT: Primary | ICD-10-CM

## 2022-07-15 DIAGNOSIS — N18.4 CHRONIC KIDNEY DISEASE, STAGE IV (SEVERE): ICD-10-CM

## 2022-07-15 DIAGNOSIS — E78.2 MIXED HYPERLIPIDEMIA: ICD-10-CM

## 2022-07-15 DIAGNOSIS — I10 HYPERTENSION, BENIGN: ICD-10-CM

## 2022-07-15 DIAGNOSIS — D51.0 PERNICIOUS ANEMIA: ICD-10-CM

## 2022-07-15 PROCEDURE — G0439 PPPS, SUBSEQ VISIT: HCPCS | Performed by: FAMILY MEDICINE

## 2022-07-15 NOTE — PROGRESS NOTES
The ABCs of the Annual Wellness Visit  Subsequent Medicare Wellness Visit    Chief Complaint   Patient presents with   • Medicare Wellness-subsequent      Subjective    History of Present Illness:  Alka Nicolas is a 87 y.o. female who presents for a Subsequent Medicare Wellness Visit.  Renal failure  The patient states she was in the hospital last year for renal failure. She reports she does not have a living will and does not want information on one.    Hypertension  The patient states she checks her blood pressure at home. Her blood pressure today is 130/70 mmHg. She states it stays around this area at home.    Hyperlipidemia  The patient states she does not watch her diet. She denies any unusual fatigue. She reports she does not have the energy she used to have; however, nothing that she considers unusual.    COPD  The patient states she smoked for 60 years and was on inhalers. She notes she is doing well with it right now. She admits she does not follow with a pulmonologist.    Health maintenance  The patient states her bowels move okay. She denies any difficulty urinating. She adds her appetite is very good. She notes she has swelling as the day goes on. She states when she goes to bed at night and puts her legs up, it does go away in the morning.      The following portions of the patient's history were reviewed and   updated as appropriate:   .    Compared to one year ago, the patient feels her physical   health is better.    Compared to one year ago, the patient feels her mental   health is the same.    Recent Hospitalizations:  This patient has had a [facility] admission record on file within the last 365 days.    Current Medical Providers:  Patient Care Team:  Beverley Mccain DO as PCP - General (Family Medicine)    Outpatient Medications Prior to Visit   Medication Sig Dispense Refill   • albuterol sulfate  (90 Base) MCG/ACT inhaler Inhale 2 puffs 4 (Four) Times a Day. 6.7 g 5    • amLODIPine (NORVASC) 5 MG tablet Take 1 tablet by mouth Daily. 90 tablet 3   • apixaban (ELIQUIS) 2.5 MG tablet tablet Take 1 tablet by mouth Every 12 (Twelve) Hours. Indications: Atrial Fibrillation 60 tablet 0   • atorvastatin (LIPITOR) 40 MG tablet Take 1 tablet by mouth Daily. 90 tablet 3   • betamethasone dipropionate (DIPROLENE) 0.05 % ointment Apply 1 application topically to the appropriate area as directed Daily As Needed.     • carvedilol (COREG) 12.5 MG tablet Take 1 tablet by mouth 2 (Two) Times a Day. 60 tablet 11   • Cholecalciferol (Vitamin D3) 1.25 MG (85716 UT) tablet Take  by mouth.     • clobetasol (TEMOVATE) 0.05 % cream Apply  topically to the appropriate area as directed 2 (Two) Times a Day.     • cloNIDine (CATAPRES) 0.1 MG tablet Take 0.1 mg by mouth Daily As Needed. For blood pressure > 180/90     • fluticasone-salmeterol (Advair Diskus) 250-50 MCG/DOSE DISKUS Inhale 1 puff 2 (Two) Times a Day. 1 each 5   • Hydrocortisone, Perianal, (ANUSOL-HC) 2.5 % rectal cream Insert  into the rectum 2 (Two) Times a Day.     • levothyroxine (SYNTHROID, LEVOTHROID) 100 MCG tablet Take 1 tablet by mouth Daily. 90 tablet 0   • Multiple Vitamins-Minerals (PRESERVISION AREDS 2+MULTI VIT PO) Take 1 capsule by mouth Daily.     • potassium chloride 10 MEQ CR tablet Take 10 mEq by mouth Daily.     • sodium bicarbonate 325 MG tablet Take 1 tablet by mouth 2 (Two) Times a Day. 20 tablet 0   • amoxicillin (AMOXIL) 250 MG capsule Take 1 capsule by mouth Daily. 90 capsule 2     Facility-Administered Medications Prior to Visit   Medication Dose Route Frequency Provider Last Rate Last Admin   • cyanocobalamin injection 1,000 mcg  1,000 mcg Intramuscular Q28 Days Alexis Rubio MD   1,000 mcg at 12/16/21 1600       No opioid medication identified on active medication list. I have reviewed chart for other potential  high risk medication/s and harmful drug interactions in the elderly.          Aspirin is not on  "active medication list.  Aspirin use is not indicated based on review of current medical condition/s. Risk of harm outweighs potential benefits.  .    Patient Active Problem List   Diagnosis   • CAD (coronary artery disease)   • Hypertension, benign   • Hyperlipidemia   • Palpitations   • Hypothyroidism (acquired)   • Psoriasis   • Vitamin B12 deficiency   • Grade 1 out of 6 intensity murmur   • Unstable angina (HCC)   • COPD, mild (HCC)   • Osteoarthritis   • Pernicious anemia   • Acute kidney injury (HCC)   • Hypokalemia   • Metabolic acidosis   • Acute cystitis   • Pneumonitis   • Chronic kidney disease, stage IV (severe) (HCC)   • Elevated troponin   • Failure to thrive in adult   • Moderate malnutrition (CMS/HCC)   • Paroxysmal atrial fibrillation (HCC)     Advance Care Planning  Advance Directive is not on file.  ACP discussion was declined by the patient. Patient does not have an advance directive, declines further assistance.    Review of Systems   Cardiovascular: Positive for leg swelling.        Objective    Vitals:    07/15/22 0853   BP: 130/70   BP Location: Left arm   Patient Position: Sitting   Cuff Size: Adult   Pulse: 61   Temp: 97.5 °F (36.4 °C)   TempSrc: Temporal   SpO2: 99%   Weight: 60.8 kg (134 lb)   Height: 152.4 cm (60\")     Estimated body mass index is 26.17 kg/m² as calculated from the following:    Height as of this encounter: 152.4 cm (60\").    Weight as of this encounter: 60.8 kg (134 lb).    BMI is >= 25 and <30. (Overweight) The following options were offered after discussion;: exercise counseling/recommendations and nutrition counseling/recommendations      Does the patient have evidence of cognitive impairment? No    Physical Exam  Vitals and nursing note reviewed.   Constitutional:       Appearance: Normal appearance. She is well-developed.   HENT:      Head: Normocephalic and atraumatic.      Right Ear: External ear normal.      Left Ear: External ear normal.      Nose: Nose " normal.      Mouth/Throat:      Mouth: Mucous membranes are moist.   Eyes:      Extraocular Movements: Extraocular movements intact.      Conjunctiva/sclera: Conjunctivae normal.      Pupils: Pupils are equal, round, and reactive to light.   Neck:      Thyroid: No thyromegaly.      Vascular: No JVD.      Trachea: No tracheal deviation.   Cardiovascular:      Rate and Rhythm: Normal rate and regular rhythm.      Pulses: Normal pulses.      Heart sounds: Normal heart sounds. No murmur heard.    No friction rub. No gallop.   Pulmonary:      Effort: Pulmonary effort is normal.      Breath sounds: Normal breath sounds.   Abdominal:      General: Bowel sounds are normal. There is no distension.      Palpations: Abdomen is soft.      Tenderness: There is no abdominal tenderness.   Musculoskeletal:         General: Normal range of motion.      Cervical back: Normal range of motion and neck supple.   Lymphadenopathy:      Cervical: No cervical adenopathy.   Skin:     General: Skin is warm and dry.      Capillary Refill: Capillary refill takes less than 2 seconds.   Neurological:      Mental Status: She is alert and oriented to person, place, and time.      Cranial Nerves: No cranial nerve deficit.      Coordination: Coordination normal.   Psychiatric:         Mood and Affect: Mood normal.         Behavior: Behavior normal.                 HEALTH RISK ASSESSMENT    Smoking Status:  Social History     Tobacco Use   Smoking Status Former Smoker   • Packs/day: 0.50   • Types: Cigarettes   • Quit date: 2020   • Years since quittin.2   Smokeless Tobacco Never Used     Alcohol Consumption:  Social History     Substance and Sexual Activity   Alcohol Use No     Fall Risk Screen:    STEADI Fall Risk Assessment was completed, and patient is at LOW risk for falls.Assessment completed on:7/15/2022    Depression Screening:  PHQ-2/PHQ-9 Depression Screening 7/15/2022   Retired PHQ-9 Total Score -   Retired Total Score -   Irma  Interest or Pleasure in Doing Things 0-->not at all   Feeling Down, Depressed or Hopeless 0-->not at all   PHQ-9: Brief Depression Severity Measure Score 0       Health Habits and Functional and Cognitive Screening:  Functional & Cognitive Status 7/15/2022   Do you have difficulty preparing food and eating? No   Do you have difficulty bathing yourself, getting dressed or grooming yourself? No   Do you have difficulty using the toilet? No   Do you have difficulty moving around from place to place? No   Do you have trouble with steps or getting out of a bed or a chair? No   Current Diet Unhealthy Diet   Dental Exam -   Eye Exam -   Exercise (times per week) 7 times per week   Current Exercises Include Walking   Current Exercise Activities Include -   Do you need help using the phone?  No   Are you deaf or do you have serious difficulty hearing?  No   Do you need help with transportation? No   Do you need help shopping? No   Do you need help preparing meals?  No   Do you need help with housework?  No   Do you need help with laundry? No   Do you need help taking your medications? No   Do you need help managing money? No   Do you ever drive or ride in a car without wearing a seat belt? No   Have you felt unusual stress, anger or loneliness in the last month? No   Who do you live with? Spouse   If you need help, do you have trouble finding someone available to you? No   Have you been bothered in the last four weeks by sexual problems? No   Do you have difficulty concentrating, remembering or making decisions? No       Age-appropriate Screening Schedule:  Refer to the list below for future screening recommendations based on patient's age, sex and/or medical conditions. Orders for these recommended tests are listed in the plan section. The patient has been provided with a written plan.    Health Maintenance   Topic Date Due   • LIPID PANEL  06/16/2022   • TDAP/TD VACCINES (1 - Tdap) 07/15/2022 (Originally 5/9/1954)   •  ZOSTER VACCINE (1 of 2) 07/15/2023 (Originally 5/9/1985)   • DXA SCAN  06/16/2025 (Originally 9/4/2020)   • INFLUENZA VACCINE  10/01/2022              Assessment & Plan   CMS Preventative Services Quick Reference  Risk Factors Identified During Encounter  Obesity/Overweight   The above risks/problems have been discussed with the patient.  Follow up actions/plans if indicated are seen below in the Assessment/Plan Section.  Pertinent information has been shared with the patient in the After Visit Summary.    Diagnoses and all orders for this visit:    1. Encounter for subsequent annual wellness visit (AWV) in Medicare patient (Primary)    2. Hypertension, benign  -     Comprehensive Metabolic Panel  -     Urinalysis With Microscopic - Urine, Clean Catch  -     Uric Acid  Monitor blood pressure routinely.  Goal is less than 130/80.  Continue current medications.  3. Mixed hyperlipidemia  -     TSH  -     Lipid Panel  Low-cholesterol diet.  Continue statin  4. Pernicious anemia  -     CBC & Differential  Monitor vitamin B level and CBC  5. Acquired hypothyroidism  -     T4  -     T3, free    6. Chronic kidney disease, stage IV (severe) (HCC)  Monitor CMP routinely    he patient's medications were reviewed with her. She is going to stay on the same doses as she currently is taking. We have encouraged her to stay active, walking daily, portion control for her diet We have discussed the flu vaccine. This is going to be available in the 09/2022 or 10/2022 timeframe and we have encouraged her to get it and we will follow her back in 6 months unless she is having a problem. If she is having a problem, she is going to call.    Follow Up:   Return in about 6 months (around 1/15/2023).     An After Visit Summary and PPPS were made available to the patient.                   Transcribed from ambient dictation for Beverley Mccain DO by JAN JIMÉNEZ.  07/15/22   10:03 CDT    Patient verbalized consent to the visit  recording.

## 2022-07-16 LAB
ALBUMIN SERPL-MCNC: 4.2 G/DL (ref 3.5–5.2)
ALBUMIN/GLOB SERPL: 1.8 G/DL
ALP SERPL-CCNC: 155 U/L (ref 39–117)
ALT SERPL-CCNC: 11 U/L (ref 1–33)
APPEARANCE UR: CLEAR
AST SERPL-CCNC: 13 U/L (ref 1–32)
BACTERIA #/AREA URNS HPF: ABNORMAL /HPF
BASOPHILS # BLD AUTO: 0.03 10*3/MM3 (ref 0–0.2)
BASOPHILS NFR BLD AUTO: 0.4 % (ref 0–1.5)
BILIRUB SERPL-MCNC: 0.6 MG/DL (ref 0–1.2)
BILIRUB UR QL STRIP: NEGATIVE
BUN SERPL-MCNC: 34 MG/DL (ref 8–23)
BUN/CREAT SERPL: 27.2 (ref 7–25)
CALCIUM SERPL-MCNC: 9.2 MG/DL (ref 8.6–10.5)
CASTS URNS MICRO: ABNORMAL
CHLORIDE SERPL-SCNC: 105 MMOL/L (ref 98–107)
CHOLEST SERPL-MCNC: 120 MG/DL (ref 0–200)
CO2 SERPL-SCNC: 22.6 MMOL/L (ref 22–29)
COLOR UR: YELLOW
CREAT SERPL-MCNC: 1.25 MG/DL (ref 0.57–1)
EGFRCR SERPLBLD CKD-EPI 2021: 41.8 ML/MIN/1.73
EOSINOPHIL # BLD AUTO: 0.38 10*3/MM3 (ref 0–0.4)
EOSINOPHIL NFR BLD AUTO: 5.1 % (ref 0.3–6.2)
EPI CELLS #/AREA URNS HPF: ABNORMAL /HPF
ERYTHROCYTE [DISTWIDTH] IN BLOOD BY AUTOMATED COUNT: 12.9 % (ref 12.3–15.4)
GLOBULIN SER CALC-MCNC: 2.4 GM/DL
GLUCOSE SERPL-MCNC: 92 MG/DL (ref 65–99)
GLUCOSE UR QL STRIP: NEGATIVE
HCT VFR BLD AUTO: 36.9 % (ref 34–46.6)
HDLC SERPL-MCNC: 54 MG/DL (ref 40–60)
HGB BLD-MCNC: 12 G/DL (ref 12–15.9)
HGB UR QL STRIP: NEGATIVE
IMM GRANULOCYTES # BLD AUTO: 0.03 10*3/MM3 (ref 0–0.05)
IMM GRANULOCYTES NFR BLD AUTO: 0.4 % (ref 0–0.5)
KETONES UR QL STRIP: NEGATIVE
LDLC SERPL CALC-MCNC: 49 MG/DL (ref 0–100)
LEUKOCYTE ESTERASE UR QL STRIP: ABNORMAL
LYMPHOCYTES # BLD AUTO: 1.56 10*3/MM3 (ref 0.7–3.1)
LYMPHOCYTES NFR BLD AUTO: 20.8 % (ref 19.6–45.3)
MCH RBC QN AUTO: 29.7 PG (ref 26.6–33)
MCHC RBC AUTO-ENTMCNC: 32.5 G/DL (ref 31.5–35.7)
MCV RBC AUTO: 91.3 FL (ref 79–97)
MONOCYTES # BLD AUTO: 0.56 10*3/MM3 (ref 0.1–0.9)
MONOCYTES NFR BLD AUTO: 7.5 % (ref 5–12)
NEUTROPHILS # BLD AUTO: 4.95 10*3/MM3 (ref 1.7–7)
NEUTROPHILS NFR BLD AUTO: 65.8 % (ref 42.7–76)
NITRITE UR QL STRIP: POSITIVE
NRBC BLD AUTO-RTO: 0 /100 WBC (ref 0–0.2)
PH UR STRIP: 6.5 [PH] (ref 5–8)
PLATELET # BLD AUTO: 170 10*3/MM3 (ref 140–450)
POTASSIUM SERPL-SCNC: 4.6 MMOL/L (ref 3.5–5.2)
PROT SERPL-MCNC: 6.6 G/DL (ref 6–8.5)
PROT UR QL STRIP: NEGATIVE
RBC # BLD AUTO: 4.04 10*6/MM3 (ref 3.77–5.28)
RBC #/AREA URNS HPF: ABNORMAL /HPF
SODIUM SERPL-SCNC: 140 MMOL/L (ref 136–145)
SP GR UR STRIP: 1.01 (ref 1–1.03)
T3FREE SERPL-MCNC: 2.3 PG/ML (ref 2–4.4)
T4 SERPL-MCNC: 11.1 UG/DL (ref 4.5–12)
TRIGL SERPL-MCNC: 89 MG/DL (ref 0–150)
TSH SERPL DL<=0.005 MIU/L-ACNC: 1.35 UIU/ML (ref 0.27–4.2)
URATE SERPL-MCNC: 5.8 MG/DL (ref 2.4–5.7)
UROBILINOGEN UR STRIP-MCNC: ABNORMAL MG/DL
VLDLC SERPL CALC-MCNC: 17 MG/DL (ref 5–40)
WBC # BLD AUTO: 7.51 10*3/MM3 (ref 3.4–10.8)
WBC #/AREA URNS HPF: ABNORMAL /HPF

## 2022-11-01 ENCOUNTER — FLU SHOT (OUTPATIENT)
Dept: INTERNAL MEDICINE | Facility: CLINIC | Age: 87
End: 2022-11-01

## 2022-11-01 DIAGNOSIS — Z23 NEED FOR VACCINATION: ICD-10-CM

## 2022-11-01 DIAGNOSIS — Z23 NEED FOR INFLUENZA VACCINATION: Primary | ICD-10-CM

## 2022-11-01 PROCEDURE — G0008 ADMIN INFLUENZA VIRUS VAC: HCPCS | Performed by: NURSE PRACTITIONER

## 2022-11-01 PROCEDURE — 90662 IIV NO PRSV INCREASED AG IM: CPT | Performed by: NURSE PRACTITIONER

## 2022-11-01 PROCEDURE — 0124A COVID-19 (PFIZER) BIVALENT BOOSTER 12+YRS: CPT | Performed by: NURSE PRACTITIONER

## 2022-11-01 PROCEDURE — 91312 COVID-19 (PFIZER) BIVALENT BOOSTER 12+YRS: CPT | Performed by: NURSE PRACTITIONER

## 2022-11-16 ENCOUNTER — OFFICE VISIT (OUTPATIENT)
Dept: CARDIOLOGY | Facility: CLINIC | Age: 87
End: 2022-11-16

## 2022-11-16 VITALS
OXYGEN SATURATION: 100 % | SYSTOLIC BLOOD PRESSURE: 139 MMHG | WEIGHT: 140 LBS | HEART RATE: 57 BPM | BODY MASS INDEX: 27.48 KG/M2 | DIASTOLIC BLOOD PRESSURE: 57 MMHG | HEIGHT: 60 IN

## 2022-11-16 DIAGNOSIS — I48.0 PAROXYSMAL ATRIAL FIBRILLATION: ICD-10-CM

## 2022-11-16 DIAGNOSIS — I10 HYPERTENSION, BENIGN: ICD-10-CM

## 2022-11-16 DIAGNOSIS — E78.2 MIXED HYPERLIPIDEMIA: ICD-10-CM

## 2022-11-16 DIAGNOSIS — I25.10 CORONARY ARTERY DISEASE INVOLVING NATIVE CORONARY ARTERY OF NATIVE HEART WITHOUT ANGINA PECTORIS: Primary | ICD-10-CM

## 2022-11-16 PROCEDURE — 99213 OFFICE O/P EST LOW 20 MIN: CPT | Performed by: INTERNAL MEDICINE

## 2022-11-16 PROCEDURE — 93000 ELECTROCARDIOGRAM COMPLETE: CPT | Performed by: INTERNAL MEDICINE

## 2022-11-16 RX ORDER — AMOXICILLIN 250 MG/1
250 CAPSULE ORAL DAILY
COMMUNITY

## 2022-11-16 NOTE — PROGRESS NOTES
"Subjective    Alka Nicolas is a 87 y.o. female. Fu of rhythm, ihd and risks    History of Present Illness     PAR AFIB:  Has had no palpitations since LOV. Is on a DOAC without any known bleeding problems. EKG today is sr and nsc.    INOCA:  She is very active with gardening and has good stamina and it has not declined in the past year. She has not had any anginal type cp in the past year.    HTN:  She is feeling good on her current meds and home readings are \"130's/50-60's\"    HLD:  LDL<50 per check . Is on a potent statin ok.        The following portions of the patient's history were reviewed and updated as appropriate: allergies, current medications, past family history, past medical history, past social history, past surgical history and problem list.    Patient Active Problem List   Diagnosis   • CAD (coronary artery disease)   • Hypertension, benign   • Hyperlipidemia   • Palpitations   • Hypothyroidism (acquired)   • Psoriasis   • Vitamin B12 deficiency   • Grade 1 out of 6 intensity murmur   • Unstable angina (HCC)   • COPD, mild (HCC)   • Osteoarthritis   • Pernicious anemia   • Acute kidney injury (HCC)   • Hypokalemia   • Metabolic acidosis   • Acute cystitis   • Pneumonitis   • Chronic kidney disease, stage IV (severe) (HCC)   • Elevated troponin   • Failure to thrive in adult   • Moderate malnutrition (CMS/HCC)   • Paroxysmal atrial fibrillation (HCC)       No Known Allergies    Family History   Problem Relation Age of Onset   • Diabetes Mother    • Heart disease Father    • Hyperlipidemia Father    • Hypertension Father    • Stroke Father    • Breast cancer Neg Hx        Social History     Socioeconomic History   • Marital status:    Tobacco Use   • Smoking status: Former     Packs/day: 0.50     Types: Cigarettes     Quit date: 2020     Years since quittin.6   • Smokeless tobacco: Never   Vaping Use   • Vaping Use: Never used   Substance and Sexual Activity   • Alcohol use: " No   • Drug use: No   • Sexual activity: Not Currently     Partners: Male         Current Outpatient Medications:   •  albuterol sulfate  (90 Base) MCG/ACT inhaler, Inhale 2 puffs 4 (Four) Times a Day., Disp: 6.7 g, Rfl: 5  •  amLODIPine (NORVASC) 5 MG tablet, Take 1 tablet by mouth Daily., Disp: 90 tablet, Rfl: 3  •  amoxicillin (AMOXIL) 250 MG capsule, Take 250 mg by mouth Daily., Disp: , Rfl:   •  apixaban (ELIQUIS) 2.5 MG tablet tablet, Take 1 tablet by mouth Every 12 (Twelve) Hours. Indications: Atrial Fibrillation, Disp: 60 tablet, Rfl: 0  •  atorvastatin (LIPITOR) 40 MG tablet, Take 1 tablet by mouth Daily., Disp: 90 tablet, Rfl: 3  •  betamethasone dipropionate (DIPROLENE) 0.05 % ointment, Apply 1 application topically to the appropriate area as directed Daily As Needed., Disp: , Rfl:   •  carvedilol (COREG) 12.5 MG tablet, Take 1 tablet by mouth 2 (Two) Times a Day., Disp: 60 tablet, Rfl: 11  •  clobetasol (TEMOVATE) 0.05 % cream, Apply  topically to the appropriate area as directed 2 (Two) Times a Day., Disp: , Rfl:   •  cloNIDine (CATAPRES) 0.1 MG tablet, Take 0.1 mg by mouth Daily As Needed. For blood pressure > 180/90, Disp: , Rfl:   •  fluticasone-salmeterol (Advair Diskus) 250-50 MCG/DOSE DISKUS, Inhale 1 puff 2 (Two) Times a Day., Disp: 1 each, Rfl: 5  •  Hydrocortisone, Perianal, (ANUSOL-HC) 2.5 % rectal cream, Insert  into the rectum As Needed., Disp: , Rfl:   •  levothyroxine (SYNTHROID, LEVOTHROID) 100 MCG tablet, Take 1 tablet by mouth Daily., Disp: 90 tablet, Rfl: 0  •  Multiple Vitamins-Minerals (PRESERVISION AREDS 2+MULTI VIT PO), Take 1 capsule by mouth Daily., Disp: , Rfl:   •  potassium chloride 10 MEQ CR tablet, Take 10 mEq by mouth Daily. Takes 2 tablets daily, Disp: , Rfl:   •  Cholecalciferol (Vitamin D3) 1.25 MG (07249 UT) tablet, Take  by mouth., Disp: , Rfl:     Current Facility-Administered Medications:   •  cyanocobalamin injection 1,000 mcg, 1,000 mcg, Intramuscular, Q28  "Ramiro Ibanez Ronald L, MD, 1,000 mcg at 12/16/21 1600    Past Surgical History:   Procedure Laterality Date   • APPENDECTOMY     • CARDIAC CATHETERIZATION     • CARDIAC CATHETERIZATION Left 10/21/2020    Procedure: Cardiac Catheterization/Vascular Study;  Surgeon: Marcos Ratliff MD;  Location:  [facility]ATH [facility]ASIVE [facility];  Service: Cardiology;  Laterality: Left;   • CHOLECYSTECTOMY     • HYSTERECTOMY      total       Review of Systems   Constitutional: Negative for activity change, appetite change and fatigue.   Respiratory: Negative for shortness of breath and wheezing.    Cardiovascular: Negative for chest pain, palpitations and leg swelling.   Gastrointestinal: Negative for abdominal pain and blood in stool.   Genitourinary: Negative for difficulty urinating and hematuria.       /57   Pulse 57   Ht 152.4 cm (60\")   Wt 63.5 kg (140 lb)   LMP  (LMP Unknown)   SpO2 100%   BMI 27.34 kg/m²   Procedures    Objective   Physical Exam  Constitutional:       Appearance: Normal appearance.   Cardiovascular:      Rate and Rhythm: Normal rate and regular rhythm.      Heart sounds: Murmur heard.    Systolic murmur is present with a grade of 1/6.    No friction rub. No gallop.      Comments: Sherrie at base of heart  Pulmonary:      Effort: Pulmonary effort is normal.      Breath sounds: Normal breath sounds. No wheezing or rales.   Abdominal:      Tenderness: There is no abdominal tenderness.   Musculoskeletal:      Right lower leg: No edema.      Left lower leg: No edema.   Skin:     General: Skin is warm.   Neurological:      General: No focal deficit present.   Psychiatric:         Mood and Affect: Mood normal.         Assessment & Plan   Diagnoses and all orders for this visit:    1. Coronary artery disease involving native coronary artery of native heart without angina pectoris (Primary)  Comments:  no angina  Orders:  -     ECG 12 Lead    2. Hypertension, benign  Comments:  controlled    3. Mixed " hyperlipidemia  Comments:  controlled    4. Paroxysmal atrial fibrillation (HCC)  Comments:  maintaining SR and on DOAC ok                 Return in about 1 year (around 11/16/2023).  Orders Placed This Encounter   Procedures   • ECG 12 Lead     Order Specific Question:   Reason for Exam:     Answer:   CAD, HTN, HLD     Order Specific Question:   Release to patient     Answer:   Routine Release

## 2022-11-29 ENCOUNTER — TELEPHONE (OUTPATIENT)
Dept: INTERNAL MEDICINE | Facility: CLINIC | Age: 87
End: 2022-11-29

## 2022-11-29 NOTE — TELEPHONE ENCOUNTER
Caller: Alka Nicolas    Relationship: Self    Best call back number: [phone]    Who is your current provider: PHILLY CHAVEZ    Additional notes: PATIENT STATES SHE WILL BE SWITCHING HER PRIMARY CARE FROM OUR OFFICE.

## 2023-04-03 DIAGNOSIS — I10 HYPERTENSION, BENIGN: ICD-10-CM

## 2023-04-03 RX ORDER — AMLODIPINE BESYLATE 5 MG/1
TABLET ORAL
Qty: 90 TABLET | Refills: 1 | Status: SHIPPED | OUTPATIENT
Start: 2023-04-03

## 2023-04-03 RX ORDER — CARVEDILOL 12.5 MG/1
TABLET ORAL
Qty: 60 TABLET | Refills: 2 | Status: SHIPPED | OUTPATIENT
Start: 2023-04-03

## 2023-05-02 DIAGNOSIS — I25.10 CORONARY ARTERY DISEASE INVOLVING NATIVE CORONARY ARTERY OF NATIVE HEART WITHOUT ANGINA PECTORIS: ICD-10-CM

## 2023-05-02 RX ORDER — ATORVASTATIN CALCIUM 40 MG/1
TABLET, FILM COATED ORAL
Qty: 90 TABLET | Refills: 4 | Status: SHIPPED | OUTPATIENT
Start: 2023-05-02

## 2023-10-20 ENCOUNTER — HOSPITAL ENCOUNTER (OUTPATIENT)
Dept: GENERAL RADIOLOGY | Facility: HOSPITAL | Age: 88
Discharge: HOME | End: 2023-10-20
Payer: MEDICARE

## 2023-10-20 PROCEDURE — 71046 X-RAY EXAM CHEST 2 VIEWS: CPT

## 2023-11-27 ENCOUNTER — OFFICE VISIT (OUTPATIENT)
Dept: CARDIOLOGY | Facility: CLINIC | Age: 88
End: 2023-11-27
Payer: MEDICARE

## 2023-11-27 VITALS
HEIGHT: 60 IN | SYSTOLIC BLOOD PRESSURE: 140 MMHG | WEIGHT: 140 LBS | DIASTOLIC BLOOD PRESSURE: 61 MMHG | BODY MASS INDEX: 27.48 KG/M2 | HEART RATE: 64 BPM

## 2023-11-27 DIAGNOSIS — I25.10 CORONARY ARTERY DISEASE INVOLVING NATIVE CORONARY ARTERY OF NATIVE HEART WITHOUT ANGINA PECTORIS: Primary | ICD-10-CM

## 2023-11-27 DIAGNOSIS — I10 HYPERTENSION, BENIGN: ICD-10-CM

## 2023-11-27 DIAGNOSIS — E78.2 MIXED HYPERLIPIDEMIA: ICD-10-CM

## 2023-11-27 PROCEDURE — 1159F MED LIST DOCD IN RCRD: CPT | Performed by: INTERNAL MEDICINE

## 2023-11-27 PROCEDURE — 93000 ELECTROCARDIOGRAM COMPLETE: CPT | Performed by: INTERNAL MEDICINE

## 2023-11-27 PROCEDURE — 1160F RVW MEDS BY RX/DR IN RCRD: CPT | Performed by: INTERNAL MEDICINE

## 2023-11-27 PROCEDURE — 99213 OFFICE O/P EST LOW 20 MIN: CPT | Performed by: INTERNAL MEDICINE

## 2023-11-27 NOTE — PROGRESS NOTES
"Subjective    Alka Salima Nicolas is a 88 y.o. female. FU of ihd and rhythm    History of Present Illness     INOCA:  Has good stamina for her age. \"I think that maybe sometimes when I get really tired by heart will ache slightly\". Stable meds and EKG today is nsc.    PAR AFIB:  No palpitations or spells of light-headedness. On a DOAC without any known internal bleeding.    HTN;  Home checks are \"140's/60's\"    HLD:  On potent statin and previous LDL's have been < 50. Checks are with her pcp.    The following portions of the patient's history were reviewed and updated as appropriate: allergies, current medications, past family history, past medical history, past social history, past surgical history and problem list.    Patient Active Problem List   Diagnosis    CAD (coronary artery disease)    Hypertension, benign    Hyperlipidemia    Palpitations    Hypothyroidism (acquired)    Psoriasis    Vitamin B12 deficiency    Grade 1 out of 6 intensity murmur    Unstable angina    COPD, mild    Osteoarthritis    Pernicious anemia    Acute kidney injury    Hypokalemia    Metabolic acidosis    Acute cystitis    Pneumonitis    Chronic kidney disease, stage IV (severe)    Elevated troponin    Failure to thrive in adult    Moderate malnutrition    Paroxysmal atrial fibrillation       No Known Allergies    Family History   Problem Relation Age of Onset    Diabetes Mother     Heart disease Father     Hyperlipidemia Father     Hypertension Father     Stroke Father     Breast cancer Neg Hx        Social History     Socioeconomic History    Marital status:    Tobacco Use    Smoking status: Former     Packs/day: .5     Types: Cigarettes     Quit date: 4/1/2020     Years since quitting: 3.6    Smokeless tobacco: Never   Vaping Use    Vaping Use: Never used   Substance and Sexual Activity    Alcohol use: No    Drug use: No    Sexual activity: Not Currently     Partners: Male         Current Outpatient Medications:     albuterol " sulfate  (90 Base) MCG/ACT inhaler, Inhale 2 puffs 4 (Four) Times a Day., Disp: 6.7 g, Rfl: 5    amLODIPine (NORVASC) 5 MG tablet, TAKE ONE TABLET BY MOUH DAILY, Disp: 90 tablet, Rfl: 1    amoxicillin (AMOXIL) 250 MG capsule, Take 1 capsule by mouth 2 (Two) Times a Day., Disp: , Rfl:     apixaban (ELIQUIS) 2.5 MG tablet tablet, Take 1 tablet by mouth Every 12 (Twelve) Hours. Indications: Atrial Fibrillation, Disp: 60 tablet, Rfl: 0    atorvastatin (LIPITOR) 40 MG tablet, TAKE ONE TABLET BY MOUTH EVERY NIGHT., Disp: 90 tablet, Rfl: 4    brompheniramine-pseudoephedrine-DM 30-2-10 MG/5ML syrup, Take 10 mL by mouth 4 (Four) Times a Day As Needed for Cough., Disp: 150 mL, Rfl: 0    carvedilol (COREG) 12.5 MG tablet, TAKE ONE TABLET BY MOUTH TWO TIMES A DAY., Disp: 60 tablet, Rfl: 11    clobetasol (TEMOVATE) 0.05 % cream, Apply  topically to the appropriate area as directed 2 (Two) Times a Day., Disp: , Rfl:     cloNIDine (CATAPRES) 0.1 MG tablet, Take 1 tablet by mouth Daily As Needed. For blood pressure > 180/90, Disp: , Rfl:     Fluticasone-Salmeterol (ADVAIR/WIXELA) 250-50 MCG/ACT DISKUS, Inhale 1 puff 2 (Two) Times a Day., Disp: 60 each, Rfl: 0    Hydrocortisone, Perianal, (ANUSOL-HC) 2.5 % rectal cream, Insert  into the rectum As Needed., Disp: , Rfl:     levothyroxine (SYNTHROID, LEVOTHROID) 100 MCG tablet, Take 1 tablet by mouth Daily., Disp: 90 tablet, Rfl: 0    Multiple Vitamins-Minerals (PRESERVISION AREDS 2+MULTI VIT PO), Take 1 capsule by mouth Daily., Disp: , Rfl:     potassium chloride (K-DUR,KLOR-CON) 10 MEQ CR tablet, Take 1 tablet by mouth Daily., Disp: , Rfl:     Past Surgical History:   Procedure Laterality Date    APPENDECTOMY      CARDIAC CATHETERIZATION      CARDIAC CATHETERIZATION Left 10/21/2020    Procedure: Cardiac Catheterization/Vascular Study;  Surgeon: Marcos Ratliff MD;  Location:  [facility] CATH INVASIVE LOCATION;  Service: Cardiology;  Laterality: Left;    CHOLECYSTECTOMY    "   HYSTERECTOMY      total       Review of Systems   Constitutional:  Negative for activity change, fatigue and unexpected weight change.   Respiratory:  Positive for shortness of breath. Negative for wheezing.    Cardiovascular:  Positive for chest pain. Negative for palpitations and leg swelling.   Gastrointestinal:  Negative for abdominal pain and blood in stool.   Genitourinary:  Negative for difficulty urinating and genital sores.   Hematological:  Bruises/bleeds easily.       /61   Pulse 64   Ht 152.4 cm (60\")   Wt 63.5 kg (140 lb)   LMP  (LMP Unknown)   BMI 27.34 kg/m²   Procedures    Objective   Physical Exam  Constitutional:       Appearance: Normal appearance.   Cardiovascular:      Rate and Rhythm: Normal rate and regular rhythm.      Pulses: Normal pulses.      Heart sounds: Normal heart sounds. No murmur heard.     No friction rub. No gallop.   Pulmonary:      Effort: Pulmonary effort is normal.      Breath sounds: Normal breath sounds. No wheezing or rales.   Abdominal:      General: Bowel sounds are normal.      Tenderness: There is no abdominal tenderness.   Musculoskeletal:      Right lower leg: No edema.      Left lower leg: No edema.   Skin:     General: Skin is warm.   Neurological:      General: No focal deficit present.   Psychiatric:         Mood and Affect: Mood normal.         Assessment & Plan   Diagnoses and all orders for this visit:    1. Coronary artery disease involving native coronary artery of native heart without angina pectoris (Primary)  Comments:  atypical cp - doubt angina  Orders:  -     ECG 12 Lead    2. Mixed hyperlipidemia  Comments:  good LDL control    3. Hypertension, benign  Comments:  fair control                 Return in about 1 year (around 11/27/2024) for Next scheduled follow up with Marty Peterson.  Orders Placed This Encounter   Procedures    ECG 12 Lead     Order Specific Question:   Reason for Exam:     Answer:   [email or web address]     Order Specific " Question:   Release to patient     Answer:   Routine Release [[id number]]

## 2023-12-18 ENCOUNTER — APPOINTMENT (OUTPATIENT)
Dept: GENERAL RADIOLOGY | Facility: HOSPITAL | Age: 88
End: 2023-12-18
Payer: MEDICARE

## 2023-12-18 PROBLEM — J06.9 UPPER RESPIRATORY TRACT INFECTION: Status: ACTIVE | Noted: 2023-12-18

## 2023-12-18 PROBLEM — N39.0 ACUTE UTI: Status: ACTIVE | Noted: 2023-12-18

## 2023-12-18 PROBLEM — R05.1 ACUTE COUGH: Status: ACTIVE | Noted: 2023-12-18

## 2023-12-18 PROCEDURE — 87186 SC STD MICRODIL/AGAR DIL: CPT | Performed by: NURSE PRACTITIONER

## 2023-12-18 PROCEDURE — 87088 URINE BACTERIA CULTURE: CPT | Performed by: NURSE PRACTITIONER

## 2023-12-18 PROCEDURE — 87086 URINE CULTURE/COLONY COUNT: CPT | Performed by: NURSE PRACTITIONER

## 2023-12-18 PROCEDURE — 71046 X-RAY EXAM CHEST 2 VIEWS: CPT

## 2024-01-01 ENCOUNTER — TELEPHONE (OUTPATIENT)
Dept: ONCOLOGY | Facility: CLINIC | Age: 89
End: 2024-01-01

## 2024-01-01 ENCOUNTER — APPOINTMENT (OUTPATIENT)
Dept: CT IMAGING | Facility: HOSPITAL | Age: 89
DRG: 683 | End: 2024-01-01
Payer: MEDICARE

## 2024-01-01 ENCOUNTER — TELEPHONE (OUTPATIENT)
Dept: ONCOLOGY | Facility: CLINIC | Age: 89
End: 2024-01-01
Payer: MEDICARE

## 2024-01-01 ENCOUNTER — INFUSION (OUTPATIENT)
Dept: ONCOLOGY | Facility: HOSPITAL | Age: 89
End: 2024-01-01
Payer: MEDICARE

## 2024-01-01 ENCOUNTER — LAB (OUTPATIENT)
Dept: LAB | Facility: HOSPITAL | Age: 89
End: 2024-01-01
Payer: MEDICARE

## 2024-01-01 ENCOUNTER — HOSPITAL ENCOUNTER (INPATIENT)
Facility: HOSPITAL | Age: 89
LOS: 2 days | Discharge: HOSPICE | DRG: 683 | End: 2024-12-12
Attending: EMERGENCY MEDICINE | Admitting: INTERNAL MEDICINE
Payer: MEDICARE

## 2024-01-01 VITALS
RESPIRATION RATE: 18 BRPM | TEMPERATURE: 97.6 F | HEIGHT: 59 IN | BODY MASS INDEX: 26.45 KG/M2 | OXYGEN SATURATION: 100 % | DIASTOLIC BLOOD PRESSURE: 50 MMHG | SYSTOLIC BLOOD PRESSURE: 94 MMHG | WEIGHT: 131.2 LBS | HEART RATE: 63 BPM

## 2024-01-01 VITALS
WEIGHT: 131.17 LBS | BODY MASS INDEX: 26.44 KG/M2 | DIASTOLIC BLOOD PRESSURE: 32 MMHG | TEMPERATURE: 97.6 F | HEIGHT: 59 IN | SYSTOLIC BLOOD PRESSURE: 79 MMHG | RESPIRATION RATE: 15 BRPM | HEART RATE: 70 BPM | OXYGEN SATURATION: 99 %

## 2024-01-01 DIAGNOSIS — Z51.5 END OF LIFE CARE: ICD-10-CM

## 2024-01-01 DIAGNOSIS — N17.9 ACUTE KIDNEY INJURY SUPERIMPOSED ON CHRONIC KIDNEY DISEASE: Primary | ICD-10-CM

## 2024-01-01 DIAGNOSIS — E87.6 HYPOKALEMIA: ICD-10-CM

## 2024-01-01 DIAGNOSIS — C34.90 MALIGNANT NEOPLASM OF LUNG, UNSPECIFIED LATERALITY, UNSPECIFIED PART OF LUNG: ICD-10-CM

## 2024-01-01 DIAGNOSIS — N18.9 ACUTE KIDNEY INJURY SUPERIMPOSED ON CHRONIC KIDNEY DISEASE: Primary | ICD-10-CM

## 2024-01-01 DIAGNOSIS — R17 TOTAL BILIRUBIN, ELEVATED: ICD-10-CM

## 2024-01-01 DIAGNOSIS — E87.1 HYPONATREMIA: ICD-10-CM

## 2024-01-01 DIAGNOSIS — R74.01 TRANSAMINITIS: ICD-10-CM

## 2024-01-01 DIAGNOSIS — C34.80 SMALL CELL CARCINOMA OF OVERLAPPING SITES OF LUNG, UNSPECIFIED LATERALITY: Primary | ICD-10-CM

## 2024-01-01 DIAGNOSIS — C34.81 SMALL CELL CARCINOMA OF OVERLAPPING SITES OF RIGHT LUNG: ICD-10-CM

## 2024-01-01 LAB
ALBUMIN SERPL-MCNC: 3.3 G/DL (ref 3.5–5.2)
ALBUMIN/GLOB SERPL: 1.8 G/DL
ALP SERPL-CCNC: 428 U/L (ref 39–117)
ALT SERPL W P-5'-P-CCNC: 145 U/L (ref 1–33)
ANION GAP SERPL CALCULATED.3IONS-SCNC: 13 MMOL/L (ref 5–15)
AST SERPL-CCNC: 201 U/L (ref 1–32)
BASOPHILS # BLD AUTO: 0 10*3/MM3 (ref 0–0.2)
BASOPHILS NFR BLD AUTO: 0 % (ref 0–1.5)
BILIRUB CONJ SERPL-MCNC: 1.6 MG/DL (ref 0–0.3)
BILIRUB INDIRECT SERPL-MCNC: 0.5 MG/DL
BILIRUB SERPL-MCNC: 2.1 MG/DL (ref 0–1.2)
BILIRUB SERPL-MCNC: 2.2 MG/DL (ref 0–1.2)
BUN SERPL-MCNC: 59 MG/DL (ref 8–23)
BUN/CREAT SERPL: 19.7 (ref 7–25)
CALCIUM SPEC-SCNC: 7.3 MG/DL (ref 8.6–10.5)
CHLORIDE SERPL-SCNC: 97 MMOL/L (ref 98–107)
CO2 SERPL-SCNC: 12 MMOL/L (ref 22–29)
CREAT SERPL-MCNC: 3 MG/DL (ref 0.57–1)
DEPRECATED RDW RBC AUTO: 43.3 FL (ref 37–54)
EGFRCR SERPLBLD CKD-EPI 2021: 14.4 ML/MIN/1.73
EOSINOPHIL # BLD AUTO: 0 10*3/MM3 (ref 0–0.4)
EOSINOPHIL NFR BLD AUTO: 0 % (ref 0.3–6.2)
ERYTHROCYTE [DISTWIDTH] IN BLOOD BY AUTOMATED COUNT: 14.4 % (ref 12.3–15.4)
GLOBULIN UR ELPH-MCNC: 1.8 GM/DL
GLUCOSE SERPL-MCNC: 117 MG/DL (ref 65–99)
HCT VFR BLD AUTO: 24.7 % (ref 34–46.6)
HGB BLD-MCNC: 8.5 G/DL (ref 12–15.9)
IMM GRANULOCYTES # BLD AUTO: 0.02 10*3/MM3 (ref 0–0.05)
IMM GRANULOCYTES NFR BLD AUTO: 0.5 % (ref 0–0.5)
LYMPHOCYTES # BLD AUTO: 0.37 10*3/MM3 (ref 0.7–3.1)
LYMPHOCYTES NFR BLD AUTO: 9.6 % (ref 19.6–45.3)
MAGNESIUM SERPL-MCNC: 1.9 MG/DL (ref 1.6–2.4)
MCH RBC QN AUTO: 28.4 PG (ref 26.6–33)
MCHC RBC AUTO-ENTMCNC: 34.4 G/DL (ref 31.5–35.7)
MCV RBC AUTO: 82.6 FL (ref 79–97)
MONOCYTES # BLD AUTO: 0.26 10*3/MM3 (ref 0.1–0.9)
MONOCYTES NFR BLD AUTO: 6.7 % (ref 5–12)
NEUTROPHILS NFR BLD AUTO: 3.21 10*3/MM3 (ref 1.7–7)
NEUTROPHILS NFR BLD AUTO: 83.2 % (ref 42.7–76)
NRBC BLD AUTO-RTO: 0 /100 WBC (ref 0–0.2)
PLATELET # BLD AUTO: 103 10*3/MM3 (ref 140–450)
PMV BLD AUTO: 10.6 FL (ref 6–12)
POTASSIUM SERPL-SCNC: 3.3 MMOL/L (ref 3.5–5.2)
PROT SERPL-MCNC: 5.1 G/DL (ref 6–8.5)
RBC # BLD AUTO: 2.99 10*6/MM3 (ref 3.77–5.28)
SODIUM SERPL-SCNC: 122 MMOL/L (ref 136–145)
WBC NRBC COR # BLD AUTO: 3.86 10*3/MM3 (ref 3.4–10.8)

## 2024-01-01 PROCEDURE — G0463 HOSPITAL OUTPT CLINIC VISIT: HCPCS

## 2024-01-01 PROCEDURE — 25010000002 MORPHINE PER 10 MG: Performed by: INTERNAL MEDICINE

## 2024-01-01 PROCEDURE — 36415 COLL VENOUS BLD VENIPUNCTURE: CPT

## 2024-01-01 PROCEDURE — 25010000002 MORPHINE PER 10 MG: Performed by: EMERGENCY MEDICINE

## 2024-01-01 PROCEDURE — 83735 ASSAY OF MAGNESIUM: CPT

## 2024-01-01 PROCEDURE — 25010000002 ONDANSETRON PER 1 MG: Performed by: EMERGENCY MEDICINE

## 2024-01-01 PROCEDURE — 82248 BILIRUBIN DIRECT: CPT | Performed by: EMERGENCY MEDICINE

## 2024-01-01 PROCEDURE — 82247 BILIRUBIN TOTAL: CPT | Performed by: EMERGENCY MEDICINE

## 2024-01-01 PROCEDURE — 25010000002 LORAZEPAM PER 2 MG: Performed by: INTERNAL MEDICINE

## 2024-01-01 PROCEDURE — 74176 CT ABD & PELVIS W/O CONTRAST: CPT

## 2024-01-01 PROCEDURE — 80053 COMPREHEN METABOLIC PANEL: CPT

## 2024-01-01 PROCEDURE — 51702 INSERT TEMP BLADDER CATH: CPT

## 2024-01-01 PROCEDURE — 99285 EMERGENCY DEPT VISIT HI MDM: CPT

## 2024-01-01 PROCEDURE — 25810000003 SODIUM CHLORIDE 0.9 % SOLUTION: Performed by: EMERGENCY MEDICINE

## 2024-01-01 PROCEDURE — 85025 COMPLETE CBC W/AUTO DIFF WBC: CPT

## 2024-01-01 PROCEDURE — 25010000002 FUROSEMIDE PER 20 MG: Performed by: INTERNAL MEDICINE

## 2024-01-01 RX ORDER — PROCHLORPERAZINE MALEATE 10 MG
10 TABLET ORAL EVERY 6 HOURS PRN
Status: DISCONTINUED | OUTPATIENT
Start: 2024-01-01 | End: 2024-01-01 | Stop reason: HOSPADM

## 2024-01-01 RX ORDER — LORAZEPAM 2 MG/ML
1 INJECTION INTRAMUSCULAR
Status: DISCONTINUED | OUTPATIENT
Start: 2024-01-01 | End: 2024-01-01 | Stop reason: HOSPADM

## 2024-01-01 RX ORDER — POTASSIUM CHLORIDE 7.45 MG/ML
10 INJECTION INTRAVENOUS ONCE
Status: DISCONTINUED | OUTPATIENT
Start: 2024-01-01 | End: 2024-01-01

## 2024-01-01 RX ORDER — FUROSEMIDE 10 MG/ML
60 INJECTION INTRAMUSCULAR; INTRAVENOUS ONCE
Status: COMPLETED | OUTPATIENT
Start: 2024-01-01 | End: 2024-01-01

## 2024-01-01 RX ORDER — LORAZEPAM 1 MG/1
1 TABLET ORAL
Status: DISCONTINUED | OUTPATIENT
Start: 2024-01-01 | End: 2024-01-01 | Stop reason: HOSPADM

## 2024-01-01 RX ORDER — LORAZEPAM 2 MG/ML
0.5 CONCENTRATE ORAL
Status: DISCONTINUED | OUTPATIENT
Start: 2024-01-01 | End: 2024-01-01 | Stop reason: HOSPADM

## 2024-01-01 RX ORDER — MORPHINE SULFATE 2 MG/ML
2 INJECTION, SOLUTION INTRAMUSCULAR; INTRAVENOUS
Status: DISCONTINUED | OUTPATIENT
Start: 2024-01-01 | End: 2024-01-01 | Stop reason: HOSPADM

## 2024-01-01 RX ORDER — ONDANSETRON 2 MG/ML
4 INJECTION INTRAMUSCULAR; INTRAVENOUS EVERY 6 HOURS PRN
Status: DISCONTINUED | OUTPATIENT
Start: 2024-01-01 | End: 2024-01-01 | Stop reason: HOSPADM

## 2024-01-01 RX ORDER — LORAZEPAM 1 MG/1
2 TABLET ORAL
Status: DISCONTINUED | OUTPATIENT
Start: 2024-01-01 | End: 2024-01-01 | Stop reason: HOSPADM

## 2024-01-01 RX ORDER — MORPHINE SULFATE 20 MG/ML
20 SOLUTION ORAL
Status: DISCONTINUED | OUTPATIENT
Start: 2024-01-01 | End: 2024-01-01 | Stop reason: HOSPADM

## 2024-01-01 RX ORDER — MORPHINE SULFATE 20 MG/ML
10 SOLUTION ORAL
Status: DISCONTINUED | OUTPATIENT
Start: 2024-01-01 | End: 2024-01-01 | Stop reason: HOSPADM

## 2024-01-01 RX ORDER — LORAZEPAM 0.5 MG/1
0.5 TABLET ORAL
Status: DISCONTINUED | OUTPATIENT
Start: 2024-01-01 | End: 2024-01-01 | Stop reason: HOSPADM

## 2024-01-01 RX ORDER — LORAZEPAM 2 MG/ML
2 INJECTION INTRAMUSCULAR
Status: DISCONTINUED | OUTPATIENT
Start: 2024-01-01 | End: 2024-01-01 | Stop reason: HOSPADM

## 2024-01-01 RX ORDER — LORAZEPAM 2 MG/ML
0.5 INJECTION INTRAMUSCULAR
Status: DISCONTINUED | OUTPATIENT
Start: 2024-01-01 | End: 2024-01-01 | Stop reason: HOSPADM

## 2024-01-01 RX ORDER — ONDANSETRON 2 MG/ML
4 INJECTION INTRAMUSCULAR; INTRAVENOUS ONCE
Status: COMPLETED | OUTPATIENT
Start: 2024-01-01 | End: 2024-01-01

## 2024-01-01 RX ORDER — ONDANSETRON 4 MG/1
4 TABLET, ORALLY DISINTEGRATING ORAL EVERY 6 HOURS PRN
Status: DISCONTINUED | OUTPATIENT
Start: 2024-01-01 | End: 2024-01-01 | Stop reason: HOSPADM

## 2024-01-01 RX ORDER — MORPHINE SULFATE 20 MG/ML
10 SOLUTION ORAL EVERY 4 HOURS PRN
Qty: 30 ML | Refills: 0 | Status: SHIPPED | OUTPATIENT
Start: 2024-01-01 | End: 2024-12-18

## 2024-01-01 RX ORDER — ALBUTEROL SULFATE 0.83 MG/ML
2.5 SOLUTION RESPIRATORY (INHALATION) EVERY 6 HOURS PRN
Status: DISCONTINUED | OUTPATIENT
Start: 2024-01-01 | End: 2024-01-01 | Stop reason: HOSPADM

## 2024-01-01 RX ORDER — MORPHINE SULFATE 2 MG/ML
2 INJECTION, SOLUTION INTRAMUSCULAR; INTRAVENOUS ONCE
Status: COMPLETED | OUTPATIENT
Start: 2024-01-01 | End: 2024-01-01

## 2024-01-01 RX ORDER — DIPHENOXYLATE HYDROCHLORIDE AND ATROPINE SULFATE 2.5; .025 MG/1; MG/1
1 TABLET ORAL
Status: DISCONTINUED | OUTPATIENT
Start: 2024-01-01 | End: 2024-01-01 | Stop reason: HOSPADM

## 2024-01-01 RX ORDER — SODIUM CHLORIDE 9 MG/ML
125 INJECTION, SOLUTION INTRAVENOUS CONTINUOUS
Status: DISCONTINUED | OUTPATIENT
Start: 2024-01-01 | End: 2024-01-01

## 2024-01-01 RX ORDER — ALBUTEROL SULFATE 90 UG/1
2 INHALANT RESPIRATORY (INHALATION)
Status: DISCONTINUED | OUTPATIENT
Start: 2024-01-01 | End: 2024-01-01 | Stop reason: ALTCHOICE

## 2024-01-01 RX ORDER — FUROSEMIDE 10 MG/ML
20 INJECTION INTRAMUSCULAR; INTRAVENOUS EVERY 6 HOURS PRN
Status: DISCONTINUED | OUTPATIENT
Start: 2024-01-01 | End: 2024-01-01 | Stop reason: HOSPADM

## 2024-01-01 RX ORDER — ALBUTEROL SULFATE 0.83 MG/ML
2.5 SOLUTION RESPIRATORY (INHALATION)
Status: DISCONTINUED | OUTPATIENT
Start: 2024-01-01 | End: 2024-01-01

## 2024-01-01 RX ORDER — ATROPINE SULFATE 10 MG/ML
2 SOLUTION/ DROPS OPHTHALMIC 2 TIMES DAILY PRN
Status: DISCONTINUED | OUTPATIENT
Start: 2024-01-01 | End: 2024-01-01 | Stop reason: HOSPADM

## 2024-01-01 RX ORDER — LORAZEPAM 2 MG/ML
0.5 CONCENTRATE ORAL
Qty: 30 ML | Refills: 0 | Status: SHIPPED | OUTPATIENT
Start: 2024-01-01 | End: 2024-12-18

## 2024-01-01 RX ORDER — LORAZEPAM 2 MG/ML
1 CONCENTRATE ORAL
Status: DISCONTINUED | OUTPATIENT
Start: 2024-01-01 | End: 2024-01-01 | Stop reason: HOSPADM

## 2024-01-01 RX ORDER — LORAZEPAM 2 MG/ML
2 CONCENTRATE ORAL
Status: DISCONTINUED | OUTPATIENT
Start: 2024-01-01 | End: 2024-01-01 | Stop reason: HOSPADM

## 2024-01-01 RX ORDER — SCOLOPAMINE TRANSDERMAL SYSTEM 1 MG/1
1 PATCH, EXTENDED RELEASE TRANSDERMAL
Status: DISCONTINUED | OUTPATIENT
Start: 2024-01-01 | End: 2024-01-01 | Stop reason: HOSPADM

## 2024-01-01 RX ADMIN — MORPHINE SULFATE 20 MG: 20 SOLUTION ORAL at 12:01

## 2024-01-01 RX ADMIN — ONDANSETRON 4 MG: 2 INJECTION INTRAMUSCULAR; INTRAVENOUS at 11:32

## 2024-01-01 RX ADMIN — LORAZEPAM 0.5 MG: 2 SOLUTION, CONCENTRATE ORAL at 13:19

## 2024-01-01 RX ADMIN — LORAZEPAM 2 MG: 2 SOLUTION, CONCENTRATE ORAL at 08:54

## 2024-01-01 RX ADMIN — SODIUM CHLORIDE 125 ML/HR: 9 INJECTION, SOLUTION INTRAVENOUS at 11:24

## 2024-01-01 RX ADMIN — SODIUM CHLORIDE 1000 ML: 9 INJECTION, SOLUTION INTRAVENOUS at 10:30

## 2024-01-01 RX ADMIN — MORPHINE SULFATE 10 MG: 20 SOLUTION ORAL at 13:19

## 2024-01-01 RX ADMIN — FUROSEMIDE 60 MG: 10 INJECTION, SOLUTION INTRAVENOUS at 13:45

## 2024-01-01 RX ADMIN — MORPHINE SULFATE 4 MG: 4 INJECTION, SOLUTION INTRAMUSCULAR; INTRAVENOUS at 14:00

## 2024-01-01 RX ADMIN — MORPHINE SULFATE 10 MG: 20 SOLUTION ORAL at 11:43

## 2024-01-01 RX ADMIN — MORPHINE SULFATE 10 MG: 20 SOLUTION ORAL at 09:28

## 2024-01-01 RX ADMIN — MORPHINE SULFATE 2 MG: 2 INJECTION, SOLUTION INTRAMUSCULAR; INTRAVENOUS at 11:32

## 2024-01-01 RX ADMIN — SODIUM BICARBONATE 150 MEQ: 84 INJECTION INTRAVENOUS at 11:28

## 2024-01-01 RX ADMIN — LORAZEPAM 1 MG: 2 INJECTION INTRAMUSCULAR; INTRAVENOUS at 14:00

## 2024-01-01 RX ADMIN — LORAZEPAM 0.5 MG: 2 SOLUTION, CONCENTRATE ORAL at 08:40

## 2024-04-10 ENCOUNTER — APPOINTMENT (OUTPATIENT)
Dept: CT IMAGING | Facility: HOSPITAL | Age: 89
End: 2024-04-10
Payer: MEDICARE

## 2024-04-10 ENCOUNTER — HOSPITAL ENCOUNTER (EMERGENCY)
Facility: HOSPITAL | Age: 89
Discharge: HOME | End: 2024-04-10
Attending: EMERGENCY MEDICINE | Admitting: EMERGENCY MEDICINE
Payer: MEDICARE

## 2024-04-10 ENCOUNTER — APPOINTMENT (OUTPATIENT)
Dept: GENERAL RADIOLOGY | Facility: HOSPITAL | Age: 89
End: 2024-04-10
Payer: MEDICARE

## 2024-04-10 VITALS
HEART RATE: 58 BPM | WEIGHT: 131 LBS | RESPIRATION RATE: 20 BRPM | HEIGHT: 60 IN | BODY MASS INDEX: 25.72 KG/M2 | OXYGEN SATURATION: 99 % | TEMPERATURE: 98.4 F | DIASTOLIC BLOOD PRESSURE: 78 MMHG | SYSTOLIC BLOOD PRESSURE: 149 MMHG

## 2024-04-10 DIAGNOSIS — R09.1 PLEURISY: Primary | ICD-10-CM

## 2024-04-10 DIAGNOSIS — R91.1 NODULE OF UPPER LOBE OF RIGHT LUNG: ICD-10-CM

## 2024-04-10 LAB
ALBUMIN SERPL-MCNC: 4.1 G/DL (ref 3.5–5.2)
ALBUMIN/GLOB SERPL: 1.6 G/DL
ALP SERPL-CCNC: 144 U/L (ref 39–117)
ALT SERPL W P-5'-P-CCNC: 11 U/L (ref 1–33)
ANION GAP SERPL CALCULATED.3IONS-SCNC: 9 MMOL/L (ref 5–15)
AST SERPL-CCNC: 13 U/L (ref 1–32)
BASOPHILS # BLD AUTO: 0.02 10*3/MM3 (ref 0–0.2)
BASOPHILS NFR BLD AUTO: 0.2 % (ref 0–1.5)
BILIRUB SERPL-MCNC: 0.6 MG/DL (ref 0–1.2)
BUN SERPL-MCNC: 19 MG/DL (ref 8–23)
BUN/CREAT SERPL: 18.1 (ref 7–25)
CALCIUM SPEC-SCNC: 9.3 MG/DL (ref 8.6–10.5)
CHLORIDE SERPL-SCNC: 107 MMOL/L (ref 98–107)
CO2 SERPL-SCNC: 26 MMOL/L (ref 22–29)
CREAT SERPL-MCNC: 1.05 MG/DL (ref 0.57–1)
D DIMER PPP FEU-MCNC: 0.34 MCGFEU/ML (ref 0–0.88)
DEPRECATED RDW RBC AUTO: 41.3 FL (ref 37–54)
EGFRCR SERPLBLD CKD-EPI 2021: 51.2 ML/MIN/1.73
EOSINOPHIL # BLD AUTO: 0.29 10*3/MM3 (ref 0–0.4)
EOSINOPHIL NFR BLD AUTO: 3.6 % (ref 0.3–6.2)
ERYTHROCYTE [DISTWIDTH] IN BLOOD BY AUTOMATED COUNT: 12.8 % (ref 12.3–15.4)
GEN 5 2HR TROPONIN T REFLEX: 17 NG/L
GLOBULIN UR ELPH-MCNC: 2.6 GM/DL
GLUCOSE SERPL-MCNC: 119 MG/DL (ref 65–99)
HCT VFR BLD AUTO: 38.4 % (ref 34–46.6)
HGB BLD-MCNC: 12.3 G/DL (ref 12–15.9)
IMM GRANULOCYTES # BLD AUTO: 0.04 10*3/MM3 (ref 0–0.05)
IMM GRANULOCYTES NFR BLD AUTO: 0.5 % (ref 0–0.5)
LYMPHOCYTES # BLD AUTO: 1.41 10*3/MM3 (ref 0.7–3.1)
LYMPHOCYTES NFR BLD AUTO: 17.4 % (ref 19.6–45.3)
MAGNESIUM SERPL-MCNC: 2.1 MG/DL (ref 1.6–2.4)
MCH RBC QN AUTO: 28.4 PG (ref 26.6–33)
MCHC RBC AUTO-ENTMCNC: 32 G/DL (ref 31.5–35.7)
MCV RBC AUTO: 88.7 FL (ref 79–97)
MONOCYTES # BLD AUTO: 0.46 10*3/MM3 (ref 0.1–0.9)
MONOCYTES NFR BLD AUTO: 5.7 % (ref 5–12)
NEUTROPHILS NFR BLD AUTO: 5.89 10*3/MM3 (ref 1.7–7)
NEUTROPHILS NFR BLD AUTO: 72.6 % (ref 42.7–76)
NRBC BLD AUTO-RTO: 0 /100 WBC (ref 0–0.2)
NT-PROBNP SERPL-MCNC: 550.1 PG/ML (ref 0–1800)
PLATELET # BLD AUTO: 151 10*3/MM3 (ref 140–450)
PMV BLD AUTO: 9.9 FL (ref 6–12)
POTASSIUM SERPL-SCNC: 4.1 MMOL/L (ref 3.5–5.2)
PROT SERPL-MCNC: 6.7 G/DL (ref 6–8.5)
RBC # BLD AUTO: 4.33 10*6/MM3 (ref 3.77–5.28)
SODIUM SERPL-SCNC: 142 MMOL/L (ref 136–145)
TROPONIN T DELTA: -3 NG/L
TROPONIN T SERPL HS-MCNC: 20 NG/L
WBC NRBC COR # BLD AUTO: 8.11 10*3/MM3 (ref 3.4–10.8)

## 2024-04-10 PROCEDURE — 85379 FIBRIN DEGRADATION QUANT: CPT | Performed by: EMERGENCY MEDICINE

## 2024-04-10 PROCEDURE — 83880 ASSAY OF NATRIURETIC PEPTIDE: CPT | Performed by: EMERGENCY MEDICINE

## 2024-04-10 PROCEDURE — 71275 CT ANGIOGRAPHY CHEST: CPT

## 2024-04-10 PROCEDURE — 85025 COMPLETE CBC W/AUTO DIFF WBC: CPT | Performed by: EMERGENCY MEDICINE

## 2024-04-10 PROCEDURE — 93005 ELECTROCARDIOGRAM TRACING: CPT | Performed by: EMERGENCY MEDICINE

## 2024-04-10 PROCEDURE — 71045 X-RAY EXAM CHEST 1 VIEW: CPT

## 2024-04-10 PROCEDURE — 36415 COLL VENOUS BLD VENIPUNCTURE: CPT

## 2024-04-10 PROCEDURE — 25510000001 IOPAMIDOL PER 1 ML: Performed by: EMERGENCY MEDICINE

## 2024-04-10 PROCEDURE — 80053 COMPREHEN METABOLIC PANEL: CPT | Performed by: EMERGENCY MEDICINE

## 2024-04-10 PROCEDURE — 93010 ELECTROCARDIOGRAM REPORT: CPT | Performed by: HOSPITALIST

## 2024-04-10 PROCEDURE — 83735 ASSAY OF MAGNESIUM: CPT | Performed by: EMERGENCY MEDICINE

## 2024-04-10 PROCEDURE — 84484 ASSAY OF TROPONIN QUANT: CPT | Performed by: EMERGENCY MEDICINE

## 2024-04-10 PROCEDURE — 99285 EMERGENCY DEPT VISIT HI MDM: CPT

## 2024-04-10 RX ORDER — LEVOFLOXACIN 500 MG/1
500 TABLET, FILM COATED ORAL DAILY
Qty: 7 TABLET | Refills: 0 | Status: SHIPPED | OUTPATIENT
Start: 2024-04-10

## 2024-04-10 RX ORDER — SODIUM CHLORIDE 0.9 % (FLUSH) 0.9 %
10 SYRINGE (ML) INJECTION AS NEEDED
Status: DISCONTINUED | OUTPATIENT
Start: 2024-04-10 | End: 2024-04-10 | Stop reason: HOSPADM

## 2024-04-10 RX ORDER — INDOMETHACIN 25 MG/1
25 CAPSULE ORAL
Qty: 21 CAPSULE | Refills: 0 | Status: SHIPPED | OUTPATIENT
Start: 2024-04-10

## 2024-04-10 RX ADMIN — IOPAMIDOL 100 ML: 755 INJECTION, SOLUTION INTRAVENOUS at 12:25

## 2024-04-10 NOTE — ED PROVIDER NOTES
Subjective   History of Present Illness  Patient presents with complaint of chest pain.  She says it started last night and has been pretty constant all night.  It goes toward her left shoulder.  Pain goes to her left upper back area at the same time.  It seems worse when she raises her arm and causes more pain in her shoulder area and also some when she breathes deeply.  She denies any nausea or vomiting or diaphoresis or true shortness of breath.  She does have a history of a stent so she is aware this may related to her heart.  She has recently had a sinus infection and with a round of steroids and antibiotics.    History provided by:  Patient   used: No    Chest Pain  Pain location:  Substernal area  Pain quality: aching and dull    Pain radiates to:  L shoulder  Pain severity:  Moderate  Onset quality:  Gradual  Duration:  12 hours  Timing:  Constant  Progression:  Worsening  Chronicity:  New  Context: breathing and movement    Context: not drug use, not eating, not intercourse, not lifting, not raising an arm, not at rest, not stress and not trauma    Relieved by:  Nothing  Worsened by:  Nothing  Ineffective treatments:  None tried  Associated symptoms: no abdominal pain, no AICD problem, no altered mental status, no anorexia, no anxiety, no back pain, no claudication, no cough, no diaphoresis, no dizziness, no dysphagia, no fatigue, no fever, no headache, no heartburn, no lower extremity edema, no nausea, no near-syncope, no numbness, no orthopnea, no palpitations, no PND, no shortness of breath, no syncope, no vomiting and no weakness    Risk factors: coronary artery disease    Risk factors: no aortic disease, no birth control, no diabetes mellitus, no Lauryn-Danlos syndrome, no high cholesterol, no hypertension, no immobilization, not male, no Marfan's syndrome, not obese, not pregnant, no prior DVT/PE, no smoking and no surgery        Review of Systems   Constitutional: Negative.   Negative for diaphoresis, fatigue and fever.   HENT: Negative.  Negative for trouble swallowing.    Respiratory: Negative.  Negative for cough and shortness of breath.    Cardiovascular: Negative.  Positive for chest pain. Negative for palpitations, orthopnea, claudication, syncope, PND and near-syncope.   Gastrointestinal: Negative.  Negative for abdominal pain, anorexia, heartburn, nausea and vomiting.   Genitourinary: Negative.    Musculoskeletal: Negative.  Negative for back pain.   Skin: Negative.    Neurological: Negative.  Negative for dizziness, weakness, numbness and headaches.   Psychiatric/Behavioral: Negative.     All other systems reviewed and are negative.      Past Medical History:   Diagnosis Date    Abdominal aortic aneurysm     Angina pectoris     Atherosclerosis of native coronary artery of native heart without angina pectoris     CAD (coronary artery disease)     Esophageal reflux     GERD (gastroesophageal reflux disease)     History of PTCA     Hyperlipidemia     Hypertension     Hypertension, essential, benign     Tobacco use disorder        No Known Allergies    Past Surgical History:   Procedure Laterality Date    APPENDECTOMY      CARDIAC CATHETERIZATION      CARDIAC CATHETERIZATION Left 10/21/2020    Procedure: Cardiac Catheterization/Vascular Study;  Surgeon: Marcos Ratliff MD;  Location: [facility] CATH INVASIVE LOCATION;  Service: Cardiology;  Laterality: Left;    CHOLECYSTECTOMY      HYSTERECTOMY      total       Family History   Problem Relation Age of Onset    Diabetes Mother     Heart disease Father     Hyperlipidemia Father     Hypertension Father     Stroke Father     Breast cancer Neg Hx        Social History     Socioeconomic History    Marital status:    Tobacco Use    Smoking status: Some Days     Current packs/day: 0.00     Types: Cigarettes     Last attempt to quit: 2020     Years since quittin.0    Smokeless tobacco: Never   Vaping Use    Vaping status:  Never Used   Substance and Sexual Activity    Alcohol use: No    Drug use: No    Sexual activity: Not Currently     Partners: Male       Prior to Admission medications    Medication Sig Start Date End Date Taking? Authorizing Provider   albuterol sulfate  (90 Base) MCG/ACT inhaler Inhale 2 puffs 4 (Four) Times a Day. 11/3/20   Alexis Rubio MD   amLODIPine (NORVASC) 5 MG tablet TAKE ONE TABLET BY MOUH DAILY 4/3/23   Marcos Ratliff MD   apixaban (ELIQUIS) 2.5 MG tablet tablet Take 1 tablet by mouth Every 12 (Twelve) Hours. Indications: Atrial Fibrillation 10/24/21   Gustavo Miller DO   atorvastatin (LIPITOR) 40 MG tablet TAKE ONE TABLET BY MOUTH EVERY NIGHT. 5/2/23   Marcos Ratliff MD   carvedilol (COREG) 12.5 MG tablet TAKE ONE TABLET BY MOUTH TWO TIMES A DAY. 7/3/23   Marcos Ratliff MD   clobetasol (TEMOVATE) 0.05 % cream Apply  topically to the appropriate area as directed 2 (Two) Times a Day.    Lynnette Valdes MD   cloNIDine (CATAPRES) 0.1 MG tablet Take 1 tablet by mouth Daily As Needed. For blood pressure > 180/90    Lynnette Vadles MD   guaifenesin (ROBITUSSIN) 100 MG/5ML liquid Take 10 mL by mouth Every 4 (Four) Hours As Needed for Cough or Congestion. 12/18/23   Samantha Morales APRN   Hydrocortisone, Perianal, (ANUSOL-HC) 2.5 % rectal cream Insert  into the rectum As Needed.    Lynnette Valdes MD   levothyroxine (SYNTHROID, LEVOTHROID) 100 MCG tablet Take 1 tablet by mouth Daily. 3/28/22   Helen Severino APRN   Multiple Vitamins-Minerals (PRESERVISION AREDS 2+MULTI VIT PO) Take 1 capsule by mouth Daily.    Lynnette Valdes MD   potassium chloride (K-DUR,KLOR-CON) 10 MEQ CR tablet Take 1 tablet by mouth Daily. 9/21/23   Lynnette Valdes MD   predniSONE (DELTASONE) 10 MG (21) dose pack Use as directed on package 3/27/24   Mikayla Godinez APRN       Medications   iopamidol (ISOVUE-370) 76 % injection 100 mL (100 mL Intravenous Given  4/10/24 1225)       Vitals:    04/10/24 1348   BP: 149/78   Pulse: 58   Resp: 20   Temp:    SpO2: 99%         Objective   Physical Exam  Vitals and nursing note reviewed.   Constitutional:       Appearance: She is well-developed.   HENT:      Head: Normocephalic and atraumatic.   Cardiovascular:      Rate and Rhythm: Normal rate and regular rhythm.   Pulmonary:      Effort: Pulmonary effort is normal.      Breath sounds: Normal breath sounds.   Abdominal:      General: Bowel sounds are normal.      Palpations: Abdomen is soft.   Musculoskeletal:         General: Normal range of motion.      Cervical back: Normal range of motion and neck supple.   Skin:     General: Skin is warm and dry.   Neurological:      General: No focal deficit present.      Mental Status: She is alert and oriented to person, place, and time.   Psychiatric:         Mood and Affect: Mood normal.         Behavior: Behavior normal.         Procedures         Lab Results (last 24 hours)       Procedure Component Value Units Date/Time    CBC & Differential [[id number]]  (Abnormal) Collected: 04/10/24 1039    Specimen: Blood Updated: 04/10/24 1052    Narrative:      The following orders were created for panel order CBC & Differential.  Procedure                               Abnormality         Status                     ---------                               -----------         ------                     CBC Auto Differential[[id number]]        Abnormal            Final result                 Please view results for these tests on the individual orders.    Comprehensive Metabolic Panel [[id number]]  (Abnormal) Collected: 04/10/24 1039    Specimen: Blood Updated: 04/10/24 1118     Glucose 119 mg/dL      BUN 19 mg/dL      Creatinine 1.05 mg/dL      Sodium 142 mmol/L      Potassium 4.1 mmol/L      Chloride 107 mmol/L      CO2 26.0 mmol/L      Calcium 9.3 mg/dL      Total Protein 6.7 g/dL      Albumin 4.1 g/dL      ALT (SGPT) 11 U/L      AST (SGOT) 13 U/L  "     Alkaline Phosphatase 144 U/L      Total Bilirubin 0.6 mg/dL      Globulin 2.6 gm/dL      A/G Ratio 1.6 g/dL      BUN/Creatinine Ratio 18.1     Anion Gap 9.0 mmol/L      eGFR 51.2 mL/min/1.73     Narrative:      GFR Normal >60  Chronic Kidney Disease <60  Kidney Failure <15    The GFR formula is only valid for adults with stable renal function between ages 18 and 70.    High Sensitivity Troponin T [[id number]]  (Abnormal) Collected: 04/10/24 1039    Specimen: Blood Updated: 04/10/24 1112     HS Troponin T 20 ng/L     Narrative:      High Sensitive Troponin T Reference Range:  <14.0 ng/L- Negative Female for AMI  <22.0 ng/L- Negative Male for AMI  >=14 - Abnormal Female indicating possible myocardial injury.  >=22 - Abnormal Male indicating possible myocardial injury.   Clinicians would have to utilize clinical acumen, EKG, Troponin, and serial changes to determine if it is an Acute Myocardial Infarction or myocardial injury due to an underlying chronic condition.         D-dimer, Quantitative [[id number]]  (Normal) Collected: 04/10/24 1039    Specimen: Blood Updated: 04/10/24 1103     D-Dimer, Quantitative 0.34 MCGFEU/mL     Narrative:      According to the assay 's published package insert, a normal (<0.50 MCGFEU/mL) D-dimer result in conjunction with a non-high clinical probability assessment, excludes deep vein thrombosis (DVT) and pulmonary embolism (PE) with high sensitivity.    D-dimer values increase with age and this can make VTE exclusion of an older population difficult. To address this, the [employer], based on best available evidence and recent guidelines, recommends that clinicians use age-adjusted D-dimer thresholds in patients greater than 50 years of age with: a) a low probability of PE who do not meet all Pulmonary Embolism Rule Out Criteria, or b) in those with intermediate probability of PE.   The formula for an age-adjusted D-dimer cut-off is \"age/100\".  For " example, a 60 year old patient would have an age-adjusted cut-off of 0.60 MCGFEU/mL and an 80 year old 0.80 MCGFEU/mL.    BNP [[id number]]  (Normal) Collected: 04/10/24 1039    Specimen: Blood Updated: 04/10/24 1112     proBNP 550.1 pg/mL     Narrative:      This assay is used as an aid in the diagnosis of individuals suspected of having heart failure. It can be used as an aid in the diagnosis of acute decompensated heart failure (ADHF) in patients presenting with signs and symptoms of ADHF to the emergency department (ED). In addition, NT-proBNP of <300 pg/mL indicates ADHF is not likely.    Age Range Result Interpretation  NT-proBNP Concentration (pg/mL:      <50             Positive            >450                   Gray                 300-450                    Negative             <300    50-75           Positive            >900                  Gray                300-900                  Negative            <300      >75             Positive            >1800                  Gray                300-1800                  Negative            <300    Magnesium [[id number]]  (Normal) Collected: 04/10/24 1039    Specimen: Blood Updated: 04/10/24 1113     Magnesium 2.1 mg/dL     CBC Auto Differential [[id number]]  (Abnormal) Collected: 04/10/24 1039    Specimen: Blood Updated: 04/10/24 1052     WBC 8.11 10*3/mm3      RBC 4.33 10*6/mm3      Hemoglobin 12.3 g/dL      Hematocrit 38.4 %      MCV 88.7 fL      MCH 28.4 pg      MCHC 32.0 g/dL      RDW 12.8 %      RDW-SD 41.3 fl      MPV 9.9 fL      Platelets 151 10*3/mm3      Neutrophil % 72.6 %      Lymphocyte % 17.4 %      Monocyte % 5.7 %      Eosinophil % 3.6 %      Basophil % 0.2 %      Immature Grans % 0.5 %      Neutrophils, Absolute 5.89 10*3/mm3      Lymphocytes, Absolute 1.41 10*3/mm3      Monocytes, Absolute 0.46 10*3/mm3      Eosinophils, Absolute 0.29 10*3/mm3      Basophils, Absolute 0.02 10*3/mm3      Immature Grans, Absolute 0.04 10*3/mm3      nRBC 0.0 /100  WBC     High Sensitivity Troponin T 2Hr [[id number]]  (Abnormal) Collected: 04/10/24 1239    Specimen: Blood Updated: 04/10/24 1313     HS Troponin T 17 ng/L      Troponin T Delta -3 ng/L     Narrative:      High Sensitive Troponin T Reference Range:  <14.0 ng/L- Negative Female for AMI  <22.0 ng/L- Negative Male for AMI  >=14 - Abnormal Female indicating possible myocardial injury.  >=22 - Abnormal Male indicating possible myocardial injury.   Clinicians would have to utilize clinical acumen, EKG, Troponin, and serial changes to determine if it is an Acute Myocardial Infarction or myocardial injury due to an underlying chronic condition.                 CT Angiogram Chest   Final Result       1.  No evidence of pulmonary embolus. The distal pulmonary arteries are   slightly tortuous which is a nonspecific finding. Patchy groundglass in   both lungs is most likely related to shallow inspiration and   atelectasis. An underlying infectious/inflammatory process is felt to be   possible as well but less likely.       2.  In the posterolateral RIGHT upper lobe (series 5, image 51) there is   a 1.5 x 1.9 x 1.3 cm nodular density abutting the pleural surface. I am   suspicious that this may represent a focal infectious/inflammatory   process; however, a true pulmonary nodule/neoplasm is not excluded and   per Fleischner Society guidelines, in 3 months consider follow-up CT,   PET/CT, or biopsy.       3.  Shallow inspiration with dependent atelectasis noted bilaterally.       4.  This report was placed in the new pulmonary findings folder for   appropriate follow-up.               This report was signed and finalized on 4/10/2024 12:57 PM by Dr. Robin Stoll MD.          XR Chest 1 View   Final Result   1. No focal infiltrate or effusion.   2. Stable bronchial wall thickening.   3. Cardiomegaly.               This report was signed and finalized on 4/10/2024 10:53 AM by Dr. Nick Treviño MD.              ED Course           MDM  Number of Diagnoses or Management Options  Nodule of upper lobe of right lung: new and requires workup  Pleurisy: new and requires workup  Diagnosis management comments: I told the patient that her cardiac enzymes are stable so there is no signs of any cardiac injury.  They were little above normal but certainly stable.  Her CT scan did reveal a nodule in her right upper lung that they thought was infectious so we will treat that.  The rest of her pain sounds more pleuritic than anything else.  I did tell her she need to follow-up with her family doctor to get a recheck of her CT scan of her chest to make sure the nodule did clear as expected.  Will put her on a different antibiotic and see if we get rid of this.  She is discharged in stable condition.       Amount and/or Complexity of Data Reviewed  Clinical lab tests: ordered and reviewed  Tests in the radiology section of CPT®: reviewed and ordered  Tests in the medicine section of CPT®: ordered and reviewed    Risk of Complications, Morbidity, and/or Mortality  Presenting problems: moderate  Diagnostic procedures: moderate  Management options: moderate    Patient Progress  Patient progress: stable        Final diagnoses:   Pleurisy   Nodule of upper lobe of right lung          Kleber Ramírez Jr., MD  04/10/24 8895

## 2024-04-11 LAB
QT INTERVAL: 412 MS
QTC INTERVAL: 407 MS

## 2024-04-17 ENCOUNTER — TELEPHONE (OUTPATIENT)
Dept: CT IMAGING | Facility: HOSPITAL | Age: 89
End: 2024-04-17
Payer: COMMERCIAL

## 2024-04-17 ENCOUNTER — TRANSCRIBE ORDERS (OUTPATIENT)
Dept: ADMINISTRATIVE | Facility: HOSPITAL | Age: 89
End: 2024-04-17
Payer: COMMERCIAL

## 2024-04-17 DIAGNOSIS — E04.9 NONTOXIC GOITER, UNSPECIFIED: Primary | ICD-10-CM

## 2024-04-17 NOTE — TELEPHONE ENCOUNTER
I spoke with [facility] about incidental lung findings on CTA Chest through ER. She said they have her down for repeat in July 2024.

## 2024-04-24 ENCOUNTER — HOSPITAL ENCOUNTER (OUTPATIENT)
Dept: ULTRASOUND IMAGING | Facility: HOSPITAL | Age: 89
Discharge: HOME | End: 2024-04-24
Admitting: PHYSICIAN ASSISTANT
Payer: MEDICARE

## 2024-04-24 DIAGNOSIS — E04.9 NONTOXIC GOITER, UNSPECIFIED: ICD-10-CM

## 2024-04-24 PROCEDURE — 76536 US EXAM OF HEAD AND NECK: CPT

## 2024-04-29 DIAGNOSIS — I10 HYPERTENSION, BENIGN: ICD-10-CM

## 2024-04-29 RX ORDER — CARVEDILOL 12.5 MG/1
12.5 TABLET ORAL 2 TIMES DAILY
Qty: 60 TABLET | Refills: 11 | Status: SHIPPED | OUTPATIENT
Start: 2024-04-29

## 2024-05-02 ENCOUNTER — TELEPHONE (OUTPATIENT)
Dept: OTOLARYNGOLOGY | Facility: CLINIC | Age: 89
End: 2024-05-02
Payer: MEDICARE

## 2024-07-20 ENCOUNTER — TRANSCRIBE ORDERS (OUTPATIENT)
Dept: ADMINISTRATIVE | Facility: HOSPITAL | Age: 89
End: 2024-07-20
Payer: MEDICARE

## 2024-07-20 DIAGNOSIS — R91.1 SOLITARY PULMONARY NODULE: Primary | ICD-10-CM

## 2024-08-07 NOTE — PROGRESS NOTES
HCC coding opportunities          Chart Reviewed number of suggestions sent to Provider: 3   E66.01  E11.65  E11.3513    Patients Insurance        Commercial Insurance: [employer]        Subjective   Alka Nicolas is a 86 y.o. female.   Chief Complaint   Patient presents with   • Hospital Follow Up Visit     d/c 09/12/2021 from [facility]  with Acute kidney injury        History of Present Illness this is a follow-up for recent hospitalization for kidney failure and hypokalemia.  I was called about 2:00 in the morning about a week ago for this patient's potassium that was low we reviewed her remaining lab work which showed her to be in acute kidney failure we recommend hospitalization she was hospitalized treated for urinary tract infection as well as acute kidney failure.  I believe certainly that the urinary tract infection may have been going on for some time.  This patient apparently does not complain according to her family who is present with her.  It looks like it got her into quite a bit of trouble.  Her potassium was low this likely was from the kidney failure.    The following portions of the patient's history were reviewed and updated as appropriate: allergies, current medications, past family history, past medical history, past social history, past surgical history and problem list.    Review of Systems   Constitutional: Negative for activity change, appetite change, fatigue, fever, unexpected weight gain and unexpected weight loss.   HENT: Negative for swollen glands, trouble swallowing and voice change.    Eyes: Negative for blurred vision and visual disturbance.   Respiratory: Negative for cough and shortness of breath.    Cardiovascular: Negative for chest pain, palpitations and leg swelling.   Gastrointestinal: Negative for abdominal pain, constipation, diarrhea, nausea, vomiting and indigestion.   Endocrine: Negative for cold intolerance, heat intolerance, polydipsia and polyphagia.   Genitourinary: Negative for dysuria and frequency.   Musculoskeletal: Negative for arthralgias, back pain, joint swelling and neck pain.   Skin: Negative for color change, rash and skin lesions.    Neurological: Negative for dizziness, weakness, headache, memory problem and confusion.   Hematological: Does not bruise/bleed easily.   Psychiatric/Behavioral: Negative for agitation, hallucinations and suicidal ideas. The patient is not nervous/anxious.        Objective   Past Medical History:   Diagnosis Date   • Abdominal aortic aneurysm (CMS/HCC)    • Angina pectoris (CMS/HCC)    • Atherosclerosis of native coronary artery of native heart without angina pectoris    • CAD (coronary artery disease)    • Esophageal reflux    • GERD (gastroesophageal reflux disease)    • History of PTCA    • Hyperlipidemia    • Hypertension    • Hypertension, essential, benign    • Tobacco use disorder       Past Surgical History:   Procedure Laterality Date   • APPENDECTOMY     • CARDIAC CATHETERIZATION     • CARDIAC CATHETERIZATION Left 10/21/2020    Procedure: Cardiac Catheterization/Vascular Study;  Surgeon: Marcos Ratliff MD;  Location: [facility] CATH INVASIVE LOC[facility];  Service: Cardiology;  Laterality: Left;   • CHOLECYSTECTOMY     • HYSTERECTOMY      total        Current Outpatient Medications:   •  albuterol sulfate  (90 Base) MCG/ACT inhaler, Inhale 2 puffs 4 (Four) Times a Day., Disp: 6.7 g, Rfl: 5  •  amLODIPine (NORVASC) 5 MG tablet, Take 10 mg by mouth Daily., Disp: , Rfl:   •  betamethasone dipropionate (DIPROLENE) 0.05 % ointment, Apply 1 application topically to the appropriate area as directed Daily As Needed., Disp: , Rfl:   •  clobetasol (TEMOVATE) 0.05 % cream, Apply  topically to the appropriate area as directed 2 (Two) Times a Day., Disp: , Rfl:   •  clopidogrel (PLAVIX) 75 MG tablet, Take 75 mg by mouth Daily., Disp: , Rfl:   •  fluticasone-salmeterol (Advair Diskus) 250-50 MCG/DOSE DISKUS, Inhale 1 puff 2 (Two) Times a Day., Disp: 1 each, Rfl: 5  •  Hydrocortisone, Perianal, (ANUSOL-HC) 2.5 % rectal cream, Insert  into the rectum 2 (Two) Times a Day., Disp: , Rfl:   •  levothyroxine  (SYNTHROID, LEVOTHROID) 88 MCG tablet, Take 88 mcg by mouth Daily., Disp: , Rfl:   •  metoprolol succinate XL (TOPROL-XL) 25 MG 24 hr tablet, Take 1 tablet by mouth Daily., Disp: 90 tablet, Rfl: 3  •  Multiple Vitamins-Minerals (PRESERVISION AREDS 2+MULTI VIT PO), Take 2 capsules by mouth Daily., Disp: , Rfl:   •  potassium chloride (K-DUR) 10 MEQ CR tablet, Take 1 tablet by mouth Daily., Disp: 30 tablet, Rfl: 11  •  simvastatin (ZOCOR) 20 MG tablet, Take 40 mg by mouth Every Night., Disp: , Rfl:       Vitals:    09/20/21 0951   BP: 108/48   Pulse: 72   Temp: 97.6 °F (36.4 °C)   SpO2: 98%         09/20/21  0951   Weight: 64 kg (141 lb)       Body mass index is 27.54 kg/m².    Physical Exam  Constitutional:       Appearance: She is well-developed.   HENT:      Head: Normocephalic and atraumatic.   Eyes:      Pupils: Pupils are equal, round, and reactive to light.   Cardiovascular:      Rate and Rhythm: Normal rate and regular rhythm.   Pulmonary:      Effort: Pulmonary effort is normal.      Breath sounds: Normal breath sounds.   Abdominal:      General: Bowel sounds are normal.      Palpations: Abdomen is soft.   Musculoskeletal:         General: Normal range of motion.      Cervical back: Normal range of motion and neck supple.   Skin:     General: Skin is warm and dry.   Neurological:      Mental Status: She is alert and oriented to person, place, and time.   Psychiatric:         Behavior: Behavior normal.               Assessment/Plan   Diagnoses and all orders for this visit:    1. Acute renal failure with tubular necrosis (CMS/HCC) (Primary)  -     US Renal Bilateral; Future  -     Basic Metabolic Panel; Future    2. Hypertension, benign    3. Hypothyroidism (acquired)    4. Vitamin B12 deficiency    Other orders  -     Cancel: POC Urinalysis Dipstick, Multipro      At today's visit patient is seen for acute kidney failure associated with hypokalemia and caused by a recent urinary tract infection.  At today's  visit I looked at a urine which was okay I have encouraged patient to reach out to us if she has symptoms so that she does not get into trouble like this again.  Hopefully will continue to see her renal function improve I will see her back in 2 weeks.

## 2024-08-08 ENCOUNTER — HOSPITAL ENCOUNTER (OUTPATIENT)
Dept: CT IMAGING | Facility: HOSPITAL | Age: 89
Discharge: HOME | End: 2024-08-08
Admitting: PHYSICIAN ASSISTANT
Payer: MEDICARE

## 2024-08-08 DIAGNOSIS — R91.1 SOLITARY PULMONARY NODULE: ICD-10-CM

## 2024-08-08 PROCEDURE — 71250 CT THORAX DX C-: CPT

## 2024-08-15 ENCOUNTER — TRANSCRIBE ORDERS (OUTPATIENT)
Dept: ADMINISTRATIVE | Facility: HOSPITAL | Age: 89
End: 2024-08-15
Payer: MEDICARE

## 2024-08-15 DIAGNOSIS — D38.1 NEOPLASM OF UNCERTAIN BEHAVIOR OF TRACHEA, BRONCHUS, AND LUNG: Primary | ICD-10-CM

## 2024-09-10 ENCOUNTER — OFFICE VISIT (OUTPATIENT)
Dept: PULMONOLOGY | Facility: CLINIC | Age: 89
End: 2024-09-10
Payer: MEDICARE

## 2024-09-10 ENCOUNTER — PREP FOR SURGERY (OUTPATIENT)
Dept: OTHER | Facility: HOSPITAL | Age: 89
End: 2024-09-10
Payer: MEDICARE

## 2024-09-10 VITALS
OXYGEN SATURATION: 95 % | WEIGHT: 123 LBS | BODY MASS INDEX: 24.15 KG/M2 | HEIGHT: 60 IN | DIASTOLIC BLOOD PRESSURE: 70 MMHG | HEART RATE: 64 BPM | SYSTOLIC BLOOD PRESSURE: 130 MMHG

## 2024-09-10 DIAGNOSIS — R91.8 MULTIPLE LUNG NODULES: Primary | ICD-10-CM

## 2024-09-10 DIAGNOSIS — I25.10 CORONARY ARTERY DISEASE INVOLVING NATIVE CORONARY ARTERY OF NATIVE HEART WITHOUT ANGINA PECTORIS: ICD-10-CM

## 2024-09-10 PROCEDURE — 99205 OFFICE O/P NEW HI 60 MIN: CPT | Performed by: INTERNAL MEDICINE

## 2024-09-10 RX ORDER — SODIUM CHLORIDE 0.9 % (FLUSH) 0.9 %
10 SYRINGE (ML) INJECTION AS NEEDED
OUTPATIENT
Start: 2024-09-10

## 2024-09-10 RX ORDER — SODIUM CHLORIDE 9 MG/ML
40 INJECTION, SOLUTION INTRAVENOUS AS NEEDED
OUTPATIENT
Start: 2024-09-10

## 2024-09-10 RX ORDER — SODIUM CHLORIDE 0.9 % (FLUSH) 0.9 %
10 SYRINGE (ML) INJECTION EVERY 12 HOURS SCHEDULED
OUTPATIENT
Start: 2024-09-10

## 2024-09-10 NOTE — H&P (VIEW-ONLY)
"Background:  Pt w multiple small lung nodules up to 2.7 cm   Chief Complaint  pulmonary nodule    Subjective    History of Present Illness     Alka Nicolas is here for follow up with [facility].  History of Present Illness  There is no history of lung disease.  She had a heart stent 20 years ago and afib after.  She saw Dr. Ratliff before he retired.  She got pleurisy earlier in the spring on the left side. She has an albuterol inhaler which she uses 1 time per week or less.  She is very active.       Tobacco Use: High Risk (9/10/2024)    Patient History     Smoking Tobacco Use: Some Days     Smokeless Tobacco Use: Never     Passive Exposure: Not on file      Current Outpatient Medications   Medication Instructions    albuterol sulfate  (90 Base) MCG/ACT inhaler 2 puffs, Inhalation, 4 Times Daily - RT    amLODIPine (NORVASC) 5 MG tablet TAKE ONE TABLET BY MOUH DAILY    amoxicillin (AMOXIL) 250 MG capsule 1 capsule, Oral, Daily    apixaban (ELIQUIS) 2.5 mg, Oral, Every 12 Hours Scheduled    atorvastatin (LIPITOR) 40 MG tablet TAKE ONE TABLET BY MOUTH EVERY NIGHT.    carvedilol (COREG) 12.5 mg, Oral, 2 Times Daily    clobetasol (TEMOVATE) 0.05 % cream 2 Times Daily    cloNIDine (CATAPRES) 0.1 mg, Oral, Daily PRN, For blood pressure > 180/90    guaifenesin (ROBITUSSIN) 200 mg, Oral, Every 4 Hours PRN    Hydrocortisone, Perianal, (ANUSOL-HC) 2.5 % rectal cream Rectal, As Needed    levothyroxine (SYNTHROID, LEVOTHROID) 100 mcg, Oral, Daily    Multiple Vitamins-Minerals (PRESERVISION AREDS 2+MULTI VIT PO) 1 capsule, Oral, Daily    potassium chloride (K-DUR,KLOR-CON) 10 MEQ CR tablet 10 mEq, Oral, Daily    predniSONE (DELTASONE) 10 MG (21) dose pack Use as directed on package      Objective     Vital Signs:   /70   Pulse 64   Ht 152.4 cm (60\")   Wt 55.8 kg (123 lb)   SpO2 95% Comment: RA  BMI 24.02 kg/m²   Physical Exam  Constitutional:       " General: She is not in acute distress.     Appearance: She is well-developed. She is not ill-appearing or toxic-appearing.   HENT:      Head: Atraumatic.   Eyes:      General: No scleral icterus.     Conjunctiva/sclera: Conjunctivae normal.   Cardiovascular:      Rate and Rhythm: Normal rate and regular rhythm.      Heart sounds: S1 normal and S2 normal.   Pulmonary:      Effort: Pulmonary effort is normal.      Breath sounds: Normal breath sounds.   Abdominal:      General: There is no distension.   Musculoskeletal:         General: No deformity.      Cervical back: Neck supple.   Skin:     Coloration: Skin is not pale.      Findings: No rash.   Neurological:      Mental Status: She is alert.        Result Review  Data Reviewed:  Echo 2021  Left ventricular wall thickness is consistent with mild eccentric hypertrophy.  Left ventricular ejection fraction appears to be 56 - 60%. Left ventricular systolic function is normal.  Left ventricular diastolic function was indeterminate.  Left atrial volume is mildly increased.  There is mild, anterior mitral leaflet thickening present.  Mild mitral valve stenosis is present.  There is a circumferential pericardial effusion.     Cardiac Catheterization/Vascular Study (10/21/2020 15:06)   No significant coronary artery occlusioin  Normal LV function                 Assessment and Plan    Diagnoses and all orders for this visit:    1. Multiple lung nodules (Primary)  -     Ambulatory Referral to Cardiology    2. Coronary artery disease involving native coronary artery of native heart without angina pectoris  -     Ambulatory Referral to Cardiology    We discussed the findings on ct  There are 2 nodules of concern,   these are both approachable with navigational bronchoscopy  We discussed risks of anesthesia and the procedure itself, which are considered strongly in pt her age.  We discussed particularly risks of cardiac event, pneumothorax, and bleeding although risks are not  limited to these events  She indicates she is worried about this process and wants answers, and is willing to accept the risks.  We discussed hx cad, remote hx cardiac stent  Will ask cardiology to provide risk assessment  Tentatively plan bronchoscopy with ion robot and ebus.  We discussed dx of primary malignancy could be treated with sbrt.    Follow Up   Return in about 6 weeks (around 10/22/2024).  Patient was given instructions and counseling regarding her condition or for health maintenance advice. Please see specific information pulled into the AVS if appropriate.    Electronically signed by Lucas Johnson MD, 9/10/2024, 22:18 CDT

## 2024-09-10 NOTE — PROGRESS NOTES
"Background:  Pt w multiple small lung nodules up to 2.7 cm   Chief Complaint  pulmonary nodule    Subjective    History of Present Illness     Alka Nicolas is here for follow up with [facility].  History of Present Illness  There is no history of lung disease.  She had a heart stent 20 years ago and afib after.  She saw Dr. Ratliff before he retired.  She got pleurisy earlier in the spring on the left side. She has an albuterol inhaler which she uses 1 time per week or less.  She is very active.       Tobacco Use: High Risk (9/10/2024)    Patient History     Smoking Tobacco Use: Some Days     Smokeless Tobacco Use: Never     Passive Exposure: Not on file      Current Outpatient Medications   Medication Instructions    albuterol sulfate  (90 Base) MCG/ACT inhaler 2 puffs, Inhalation, 4 Times Daily - RT    amLODIPine (NORVASC) 5 MG tablet TAKE ONE TABLET BY MOUH DAILY    amoxicillin (AMOXIL) 250 MG capsule 1 capsule, Oral, Daily    apixaban (ELIQUIS) 2.5 mg, Oral, Every 12 Hours Scheduled    atorvastatin (LIPITOR) 40 MG tablet TAKE ONE TABLET BY MOUTH EVERY NIGHT.    carvedilol (COREG) 12.5 mg, Oral, 2 Times Daily    clobetasol (TEMOVATE) 0.05 % cream 2 Times Daily    cloNIDine (CATAPRES) 0.1 mg, Oral, Daily PRN, For blood pressure > 180/90    guaifenesin (ROBITUSSIN) 200 mg, Oral, Every 4 Hours PRN    Hydrocortisone, Perianal, (ANUSOL-HC) 2.5 % rectal cream Rectal, As Needed    levothyroxine (SYNTHROID, LEVOTHROID) 100 mcg, Oral, Daily    Multiple Vitamins-Minerals (PRESERVISION AREDS 2+MULTI VIT PO) 1 capsule, Oral, Daily    potassium chloride (K-DUR,KLOR-CON) 10 MEQ CR tablet 10 mEq, Oral, Daily    predniSONE (DELTASONE) 10 MG (21) dose pack Use as directed on package      Objective     Vital Signs:   /70   Pulse 64   Ht 152.4 cm (60\")   Wt 55.8 kg (123 lb)   SpO2 95% Comment: RA  BMI 24.02 kg/m²   Physical Exam  Constitutional:       " General: She is not in acute distress.     Appearance: She is well-developed. She is not ill-appearing or toxic-appearing.   HENT:      Head: Atraumatic.   Eyes:      General: No scleral icterus.     Conjunctiva/sclera: Conjunctivae normal.   Cardiovascular:      Rate and Rhythm: Normal rate and regular rhythm.      Heart sounds: S1 normal and S2 normal.   Pulmonary:      Effort: Pulmonary effort is normal.      Breath sounds: Normal breath sounds.   Abdominal:      General: There is no distension.   Musculoskeletal:         General: No deformity.      Cervical back: Neck supple.   Skin:     Coloration: Skin is not pale.      Findings: No rash.   Neurological:      Mental Status: She is alert.        Result Review  Data Reviewed:  Echo 2021  Left ventricular wall thickness is consistent with mild eccentric hypertrophy.  Left ventricular ejection fraction appears to be 56 - 60%. Left ventricular systolic function is normal.  Left ventricular diastolic function was indeterminate.  Left atrial volume is mildly increased.  There is mild, anterior mitral leaflet thickening present.  Mild mitral valve stenosis is present.  There is a circumferential pericardial effusion.     Cardiac Catheterization/Vascular Study (10/21/2020 15:06)   No significant coronary artery occlusioin  Normal LV function                 Assessment and Plan    Diagnoses and all orders for this visit:    1. Multiple lung nodules (Primary)  -     Ambulatory Referral to Cardiology    2. Coronary artery disease involving native coronary artery of native heart without angina pectoris  -     Ambulatory Referral to Cardiology    We discussed the findings on ct  There are 2 nodules of concern,   these are both approachable with navigational bronchoscopy  We discussed risks of anesthesia and the procedure itself, which are considered strongly in pt her age.  We discussed particularly risks of cardiac event, pneumothorax, and bleeding although risks are not  limited to these events  She indicates she is worried about this process and wants answers, and is willing to accept the risks.  We discussed hx cad, remote hx cardiac stent  Will ask cardiology to provide risk assessment  Tentatively plan bronchoscopy with ion robot and ebus.  We discussed dx of primary malignancy could be treated with sbrt.    Follow Up   Return in about 6 weeks (around 10/22/2024).  Patient was given instructions and counseling regarding her condition or for health maintenance advice. Please see specific information pulled into the AVS if appropriate.    Electronically signed by Lucas Johnson MD, 9/10/2024, 22:18 CDT

## 2024-09-11 NOTE — H&P
"Background:  Pt w multiple small lung nodules up to 2.7 cm   Chief Complaint  pulmonary nodule    Subjective    History of Present Illness     Alka Nicolas is here for follow up with [facility].  History of Present Illness  There is no history of lung disease.  She had a heart stent 20 years ago and afib after.  She saw Dr. Ratliff before he retired.  She got pleurisy earlier in the spring on the left side. She has an albuterol inhaler which she uses 1 time per week or less.  She is very active.       Tobacco Use: High Risk (9/10/2024)    Patient History     Smoking Tobacco Use: Some Days     Smokeless Tobacco Use: Never     Passive Exposure: Not on file      Current Outpatient Medications   Medication Instructions    albuterol sulfate  (90 Base) MCG/ACT inhaler 2 puffs, Inhalation, 4 Times Daily - RT    amLODIPine (NORVASC) 5 MG tablet TAKE ONE TABLET BY MOUH DAILY    amoxicillin (AMOXIL) 250 MG capsule 1 capsule, Oral, Daily    apixaban (ELIQUIS) 2.5 mg, Oral, Every 12 Hours Scheduled    atorvastatin (LIPITOR) 40 MG tablet TAKE ONE TABLET BY MOUTH EVERY NIGHT.    carvedilol (COREG) 12.5 mg, Oral, 2 Times Daily    clobetasol (TEMOVATE) 0.05 % cream 2 Times Daily    cloNIDine (CATAPRES) 0.1 mg, Oral, Daily PRN, For blood pressure > 180/90    guaifenesin (ROBITUSSIN) 200 mg, Oral, Every 4 Hours PRN    Hydrocortisone, Perianal, (ANUSOL-HC) 2.5 % rectal cream Rectal, As Needed    levothyroxine (SYNTHROID, LEVOTHROID) 100 mcg, Oral, Daily    Multiple Vitamins-Minerals (PRESERVISION AREDS 2+MULTI VIT PO) 1 capsule, Oral, Daily    potassium chloride (K-DUR,KLOR-CON) 10 MEQ CR tablet 10 mEq, Oral, Daily    predniSONE (DELTASONE) 10 MG (21) dose pack Use as directed on package      Objective     Vital Signs:   /70   Pulse 64   Ht 152.4 cm (60\")   Wt 55.8 kg (123 lb)   SpO2 95% Comment: RA  BMI 24.02 kg/m²   Physical Exam  Constitutional:       " General: She is not in acute distress.     Appearance: She is well-developed. She is not ill-appearing or toxic-appearing.   HENT:      Head: Atraumatic.   Eyes:      General: No scleral icterus.     Conjunctiva/sclera: Conjunctivae normal.   Cardiovascular:      Rate and Rhythm: Normal rate and regular rhythm.      Heart sounds: S1 normal and S2 normal.   Pulmonary:      Effort: Pulmonary effort is normal.      Breath sounds: Normal breath sounds.   Abdominal:      General: There is no distension.   Musculoskeletal:         General: No deformity.      Cervical back: Neck supple.   Skin:     Coloration: Skin is not pale.      Findings: No rash.   Neurological:      Mental Status: She is alert.        Result Review  Data Reviewed:  Echo 2021  Left ventricular wall thickness is consistent with mild eccentric hypertrophy.  Left ventricular ejection fraction appears to be 56 - 60%. Left ventricular systolic function is normal.  Left ventricular diastolic function was indeterminate.  Left atrial volume is mildly increased.  There is mild, anterior mitral leaflet thickening present.  Mild mitral valve stenosis is present.  There is a circumferential pericardial effusion.     Cardiac Catheterization/Vascular Study (10/21/2020 15:06)   No significant coronary artery occlusioin  Normal LV function                 Assessment and Plan    Diagnoses and all orders for this visit:    1. Multiple lung nodules (Primary)  -     Ambulatory Referral to Cardiology    2. Coronary artery disease involving native coronary artery of native heart without angina pectoris  -     Ambulatory Referral to Cardiology    We discussed the findings on ct  There are 2 nodules of concern,   these are both approachable with navigational bronchoscopy  We discussed risks of anesthesia and the procedure itself, which are considered strongly in pt her age.  We discussed particularly risks of cardiac event, pneumothorax, and bleeding although risks are not  limited to these events  She indicates she is worried about this process and wants answers, and is willing to accept the risks.  We discussed hx cad, remote hx cardiac stent  Will ask cardiology to provide risk assessment  Tentatively plan bronchoscopy with ion robot and ebus.  We discussed dx of primary malignancy could be treated with sbrt.    Follow Up   Return in about 6 weeks (around 10/22/2024).  Patient was given instructions and counseling regarding her condition or for health maintenance advice. Please see specific information pulled into the AVS if appropriate.    Electronically signed by Lucas Johnson MD, 9/10/2024, 22:18 CDT

## 2024-09-12 ENCOUNTER — TELEPHONE (OUTPATIENT)
Dept: CARDIOLOGY | Facility: CLINIC | Age: 89
End: 2024-09-12
Payer: MEDICARE

## 2024-09-12 NOTE — TELEPHONE ENCOUNTER
I have talked with Hui.  Appointment has been made for Sept 24th in [place].  I have requested cardiac clearance request from Dr Lucas Burton office.

## 2024-09-12 NOTE — TELEPHONE ENCOUNTER
I left a VM on Hui's phone stating her mom will need an appointment for a cardiac risk assessment for her lung biopsy.

## 2024-09-12 NOTE — TELEPHONE ENCOUNTER
Hui called stating her mom is needing a lung biopsy scheduled and was wanting to know if you would need to see her to do a cardiac clearance. Her LOV was with Dr Ratliff in November.

## 2024-09-16 ENCOUNTER — TELEPHONE (OUTPATIENT)
Dept: PULMONOLOGY | Facility: CLINIC | Age: 89
End: 2024-09-16
Payer: MEDICARE

## 2024-09-16 PROBLEM — R91.8 MULTIPLE LUNG NODULES: Status: ACTIVE | Noted: 2024-09-10

## 2024-09-18 ENCOUNTER — PATIENT ROUNDING (BHMG ONLY) (OUTPATIENT)
Dept: PULMONOLOGY | Facility: CLINIC | Age: 89
End: 2024-09-18
Payer: MEDICARE

## 2024-09-24 ENCOUNTER — OFFICE VISIT (OUTPATIENT)
Dept: CARDIOLOGY | Facility: CLINIC | Age: 89
End: 2024-09-24
Payer: MEDICARE

## 2024-09-24 ENCOUNTER — DOCUMENTATION (OUTPATIENT)
Dept: CARDIOLOGY | Facility: CLINIC | Age: 89
End: 2024-09-24
Payer: MEDICARE

## 2024-09-24 VITALS
RESPIRATION RATE: 18 BRPM | SYSTOLIC BLOOD PRESSURE: 142 MMHG | WEIGHT: 124 LBS | HEIGHT: 60 IN | BODY MASS INDEX: 24.35 KG/M2 | HEART RATE: 51 BPM | DIASTOLIC BLOOD PRESSURE: 82 MMHG | OXYGEN SATURATION: 98 %

## 2024-09-24 DIAGNOSIS — I34.2 NONRHEUMATIC MITRAL VALVE STENOSIS: Primary | ICD-10-CM

## 2024-09-24 DIAGNOSIS — I34.2 NONRHEUMATIC MITRAL VALVE STENOSIS: ICD-10-CM

## 2024-09-24 DIAGNOSIS — I25.10 CORONARY ARTERY DISEASE INVOLVING NATIVE CORONARY ARTERY OF NATIVE HEART WITHOUT ANGINA PECTORIS: Primary | ICD-10-CM

## 2024-09-24 DIAGNOSIS — R91.8 MULTIPLE LUNG NODULES: ICD-10-CM

## 2024-09-24 DIAGNOSIS — E78.2 MIXED HYPERLIPIDEMIA: ICD-10-CM

## 2024-09-24 DIAGNOSIS — I10 HYPERTENSION, BENIGN: ICD-10-CM

## 2024-09-24 DIAGNOSIS — I48.0 PAROXYSMAL ATRIAL FIBRILLATION: ICD-10-CM

## 2024-09-24 PROCEDURE — 99214 OFFICE O/P EST MOD 30 MIN: CPT | Performed by: NURSE PRACTITIONER

## 2024-09-24 PROCEDURE — 93000 ELECTROCARDIOGRAM COMPLETE: CPT | Performed by: NURSE PRACTITIONER

## 2024-09-25 ENCOUNTER — PRE-ADMISSION TESTING (OUTPATIENT)
Dept: PREADMISSION TESTING | Facility: HOSPITAL | Age: 89
End: 2024-09-25
Payer: MEDICARE

## 2024-09-25 ENCOUNTER — HOSPITAL ENCOUNTER (OUTPATIENT)
Dept: CT IMAGING | Facility: HOSPITAL | Age: 89
Discharge: HOME | End: 2024-09-25
Payer: MEDICARE

## 2024-09-25 VITALS
HEART RATE: 66 BPM | SYSTOLIC BLOOD PRESSURE: 148 MMHG | OXYGEN SATURATION: 97 % | RESPIRATION RATE: 20 BRPM | HEIGHT: 59 IN | DIASTOLIC BLOOD PRESSURE: 58 MMHG | BODY MASS INDEX: 25.11 KG/M2 | WEIGHT: 124.56 LBS

## 2024-09-25 DIAGNOSIS — R91.8 MULTIPLE LUNG NODULES: ICD-10-CM

## 2024-09-25 LAB
ANION GAP SERPL CALCULATED.3IONS-SCNC: 10 MMOL/L (ref 5–15)
BUN SERPL-MCNC: 22 MG/DL (ref 8–23)
BUN/CREAT SERPL: 22.2 (ref 7–25)
CALCIUM SPEC-SCNC: 8.4 MG/DL (ref 8.6–10.5)
CHLORIDE SERPL-SCNC: 105 MMOL/L (ref 98–107)
CO2 SERPL-SCNC: 24 MMOL/L (ref 22–29)
CREAT SERPL-MCNC: 0.99 MG/DL (ref 0.57–1)
DEPRECATED RDW RBC AUTO: 44 FL (ref 37–54)
EGFRCR SERPLBLD CKD-EPI 2021: 54.6 ML/MIN/1.73
ERYTHROCYTE [DISTWIDTH] IN BLOOD BY AUTOMATED COUNT: 13.4 % (ref 12.3–15.4)
GLUCOSE SERPL-MCNC: 119 MG/DL (ref 65–99)
HCT VFR BLD AUTO: 40.5 % (ref 34–46.6)
HGB BLD-MCNC: 13.1 G/DL (ref 12–15.9)
MCH RBC QN AUTO: 28.9 PG (ref 26.6–33)
MCHC RBC AUTO-ENTMCNC: 32.3 G/DL (ref 31.5–35.7)
MCV RBC AUTO: 89.4 FL (ref 79–97)
PLATELET # BLD AUTO: 196 10*3/MM3 (ref 140–450)
PMV BLD AUTO: 9.9 FL (ref 6–12)
POTASSIUM SERPL-SCNC: 3.8 MMOL/L (ref 3.5–5.2)
RBC # BLD AUTO: 4.53 10*6/MM3 (ref 3.77–5.28)
SODIUM SERPL-SCNC: 139 MMOL/L (ref 136–145)
WBC NRBC COR # BLD AUTO: 8.06 10*3/MM3 (ref 3.4–10.8)

## 2024-09-25 PROCEDURE — 80048 BASIC METABOLIC PNL TOTAL CA: CPT

## 2024-09-25 PROCEDURE — 36415 COLL VENOUS BLD VENIPUNCTURE: CPT

## 2024-09-25 PROCEDURE — 85027 COMPLETE CBC AUTOMATED: CPT

## 2024-09-25 PROCEDURE — 71250 CT THORAX DX C-: CPT

## 2024-09-25 RX ORDER — METHYLPREDNISOLONE 4 MG
TABLET, DOSE PACK ORAL 2 TIMES DAILY
COMMUNITY
End: 2024-09-30 | Stop reason: HOSPADM

## 2024-09-26 ENCOUNTER — TELEPHONE (OUTPATIENT)
Dept: PULMONOLOGY | Facility: CLINIC | Age: 89
End: 2024-09-26
Payer: MEDICARE

## 2024-09-30 ENCOUNTER — APPOINTMENT (OUTPATIENT)
Dept: GENERAL RADIOLOGY | Facility: HOSPITAL | Age: 89
End: 2024-09-30
Payer: MEDICARE

## 2024-09-30 ENCOUNTER — ANESTHESIA EVENT (OUTPATIENT)
Dept: PERIOP | Facility: HOSPITAL | Age: 89
End: 2024-09-30
Payer: MEDICARE

## 2024-09-30 ENCOUNTER — HOSPITAL ENCOUNTER (OUTPATIENT)
Facility: HOSPITAL | Age: 89
Setting detail: HOSPITAL OUTPATIENT SURGERY
Discharge: HOME | End: 2024-09-30
Attending: INTERNAL MEDICINE | Admitting: INTERNAL MEDICINE
Payer: MEDICARE

## 2024-09-30 ENCOUNTER — ANESTHESIA (OUTPATIENT)
Dept: PERIOP | Facility: HOSPITAL | Age: 89
End: 2024-09-30
Payer: MEDICARE

## 2024-09-30 VITALS
TEMPERATURE: 97.7 F | SYSTOLIC BLOOD PRESSURE: 142 MMHG | HEART RATE: 57 BPM | OXYGEN SATURATION: 97 % | RESPIRATION RATE: 16 BRPM | DIASTOLIC BLOOD PRESSURE: 59 MMHG

## 2024-09-30 DIAGNOSIS — R91.8 MULTIPLE LUNG NODULES: ICD-10-CM

## 2024-09-30 PROBLEM — R59.0 MEDIASTINAL ADENOPATHY: Status: ACTIVE | Noted: 2024-09-30

## 2024-09-30 LAB — KOH PREP NAIL: NORMAL

## 2024-09-30 PROCEDURE — 88341 IMHCHEM/IMCYTCHM EA ADD ANTB: CPT | Performed by: INTERNAL MEDICINE

## 2024-09-30 PROCEDURE — 88172 CYTP DX EVAL FNA 1ST EA SITE: CPT | Performed by: INTERNAL MEDICINE

## 2024-09-30 PROCEDURE — 25010000002 SUGAMMADEX 200 MG/2ML SOLUTION: Performed by: NURSE ANESTHETIST, CERTIFIED REGISTERED

## 2024-09-30 PROCEDURE — 71045 X-RAY EXAM CHEST 1 VIEW: CPT

## 2024-09-30 PROCEDURE — 76000 FLUOROSCOPY <1 HR PHYS/QHP: CPT

## 2024-09-30 PROCEDURE — 87205 SMEAR GRAM STAIN: CPT | Performed by: INTERNAL MEDICINE

## 2024-09-30 PROCEDURE — 25010000002 DEXAMETHASONE PER 1 MG: Performed by: NURSE ANESTHETIST, CERTIFIED REGISTERED

## 2024-09-30 PROCEDURE — 87102 FUNGUS ISOLATION CULTURE: CPT | Performed by: INTERNAL MEDICINE

## 2024-09-30 PROCEDURE — 25010000002 PROPOFOL 10 MG/ML EMULSION: Performed by: NURSE ANESTHETIST, CERTIFIED REGISTERED

## 2024-09-30 PROCEDURE — 25810000003 LACTATED RINGERS PER 1000 ML: Performed by: INTERNAL MEDICINE

## 2024-09-30 PROCEDURE — 87070 CULTURE OTHR SPECIMN AEROBIC: CPT | Performed by: INTERNAL MEDICINE

## 2024-09-30 PROCEDURE — 87220 TISSUE EXAM FOR FUNGI: CPT | Performed by: INTERNAL MEDICINE

## 2024-09-30 PROCEDURE — 88305 TISSUE EXAM BY PATHOLOGIST: CPT | Performed by: INTERNAL MEDICINE

## 2024-09-30 PROCEDURE — 88342 IMHCHEM/IMCYTCHM 1ST ANTB: CPT | Performed by: INTERNAL MEDICINE

## 2024-09-30 PROCEDURE — 31652 BRONCH EBUS SAMPLNG 1/2 NODE: CPT | Performed by: INTERNAL MEDICINE

## 2024-09-30 PROCEDURE — 88112 CYTOPATH CELL ENHANCE TECH: CPT | Performed by: INTERNAL MEDICINE

## 2024-09-30 PROCEDURE — 87206 SMEAR FLUORESCENT/ACID STAI: CPT | Performed by: INTERNAL MEDICINE

## 2024-09-30 PROCEDURE — 25010000002 ONDANSETRON PER 1 MG: Performed by: NURSE ANESTHETIST, CERTIFIED REGISTERED

## 2024-09-30 PROCEDURE — 31654 BRONCH EBUS IVNTJ PERPH LES: CPT | Performed by: INTERNAL MEDICINE

## 2024-09-30 PROCEDURE — C1726 CATH, BAL DIL, NON-VASCULAR: HCPCS | Performed by: INTERNAL MEDICINE

## 2024-09-30 PROCEDURE — 87116 MYCOBACTERIA CULTURE: CPT | Performed by: INTERNAL MEDICINE

## 2024-09-30 PROCEDURE — 31627 NAVIGATIONAL BRONCHOSCOPY: CPT | Performed by: INTERNAL MEDICINE

## 2024-09-30 RX ORDER — ONDANSETRON 2 MG/ML
4 INJECTION INTRAMUSCULAR; INTRAVENOUS ONCE AS NEEDED
Status: DISCONTINUED | OUTPATIENT
Start: 2024-09-30 | End: 2024-09-30 | Stop reason: HOSPADM

## 2024-09-30 RX ORDER — LIDOCAINE HYDROCHLORIDE 20 MG/ML
INJECTION, SOLUTION EPIDURAL; INFILTRATION; INTRACAUDAL; PERINEURAL AS NEEDED
Status: DISCONTINUED | OUTPATIENT
Start: 2024-09-30 | End: 2024-09-30 | Stop reason: SURG

## 2024-09-30 RX ORDER — SODIUM CHLORIDE 9 MG/ML
40 INJECTION, SOLUTION INTRAVENOUS AS NEEDED
Status: DISCONTINUED | OUTPATIENT
Start: 2024-09-30 | End: 2024-09-30 | Stop reason: HOSPADM

## 2024-09-30 RX ORDER — FENTANYL CITRATE 50 UG/ML
25 INJECTION, SOLUTION INTRAMUSCULAR; INTRAVENOUS
Status: DISCONTINUED | OUTPATIENT
Start: 2024-09-30 | End: 2024-09-30 | Stop reason: HOSPADM

## 2024-09-30 RX ORDER — SODIUM CHLORIDE 0.9 % (FLUSH) 0.9 %
10 SYRINGE (ML) INJECTION AS NEEDED
Status: DISCONTINUED | OUTPATIENT
Start: 2024-09-30 | End: 2024-09-30 | Stop reason: HOSPADM

## 2024-09-30 RX ORDER — DEXAMETHASONE SODIUM PHOSPHATE 4 MG/ML
INJECTION, SOLUTION INTRA-ARTICULAR; INTRALESIONAL; INTRAMUSCULAR; INTRAVENOUS; SOFT TISSUE AS NEEDED
Status: DISCONTINUED | OUTPATIENT
Start: 2024-09-30 | End: 2024-09-30 | Stop reason: SURG

## 2024-09-30 RX ORDER — HYDROCODONE BITARTRATE AND ACETAMINOPHEN 5; 325 MG/1; MG/1
1 TABLET ORAL EVERY 4 HOURS PRN
Status: DISCONTINUED | OUTPATIENT
Start: 2024-09-30 | End: 2024-09-30 | Stop reason: HOSPADM

## 2024-09-30 RX ORDER — DEXTROSE MONOHYDRATE 25 G/50ML
12.5 INJECTION, SOLUTION INTRAVENOUS AS NEEDED
Status: DISCONTINUED | OUTPATIENT
Start: 2024-09-30 | End: 2024-09-30 | Stop reason: HOSPADM

## 2024-09-30 RX ORDER — FENTANYL CITRATE 50 UG/ML
50 INJECTION, SOLUTION INTRAMUSCULAR; INTRAVENOUS
Status: DISCONTINUED | OUTPATIENT
Start: 2024-09-30 | End: 2024-09-30 | Stop reason: HOSPADM

## 2024-09-30 RX ORDER — PROPOFOL 10 MG/ML
VIAL (ML) INTRAVENOUS AS NEEDED
Status: DISCONTINUED | OUTPATIENT
Start: 2024-09-30 | End: 2024-09-30 | Stop reason: SURG

## 2024-09-30 RX ORDER — ONDANSETRON 2 MG/ML
INJECTION INTRAMUSCULAR; INTRAVENOUS AS NEEDED
Status: DISCONTINUED | OUTPATIENT
Start: 2024-09-30 | End: 2024-09-30 | Stop reason: SURG

## 2024-09-30 RX ORDER — SODIUM CHLORIDE, SODIUM LACTATE, POTASSIUM CHLORIDE, CALCIUM CHLORIDE 600; 310; 30; 20 MG/100ML; MG/100ML; MG/100ML; MG/100ML
9 INJECTION, SOLUTION INTRAVENOUS CONTINUOUS
Status: DISCONTINUED | OUTPATIENT
Start: 2024-09-30 | End: 2024-09-30 | Stop reason: HOSPADM

## 2024-09-30 RX ORDER — SODIUM CHLORIDE 0.9 % (FLUSH) 0.9 %
10 SYRINGE (ML) INJECTION EVERY 12 HOURS SCHEDULED
Status: DISCONTINUED | OUTPATIENT
Start: 2024-09-30 | End: 2024-09-30 | Stop reason: HOSPADM

## 2024-09-30 RX ORDER — SODIUM CHLORIDE, SODIUM LACTATE, POTASSIUM CHLORIDE, CALCIUM CHLORIDE 600; 310; 30; 20 MG/100ML; MG/100ML; MG/100ML; MG/100ML
1000 INJECTION, SOLUTION INTRAVENOUS CONTINUOUS
Status: DISCONTINUED | OUTPATIENT
Start: 2024-09-30 | End: 2024-09-30 | Stop reason: HOSPADM

## 2024-09-30 RX ORDER — LABETALOL HYDROCHLORIDE 5 MG/ML
5 INJECTION, SOLUTION INTRAVENOUS
Status: DISCONTINUED | OUTPATIENT
Start: 2024-09-30 | End: 2024-09-30 | Stop reason: HOSPADM

## 2024-09-30 RX ORDER — HYDROCODONE BITARTRATE AND ACETAMINOPHEN 10; 325 MG/1; MG/1
1 TABLET ORAL EVERY 4 HOURS PRN
Status: DISCONTINUED | OUTPATIENT
Start: 2024-09-30 | End: 2024-09-30 | Stop reason: HOSPADM

## 2024-09-30 RX ORDER — BUPIVACAINE HCL/0.9 % NACL/PF 0.125 %
PLASTIC BAG, INJECTION (ML) EPIDURAL AS NEEDED
Status: DISCONTINUED | OUTPATIENT
Start: 2024-09-30 | End: 2024-09-30 | Stop reason: SURG

## 2024-09-30 RX ORDER — IBUPROFEN 600 MG/1
600 TABLET, FILM COATED ORAL EVERY 6 HOURS PRN
Status: DISCONTINUED | OUTPATIENT
Start: 2024-09-30 | End: 2024-09-30 | Stop reason: HOSPADM

## 2024-09-30 RX ORDER — DROPERIDOL 2.5 MG/ML
0.62 INJECTION, SOLUTION INTRAMUSCULAR; INTRAVENOUS ONCE AS NEEDED
Status: DISCONTINUED | OUTPATIENT
Start: 2024-09-30 | End: 2024-09-30 | Stop reason: HOSPADM

## 2024-09-30 RX ORDER — NALOXONE HCL 0.4 MG/ML
0.4 VIAL (ML) INJECTION AS NEEDED
Status: DISCONTINUED | OUTPATIENT
Start: 2024-09-30 | End: 2024-09-30 | Stop reason: HOSPADM

## 2024-09-30 RX ORDER — ROCURONIUM BROMIDE 10 MG/ML
INJECTION, SOLUTION INTRAVENOUS AS NEEDED
Status: DISCONTINUED | OUTPATIENT
Start: 2024-09-30 | End: 2024-09-30 | Stop reason: SURG

## 2024-09-30 RX ORDER — ASPIRIN 81 MG/1
81 TABLET ORAL DAILY
COMMUNITY
End: 2024-10-14 | Stop reason: ALTCHOICE

## 2024-09-30 RX ORDER — LIDOCAINE HYDROCHLORIDE 10 MG/ML
0.5 INJECTION, SOLUTION EPIDURAL; INFILTRATION; INTRACAUDAL; PERINEURAL ONCE AS NEEDED
Status: DISCONTINUED | OUTPATIENT
Start: 2024-09-30 | End: 2024-09-30 | Stop reason: HOSPADM

## 2024-09-30 RX ORDER — FLUMAZENIL 0.1 MG/ML
0.2 INJECTION INTRAVENOUS AS NEEDED
Status: DISCONTINUED | OUTPATIENT
Start: 2024-09-30 | End: 2024-09-30 | Stop reason: HOSPADM

## 2024-09-30 RX ADMIN — LIDOCAINE HYDROCHLORIDE 100 MG: 20 INJECTION, SOLUTION EPIDURAL; INFILTRATION; INTRACAUDAL; PERINEURAL at 13:11

## 2024-09-30 RX ADMIN — Medication 200 MCG: at 13:23

## 2024-09-30 RX ADMIN — ROCURONIUM 50 MG: 50 INJECTION, SOLUTION INTRAVENOUS at 13:11

## 2024-09-30 RX ADMIN — Medication 300 MCG: at 13:29

## 2024-09-30 RX ADMIN — PROPOFOL 80 MG: 10 INJECTION, EMULSION INTRAVENOUS at 13:11

## 2024-09-30 RX ADMIN — SUGAMMADEX 200 MG: 100 INJECTION, SOLUTION INTRAVENOUS at 13:46

## 2024-09-30 RX ADMIN — DEXAMETHASONE SODIUM PHOSPHATE 4 MG: 4 INJECTION INTRA-ARTICULAR; INTRALESIONAL; INTRAMUSCULAR; INTRAVENOUS; SOFT TISSUE at 13:46

## 2024-09-30 RX ADMIN — SODIUM CHLORIDE, POTASSIUM CHLORIDE, SODIUM LACTATE AND CALCIUM CHLORIDE 1000 ML: 600; 310; 30; 20 INJECTION, SOLUTION INTRAVENOUS at 11:40

## 2024-09-30 RX ADMIN — ONDANSETRON 4 MG: 2 INJECTION INTRAMUSCULAR; INTRAVENOUS at 13:46

## 2024-09-30 NOTE — INTERVAL H&P NOTE
H&P reviewed. The patient was examined and there are no changes to the H&P.      Electronically signed by Lucas Johnson MD, 9/30/2024, 13:02 CDT

## 2024-09-30 NOTE — ANESTHESIA PROCEDURE NOTES
Airway  Urgency: elective    Date/Time: 9/30/2024 1:11 PM  Airway not difficult    General Information and Staff    Patient location during procedure: OR  CRNA/CAA: Brandt Logan CRNA    Indications and Patient Condition  Indications for airway management: airway protection    Preoxygenated: yes  Mask difficulty assessment: 1 - vent by mask    Final Airway Details  Final airway type: endotracheal airway      Successful airway: ETT  Cuffed: yes   Successful intubation technique: direct laryngoscopy  Facilitating devices/methods: intubating stylet  Endotracheal tube insertion site: oral  Blade: Eller  Blade size: 2  ETT size (mm): 8.0  Cormack-Lehane Classification: grade I - full view of glottis  Placement verified by: chest auscultation and capnometry   Cuff volume (mL): 10  Measured from: teeth  ETT/EBT  to teeth (cm): 21  Number of attempts at approach: 1  Assessment: lips, teeth, and gum same as pre-op and atraumatic intubation

## 2024-09-30 NOTE — ANESTHESIA PREPROCEDURE EVALUATION
Anesthesia Evaluation     Patient summary reviewed   no history of anesthetic complications:   NPO Solid Status: > 8 hours             Airway   Mallampati: II  Dental    (+) upper dentures and lower dentures    Pulmonary    (+) a smoker, COPD,  (-) asthma, sleep apnea  Cardiovascular   Exercise tolerance: good (4-7 METS)    (+) hypertension, CAD, cardiac stents (2008) , dysrhythmias Paroxysmal Atrial Fib, hyperlipidemia  (-) pacemaker, past MI, angina      Neuro/Psych  (-) seizures, TIA, CVA  GI/Hepatic/Renal/Endo    (+) thyroid problem   (-) GERD, liver disease, no renal disease, diabetes    Musculoskeletal     Abdominal    Substance History      OB/GYN          Other                    Anesthesia Plan    ASA 3     general     intravenous induction     Anesthetic plan, risks, benefits, and alternatives have been provided, discussed and informed consent has been obtained with: patient.    CODE STATUS:

## 2024-09-30 NOTE — OP NOTE
Procedure Note (AdvancedBronchoscopy)    Date of Operation: 09/30/24  Pre-op Diagnosis: Multiple lung nodules  Post-op Diagnosis: Same  Surgeon: Lucas Johnson MD  Anesthesia: General    Operation: Flexible fiberoptic bronchoscopy, Bronchoscopy, Diagnostic, Ion robotic shape sensing navigational bronchoscopy, Radial probe endobronchial ultrasound, Linear endobronchial ultrasound, Bronchial wash, Transbronchial needle aspirate of mediastinum, and Transbronchial needle aspirate of lung with navigational and fluoroscopic guidance  Findings: concentric view, 2.5 cm paratracheal node, marvin positive.  Specimen: bronch wash, tbna lung RUL, tbna mediastinum 4R  Estimated Blood Loss: Minimal  Complications: none    Indications and History:  The patient is a 89 y.o.  female with Multiple lung nodules.  The risks, benefits, complications, treatment options and expected outcomes were discussed with the patient.  The possibilities of reaction to medication, pulmonary aspiration, perforation of a viscus, bleeding, failure to diagnose a condition and creating a complication requiring transfusion or operation were discussed with the patient who freely signed the consent.  Time out was taken prior to the procedure.    Description of Procedure:    After the induction of general anesthesia, the patient was positioned supine and the Olympus BF type P180 bronchoscope was introduced through the endotracheal tube.  The scope was then passed into the trachea.     The trachea, mainstem bronchi, RUL, RML, Bronchus Intermedius, RLL, ISREAL, ISREAL upper division and lingula, and LLL, and all primary segmental airways were examined and were normal except as described below:    Endobronchial findings:  Trachea: Normal mucosa  [place]: Sharp  Right main bronchus: Normal mucosa  Right upper lobe bronchus: Normal mucosa  Right middle lobe bronchus: Normal mucosa  Right lower lobe bronchus: Normal mucosa  Left main bronchus: Normal mucosa  Left  upper lobe bronchus: Normal mucosa  Left lower lobe bronchus: Normal mucosa    The conventional bronchoscope was removed .    Using the planning laptop and Planpoint software, the lesion of interest was identified, and marked, a pathway was generated, and anatomic borders were identified.The ION system was magnetically docked to the ETT. The shape sensing catheter with vision probe was introduced via the  into the ETT.    Registration was started by advancing the bronchoscope into the right and left mainstem bronchi. The main tyesha was confirmed by rotating the live-view image to match the navigation-view image of the main tyesha. Registration was completed by advancing the catheter into the identified the lobar airway. There was no significant visual divergence. Navigation was complicated by : endobronchial mucous impairing visualization. Using the , the shape sensing bronchoscope was advanced to the target lesion. The mobile C-Arm was brought in, and the vision probe was removed. A peripheral radial ultrasound probe was utilized to better localize the lesion in the right upper lobe posterior segment, giving a concentric view.     Transbronchial needle aspirations of the target lesion were performed using an Intuitive Flexision 23 gauge needle and sent for routine cytology. The procedure was guided by fluoroscopy and radial ultrasound. Transbronchial needle aspiration technique was selected because the sampling site was not accessible using standard bronchoscopic techniques. 5 needle aspirations were obtained. Rapid On-Site Evaluation: positive     The vision probe was reintruced to examine the biopsied area, and then the vision probe, and shape sensing catheter was extracted and catheter guide was disconnected.  The Olympus BF-UG136X EBUS bronchoscope was introduced and the mediastinum was examined via linear ultrasound.  Findings: 2.5 cm 4R node. TBNA under ultrasound guidance was  collected using an Olympus VisiShot 2 22g needle from station 4r x 5 passes, Rapid onsite evaluation: positive.  Iced saline was instilled and aspirated in small aliquots between needle passes for hemostasis. No ongoing bleeding was identified.  The bronchoscope was removed.    The patient was undocked from the ion robot arm.  The Patient was extubated and taken to the Recovery Room in satisfactory condition.      Lucas Johnson MD at 13:47 CDT, 9/30/2024

## 2024-10-02 LAB
BACTERIA SPEC RESP CULT: NORMAL
GRAM STN SPEC: NORMAL

## 2024-10-04 ENCOUNTER — TELEPHONE (OUTPATIENT)
Dept: PULMONOLOGY | Facility: CLINIC | Age: 89
End: 2024-10-04
Payer: MEDICARE

## 2024-10-04 NOTE — TELEPHONE ENCOUNTER
Yes, assure her that the biopsy results are not back and it is unlikely that they will be back before Monday.  I am copying this to Dr. Johnson as he will be back in the office on Monday.  Let the patient's daughter know that Dr. Johnson will call once the results are available.  Thank you.

## 2024-10-04 NOTE — TELEPHONE ENCOUNTER
Daughter was calling about biopsy results.   I told her that they were not back yet but would send you a message to keep an eye on them if they come back before the weekend.

## 2024-10-07 DIAGNOSIS — C34.81 SMALL CELL CARCINOMA OF OVERLAPPING SITES OF RIGHT LUNG: Primary | ICD-10-CM

## 2024-10-07 LAB
BEAKER LAB AP INTRAOPERATIVE CONSULTATION: NORMAL
CYTO UR: NORMAL
LAB AP CASE REPORT: NORMAL
LAB AP DIAGNOSIS COMMENT: NORMAL
LAB AP SPECIAL STAINS: NORMAL
Lab: NORMAL
PATH REPORT.FINAL DX SPEC: NORMAL
PATH REPORT.GROSS SPEC: NORMAL

## 2024-10-07 NOTE — PROGRESS NOTES
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  Electronically signed by Lucas Johnson MD, 10/7/2024, 17:08 CDT

## 2024-10-08 NOTE — ANESTHESIA POSTPROCEDURE EVALUATION
Patient: Alka Nicolas    Procedure Summary       Date: 09/30/24 Room / Location:  [facility] 02 /  [facility]    Anesthesia Start: 1305 Anesthesia Stop: 1405    Procedures:       BRONCHOSCOPY WITH ION ROBOT and BRONCHOSCOPY WITH ENDOBRONCHIAL ULTRASOUND (Bronchus)      BRONCHOSCOPY WITH ENDOBRONCHIAL ULTRASOUND (Bronchus) Diagnosis:       Multiple lung nodules      (Multiple lung nodules [R91.8])    Surgeons: Lucas Johnson MD Provider: Brandt Logan CRNA    Anesthesia Type: general ASA Status: 3            Anesthesia Type: general    Vitals  Vitals Value Taken Time   /48 09/30/24 1419   Temp 97.7 °F (36.5 °C) 09/30/24 1419   Pulse 56 09/30/24 1419   Resp 14 09/30/24 1419   SpO2 95 % 09/30/24 1419           Post Anesthesia Care and Evaluation      Comments: Patient discharged from PACU per protocol and RN.

## 2024-10-09 NOTE — PROGRESS NOTES
[facility] HEMATOLOGY & ONCOLOGY  [address]  [facility] [id number] [phone]    Patient Name: Alka Nicolas  Encounter Date: 10/14/2024  YOB: 1935  Patient Number: [id number]    REASON FOR CONSULTATION: Small cell lung carcinoma    HISTORY OF PRESENT ILLNESS: Alka Nicolas is an 89 yoF with medical history includes ex nicotine dependence, COPD, paroxysmal atrial fibrillation (Eliquis), CAD (PTCA to small RCA, 2018), hypothyroidism, hypertension, hyperlipidemia, multiple lung nodules and recently diagnosed small cell lung carcinoma.    -4/10/2024-CTA chest-no PE. In the posterolateral RIGHT upper lobe (series 5, image 51) there is a 1.5 x 1.9 x 1.3 cm nodular density abutting the pleural surface. I am suspicious that this may represent a focal infectious/inflammatory process; however, a true pulmonary nodule/neoplasm is not excluded and per Fleischner Society guidelines, in 3 months consider follow-up CT, PET/CT, or biopsy.    -8/8/2024-CT chest without contrast-1.  Enlarging spiculated 2.7 cm pulmonary nodule in the inferior right  upper lobe broadly abutting the pleura and minor fissure. This is favored to represent a primary lung neoplasm. Biopsy and staging PET/CT is recommended. 2.  1.0 cm right upper lobe pulmonary nodule with lobulated margins concerning for either a metastatic lesion or second primary lung neoplasm. 3.  Lytic lesion in the left posterolateral third rib, highly suspicious for osseous metastasis. 4.  Multiple small 3 to 4 mm pulmonary nodules are present in both lungs. These are likely infectious/inflammatory in nature though pulmonary metastasis is also in the differential and special attention on follow-up imaging is recommended.    -9/25/2024- CT chest wo contrast-Enlarging 3.3 cm spiculated mass in the inferior right upper lobe broadly abutting the chest wall and minor fissure. This is highly  suspicious for a primary lung neoplasm. Enlarging 1.4 cm right upper lobe pulmonary nodule, most likely representing an intrapulmonary metastasis or a second primary lung neoplasm. New right hilar and right paratracheal mediastinal lymphadenopathy, highly concerning for berta spread of neoplasm. Enlarging 16 mm lytic lesion in the left posterior lateral third rib, most likely representing osseous metastasis.    -9/30/2024-flexible fiberoptic bronchoscopy, Ion sensing navigational bronchoscopy, EBUS bronchial wash, transbronchial needle aspirate of mediastinum and lung.  Findings: 2.5 cm paratracheal node, results positive.  Final diagnosis: 1.  Lung, right upper lobe, Ion fine-needle aspiration, smear (1) and cellblock: Positive for malignant cells, consistent with small cell carcinoma. 2.  Station 4R lymph node, endobronchial ultrasound-guided fine-needle aspiration, smear (1) and ThinPrep preparation (1): A.  Positive for malignant cells, consistent with metastatic small cell carcinoma. B.  Background lymphocytes present. 3.  Bronchial washings, ThinPrep preparation (1): A.  Degenerated cells present, suspicious for small cell carcinoma.  B.  Ciliated columnar bronchial epithelial cells and macrophages.  AJCC stage: pTX pNX    Today the patient is seen for management considerations    I have reviewed the HPI and verified with the patient the accuracy of it. No changes to interval history since the information was documented. Vance Davis MD 10/14/24      LABS    Lab Results - Last 18 Months   Lab Units 09/25/24  1210 04/10/24  1039   HEMOGLOBIN g/dL 13.1 12.3   HEMATOCRIT % 40.5 38.4   MCV fL 89.4 88.7   WBC 10*3/mm3 8.06 8.11   RDW % 13.4 12.8   MPV fL 9.9 9.9   PLATELETS 10*3/mm3 196 151   IMM GRAN % %  --  0.5   NEUTROS ABS 10*3/mm3  --  5.89   LYMPHS ABS 10*3/mm3  --  1.41   MONOS ABS 10*3/mm3  --  0.46   EOS ABS 10*3/mm3  --  0.29   BASOS ABS 10*3/mm3  --  0.02   IMMATURE GRANS (ABS) 10*3/mm3  --  0.04    NRBC /100 WBC  --  0.0       Lab Results - Last 18 Months   Lab Units 09/25/24  1210 04/10/24  1039   GLUCOSE mg/dL 119* 119*   SODIUM mmol/L 139 142   POTASSIUM mmol/L 3.8 4.1   CO2 mmol/L 24.0 26.0   CHLORIDE mmol/L 105 107   ANION GAP mmol/L 10.0 9.0   CREATININE mg/dL 0.99 1.05*   BUN mg/dL 22 19   BUN / CREAT RATIO  22.2 18.1   CALCIUM mg/dL 8.4* 9.3   ALK PHOS U/L  --  144*   TOTAL PROTEIN g/dL  --  6.7   ALT (SGPT) U/L  --  11   AST (SGOT) U/L  --  13   BILIRUBIN mg/dL  --  0.6   ALBUMIN g/dL  --  4.1   GLOBULIN gm/dL  --  2.6           PAST MEDICAL HISTORY:  ALLERGIES:  No Known Allergies  CURRENT MEDICATIONS:  Outpatient Encounter Medications as of 10/14/2024   Medication Sig Dispense Refill    albuterol sulfate  (90 Base) MCG/ACT inhaler Inhale 2 puffs 4 (Four) Times a Day. 6.7 g 5    amLODIPine (NORVASC) 5 MG tablet TAKE ONE TABLET BY MOUH DAILY 90 tablet 1    amoxicillin (AMOXIL) 250 MG capsule Take 1 capsule by mouth Daily.      apixaban (ELIQUIS) 2.5 MG tablet tablet Take 1 tablet by mouth Every 12 (Twelve) Hours. Indications: Atrial Fibrillation 60 tablet 0    atorvastatin (LIPITOR) 40 MG tablet TAKE ONE TABLET BY MOUTH EVERY NIGHT. 90 tablet 4    carvedilol (COREG) 12.5 MG tablet Take 1 tablet by mouth 2 (Two) Times a Day. 60 tablet 11    cloNIDine (CATAPRES) 0.1 MG tablet Take 1 tablet by mouth Daily As Needed. For blood pressure > 180/90      Hydrocortisone, Perianal, (ANUSOL-HC) 2.5 % rectal cream Insert  into the rectum As Needed.      levothyroxine (SYNTHROID, LEVOTHROID) 100 MCG tablet Take 1 tablet by mouth Daily. 90 tablet 0    Multiple Vitamins-Minerals (PRESERVISION AREDS 2+MULTI VIT PO) Take 1 capsule by mouth Daily.      potassium chloride (K-DUR,KLOR-CON) 10 MEQ CR tablet Take 1 tablet by mouth Daily.      [DISCONTINUED] aspirin 81 MG EC tablet Take 1 tablet by mouth Daily.       No facility-administered encounter medications on file as of 10/14/2024.     ADULT  ILLNESSES:  Patient Active Problem List   Diagnosis Code    CAD (coronary artery disease) I25.10    Hypertension, benign I10    Hyperlipidemia E78.5    Palpitations R00.2    Hypothyroidism (acquired) E03.9    Psoriasis L40.9    Vitamin B12 deficiency E53.8    Grade 1 out of 6 intensity murmur R01.1    Unstable angina I20.0    COPD, mild J44.9    Osteoarthritis M19.90    Pernicious anemia D51.0    Acute kidney injury N17.9    Hypokalemia E87.6    Metabolic acidosis E87.20    Acute cystitis N30.00    Pneumonitis J98.4    Chronic kidney disease, stage IV (severe) N18.4    Elevated troponin R79.89    Failure to thrive in adult R62.7    Moderate malnutrition E44.0    Paroxysmal atrial fibrillation I48.0    Acute UTI N39.0    Upper respiratory tract infection J06.9    Acute cough R05.1    Multiple lung nodules R91.8    Mediastinal adenopathy R59.0     SURGERIES:  Past Surgical History:   Procedure Laterality Date    APPENDECTOMY      BRONCHOSCOPY N/A 9/30/2024    Procedure: BRONCHOSCOPY WITH ENDOBRONCHIAL ULTRASOUND;  Surgeon: Lucas Johnson MD;  Location: [facility] OR;  Service: Pulmonary;  Laterality: N/A;  preop; lung nodule   postop lung nodules   UMAIR Gardner    BRONCHOSCOPY WITH ION ROBOTIC ASSIST N/A 9/30/2024    Procedure: BRONCHOSCOPY WITH ION ROBOT and BRONCHOSCOPY WITH ENDOBRONCHIAL ULTRASOUND;  Surgeon: Lucas Johnson MD;  Location: [facility] OR;  Service: Robotics - Pulmonary;  Laterality: N/A;  preop; lung nodules  postop lung nodules   UMAIR Gardner    CARDIAC CATHETERIZATION      CARDIAC CATHETERIZATION Left 10/21/2020    Procedure: Cardiac Catheterization/Vascular Study;  Surgeon: Marcos Ratliff MD;  Location: [facility] CATH INVASIVE LOCATION;  Service: Cardiology;  Laterality: Left;    CHOLECYSTECTOMY      HYSTERECTOMY      total     HEALTH MAINTENANCE ITEMS:  Health Maintenance Due   Topic Date Due    TDAP/TD VACCINES (1 - Tdap) Never done    ZOSTER VACCINE (1 of 2) Never done     LIPID PANEL  2024    ANNUAL WELLNESS VISIT  2024    INFLUENZA VACCINE  2024    COVID-19 Vaccine ( season) 2024       <no information>  Last Completed Colonoscopy       This patient has no relevant Health Maintenance data.          Immunization History   Administered Date(s) Administered    ABRYSVO (RSV, 60+ or pregnant women 32-36 wks) 2024    COVID-19 (PFIZER) 12YRS+ (COMIRNATY) 2023    COVID-19 (PFIZER) BIVALENT 12+YRS 2022    COVID-19 (PFIZER) Purple Cap Monovalent 2021, 2021, 2021    Covid-19 (Pfizer) Gray Cap Monovalent 2022    Fluad Quad 65+ 2020, 10/03/2023    Fluzone High-Dose 65+YRS 2016, 2016, 10/24/2017, 10/17/2018    Fluzone High-Dose 65+yrs 10/11/2021, 2022    Pneumococcal Conjugate 13-Valent (PCV13) 2015    Pneumococcal Polysaccharide (PPSV23) 10/21/2005, 2006, 10/21/2013     Last Completed Mammogram       This patient has no relevant Health Maintenance data.              FAMILY HISTORY:  Family History   Problem Relation Age of Onset    Diabetes Mother     Heart disease Father     Hyperlipidemia Father     Hypertension Father     Stroke Father     Breast cancer Neg Hx      SOCIAL HISTORY:  Social History     Socioeconomic History    Marital status:    Tobacco Use    Smoking status: Some Days     Current packs/day: 0.00     Types: Cigarettes     Last attempt to quit: 2020     Years since quittin.5    Smokeless tobacco: Never   Vaping Use    Vaping status: Never Used   Substance and Sexual Activity    Alcohol use: No    Drug use: No    Sexual activity: Not Currently     Partners: Male       REVIEW OF SYSTEMS:  Review of Systems   Constitutional:  Positive for appetite change (Poor) and unexpected weight loss (21 pounds in the past 5). Negative for activity change.        Manages her personal ADLs, including most chores chores but no longer runs errands and no longer drives.   "She is up and about more than half the time.   HENT:  Positive for hearing loss.    Eyes:  Positive for blurred vision (Macular degeneration).   Respiratory:  Positive for cough and shortness of breath (Baseline VALENCIA with SOB including routine activities).         Smoker, age 15-85--1/2 to 1 pack/day   Cardiovascular:  Positive for chest pain (Pleuritic chest discomfort).   Gastrointestinal:  Positive for anal bleeding (Neuro).   Endocrine: Negative.    Genitourinary:  Positive for urinary incontinence.   Musculoskeletal:  Positive for arthralgias (Chronic), back pain (\"arthritis\") and myalgias (Intermittent nocturnal leg cramps).   Skin: Negative.    Allergic/Immunologic: Negative.    Neurological: Negative.    Hematological: Negative.    Psychiatric/Behavioral: Negative.         /64   Pulse 63   Temp 97.1 °F (36.2 °C) (Temporal)   Resp 18   Ht 152.4 cm (60\")   Wt 56.1 kg (123 lb 11.2 oz)   LMP  (LMP Unknown)   BMI 24.16 kg/m²  Body surface area is 1.52 meters squared.  Pain Score    10/14/24 1437   PainSc: 0-No pain       Physical Exam  Vitals and nursing note reviewed.   Constitutional:       Comments: Pleasant, cooperative, slender, modestly kept elderly female, ambulatory.  ECOG 2.  She is accompanied by her spouse John, and her daughters Hui and Allison   HENT:      Head: Normocephalic and atraumatic.   Eyes:      General: No scleral icterus.     Extraocular Movements: Extraocular movements intact.      Pupils: Pupils are equal, round, and reactive to light.   Cardiovascular:      Rate and Rhythm: Normal rate.   Pulmonary:      Effort: Pulmonary effort is normal.      Comments: Distant breath sounds  Abdominal:      General: Abdomen is flat.      Tenderness: There is no abdominal tenderness.   Musculoskeletal:         General: Normal range of motion.      Cervical back: Normal range of motion.   Lymphadenopathy:      Cervical: No cervical adenopathy.   Skin:     General: Skin is warm. "   Neurological:      General: No focal deficit present.      Mental Status: She is alert and oriented to person, place, and time.   Psychiatric:         Mood and Affect: Mood normal.         Behavior: Behavior normal.         Thought Content: Thought content normal.       ASSESSMENT:   1. Small cell carcinoma:   Stage: IV (cT3, cN2, M1), extensive stage   Tumor [place]:     -4/10/2024-CTA chest-no PE. In the posterolateral RIGHT upper lobe (series 5, image 51) there is a 1.5 x 1.9 x 1.3 cm nodular density abutting the pleural surface. I am suspicious that this may represent a focal infectious/inflammatory process; however, a true pulmonary nodule/neoplasm is not excluded and per Fleischner Society guidelines, in 3 months consider follow-up CT, PET/CT, or biopsy.  -8/8/2024-CT chest without contrast-1.  Enlarging spiculated 2.7 cm pulmonary nodule in the inferior right upper lobe broadly abutting the pleura and minor fissure. This is favored to represent a primary lung neoplasm. Biopsy and staging PET/CT is recommended. 2.  1.0 cm right upper lobe pulmonary nodule with lobulated margins concerning for either a metastatic lesion or second primary lung neoplasm. 3.  Lytic lesion in the left posterolateral third rib, highly suspicious for osseous metastasis. 4.  Multiple small 3 to 4 mm pulmonary nodules are present in both lungs. These are likely infectious/inflammatory in nature though pulmonary metastasis is also in the differential and special attention on follow-up imaging is recommended.  -9/25/2024- CT chest wo contrast-Enlarging 3.3 cm spiculated mass in the inferior right upper lobe broadly abutting the chest wall and minor fissure. This is highly suspicious for a primary lung neoplasm. Enlarging 1.4 cm right upper lobe pulmonary nodule, most likely representing an intrapulmonary metastasis or a second primary lung neoplasm. New right hilar and right paratracheal mediastinal lymphadenopathy, highly concerning  for berta spread of neoplasm. Enlarging 16 mm lytic lesion in the left posterior lateral third rib, most likely representing osseous metastasis.  -9/30/2024-flexible fiberoptic bronchoscopy, Ion sensing navigational bronchoscopy, EBUS bronchial wash, transbronchial needle aspirate of mediastinum and lung.  Findings: 2.5 cm paratracheal node, results positive. Final diagnosis: 1.  Lung, right upper lobe, Ion fine-needle aspiration, smear (1) and cellblock: Positive for malignant cells, consistent with small cell carcinoma. 2.  Station 4R lymph node, endobronchial ultrasound-guided fine-needle aspiration, smear (1) and ThinPrep preparation (1): A.  Positive for malignant cells, consistent with metastatic small cell carcinoma. B.  Background lymphocytes present. 3.  Bronchial washings, ThinPrep preparation (1): A.  Degenerated cells present, suspicious for small cell carcinoma. B.  Ciliated columnar bronchial epithelial cells and macrophages.  AJCC stage: pTX pNX    Complications of Tumor:   Pleurisy  Tumor Status: Systemically untreated.    2.  COPD  3.  Ex tobacco smoker-20 pack years.  Quit 4 years ago  4.  CKD 3  5.  Hypothyroidism  6.  Paroxysmal atrial fibrillation.  Eliquis maintenance  7.  CAD with remote PTCA small RCA in 2008       8.  Poor overall prognosis     PLAN:   1.   Re: Conference with the patient and apprised of the diagnostic information as outlined above.   2.  Schedule brain MRI and PET scan at [facility].    3.  Draw CMP, CBC with differential, serum iron, Fe sat, ferritin, B12, folate, retic count, and CEA today-if patient agrees (she is declining palliative systemic therapy).   4.  Appoint to general surgery Re: Port placement-if patient agrees.   5.  Teaching sheets for carboplatin, -16, and atezolizumab        6.   Re: Discussed the prognosis of extensive stage disease (likely incurable disease with median survival of 2-4 months untreated, and 7 to 11 months if effectively treated).  The option of chemotherapy and atezolizumab, potential toxicities (to include but not limited to: asthenia, mucositis, nausea, emesis, diarrhea, renal failure/nephrotoxicity, ototoxicity, anaphylaxis, vision loss, electrolyte disturbances, hepatotoxicity, neuropathy, seizures, optic neuritis, pulmonary toxicity (fibrosis/pneumonitis), Tucker Jose Antonio syndrome, extravasation necrosis, alopecia, fever/rigors, dizziness, taste changes, hypotension, facial flushing, rash, anorexia, constipation, myelosuppression, immune mediated reactions, pneumonitis, interstitial lung disease, pleural effusion, dyspnea, pulmonary embolism hepatitis, colitis, hypophysitis, hypothyroidism, hyperthyroidism, adrenal insufficiency, type 1 diabetes, pancreatitis, meningoencephalitis, myasthenia gravis, Guillain-Barré syndrome, myocarditis, ocular toxicity, sepsis, severe skin toxicity, severe infusion reactions, hyponatremia, anemia), and benefits (response rate of 60-80%) are also discussed at length. Questions are answered to their apparent satisfaction.  They are made aware that the goal of therapy is palliative and not curative.  Today, on multiple occasions the patient states that she will not do systemic therapy.             7.  Anticipate treatment C1 D1 (plan every 21 days x 4 cycles - then maintenance atezolizumab) to be scheduled Monday, ; C2D1,  at [facility]:   Due to borderline PS and advanced age, will dose reduce chemo by 20 % from cycle 1.  At the end of this visit the patient (at the behest of her daughters) agreed to discuss the issues further with her daughters and her spouse but has already strongly expressed that she will forego systemic therapy in favor of comfort care/best supportive care/hospice care     Carboplatin AUC 5 IVPB over 30 min D1.   -16 80 mg/m2 IVPB over 120 min D1, D2 and D3.   atezolizumab 1200 mg day 1 IV  Draw CMP on day of chemo.               8. Premedicate with:   Aloxi 0.25 mg IVP, day 1 only.    Decadron 10 mg IVPB over 20-30 min, days 1, 2, and 3.   Emend 150 mg IV day 1 only.               9.  Neulasta 6 mg subcutaneously on days 4 of chemo -[facility].         10. CMP, Mg and CBC weekly on Monday, with Procrit 40,000 units subcutaneously if HGB less than 10 and Hct less than 30; - Neupogen 480 mcg subcutaneously x 3 days if ANC less than 1.0 at [facility].      11.  Rx: Zofran 8 mg p.o. 3 times daily as needed #30 - eRx to pharmacy.                 Norco 5 po q 6 hrs prn # 60     12.  Continue ongoing management per primary care physician and other specialists.  Has an appointment with radiation oncology next week possibly to discuss palliative radiation to the symptomatic left posterior lateral third rib lesion.  13.  Refer to palliative care/hospice if patient declined systemic therapy  14.  Return to the office with preoffice CMP and CBC with differential in 5 weeks (or prn if she decides to proceed with comfort care)- will schedule surveillance imaging after every 2-4 cycles of therapy.     15.  Importance of Smoking Cessation discussed with patient and informed patient additional information will be on today's [facility]'s Flyer - Plan to Be Tobacco Free handout provided to patient        MEDICAL DECISION MAKING: High Complexity   AMOUNT OF DATA: Extensive   RISK OF COMPLICATIONS: Moderate to High      I spent ~76 minutes caring for Alka on this date of service. This time includes time spent by me in the following activities: preparing for the visit, reviewing tests, performing a medically appropriate examination and/or evaluation, counseling and educating the patient/family/caregiver, ordering medications, tests, or procedures and documenting information in the medical record

## 2024-10-14 ENCOUNTER — LAB (OUTPATIENT)
Dept: LAB | Facility: HOSPITAL | Age: 89
End: 2024-10-14
Payer: MEDICARE

## 2024-10-14 ENCOUNTER — CONSULT (OUTPATIENT)
Dept: ONCOLOGY | Facility: CLINIC | Age: 89
End: 2024-10-14
Payer: MEDICARE

## 2024-10-14 VITALS
HEART RATE: 63 BPM | HEIGHT: 60 IN | DIASTOLIC BLOOD PRESSURE: 64 MMHG | WEIGHT: 123.7 LBS | SYSTOLIC BLOOD PRESSURE: 130 MMHG | TEMPERATURE: 97.1 F | BODY MASS INDEX: 24.29 KG/M2 | RESPIRATION RATE: 18 BRPM

## 2024-10-14 DIAGNOSIS — C34.81 SMALL CELL CARCINOMA OF OVERLAPPING SITES OF RIGHT LUNG: Primary | ICD-10-CM

## 2024-10-14 PROBLEM — C34.80: Status: ACTIVE | Noted: 2024-10-14

## 2024-10-14 PROCEDURE — 99205 OFFICE O/P NEW HI 60 MIN: CPT | Performed by: INTERNAL MEDICINE

## 2024-10-14 PROCEDURE — 1126F AMNT PAIN NOTED NONE PRSNT: CPT | Performed by: INTERNAL MEDICINE

## 2024-10-14 RX ORDER — HYDROCODONE BITARTRATE AND ACETAMINOPHEN 5; 325 MG/1; MG/1
1 TABLET ORAL EVERY 6 HOURS PRN
Qty: 60 TABLET | Refills: 0 | Status: SHIPPED | OUTPATIENT
Start: 2024-10-14

## 2024-10-14 RX ORDER — ONDANSETRON 8 MG/1
8 TABLET, FILM COATED ORAL EVERY 8 HOURS PRN
Qty: 30 TABLET | Refills: 3 | Status: SHIPPED | OUTPATIENT
Start: 2024-10-14

## 2024-10-18 ENCOUNTER — HOSPITAL ENCOUNTER (OUTPATIENT)
Dept: RADIATION ONCOLOGY | Facility: HOSPITAL | Age: 89
Setting detail: RADIATION/ONCOLOGY SERIES
End: 2024-10-18
Payer: MEDICARE

## 2024-10-18 PROBLEM — F17.200 CURRENT SMOKER ON SOME DAYS: Status: ACTIVE | Noted: 2024-10-18

## 2024-10-18 NOTE — PROGRESS NOTES
[facility]  Radiation Oncology Clinic   Julien Gallo MD, FACR  Heberbelle Campos CHRISTIANO  _______________________________________________  [facility]  Department of Radiation Oncology  [address] [place] [id number]  Office: [phone]  Fax: [phone]    DATE: 10/21/2024  PATIENT: Alka Nicolas  1935                         MEDICAL RECORD #: [id number]    REASON FOR VISIT:    Chief Complaint   Patient presents with    Lung Cancer     1. 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 Mediastinal adenopathy    3. Current smoker on some days                                               REASON FOR VISIT:    Chief Complaint   Patient presents with    Lung Cancer     Alka Nicolas is a 89 y.o. female that has been referred to our clinic to be evaluated for consideration of radiotherapy to the lung for her recent diagnosis of extensive stage small cell carcinoma.    On presentation today she reports fatigue, cough, and SOB. Denies appetite change, unexpected weight change, nasuea/vomiting, diarrhea, light-headedness, weakness, and headaches. She follows .    HISTORY OF PRESENT ILLNESS:    04/10/2024 - CT Angiogram Chest:  No evidence of pulmonary embolus. The distal pulmonary arteries are slightly tortuous which is a nonspecific finding. Patchy groundglass in both lungs is most likely related to shallow inspiration and atelectasis. An underlying infectious/inflammatory process is felt to be possible as well but less likely.  In the posterolateral RIGHT upper lobe (series 5, image 51) there is a 1.5 x 1.9 x 1.3 cm nodular density abutting the pleural surface. I am suspicious that this may represent a focal infectious/inflammatory process; however, a true pulmonary nodule/neoplasm is not excluded and per Fleischner Society guidelines, in 3 months consider follow-up CT, PET/CT, or biopsy.  Shallow inspiration with  dependent atelectasis noted bilaterally.    08/08/2024 - CT Chest without contrast:  Enlarging spiculated 2.7 cm pulmonary nodule in the inferior right upper lobe broadly abutting the pleura and minor fissure. This is favored to represent a primary lung neoplasm. Biopsy and staging PET/CT is recommended  1.0 cm right upper lobe pulmonary nodule with lobulated margins on image 48 series 4 is concerning for either a metastatic lesion or second primary lung neoplasm.  Lytic lesion in the left posterolateral third rib, highly suspicious for osseous metastasis.   Multiple small 3 to 4 mm pulmonary nodules are present in both lungs. These are likely infectious/inflammatory in nature though pulmonary metastasis is also in the differential and special attention on follow-up imaging is recommended.    09/10/2024 - Appointment with :  We discussed the findings on ct  There are 2 nodules of concern,   these are both approachable with navigational bronchoscopy  We discussed risks of anesthesia and the procedure itself, which are considered strongly in pt her age.  We discussed particularly risks of cardiac event, pneumothorax, and bleeding although risks are not limited to these events  She indicates she is worried about this process and wants answers, and is willing to accept the risks.  We discussed hx cad, remote hx cardiac stent  Will ask cardiology to provide risk assessment  Tentatively plan bronchoscopy with ion robot and ebus.  We discussed dx of primary malignancy could be treated with sbrt.  Follow Up   Return in about 6 weeks (around 10/22/2024).    09/25/2024 - CT Chest without contrast:  Enlarging 3.3 cm spiculated mass in the inferior right upper lobe broadly abutting the chest wall and minor fissure. This is highly suspicious for a primary lung neoplasm.  Enlarging 1.4 cm right upper lobe pulmonary nodule, most likely representing an intrapulmonary metastasis or a second primary lung neoplasm.  New right  hilar and right paratracheal mediastinal lymphadenopathy, highly concerning for berta spread of neoplasm.  Enlarging 16 mm lytic lesion in the left posterior lateral third rib, most likely representing osseous metastasis.    09/30/2024 - Bronchoscopy with biopsy per :  Lung, right upper lobe, Ion fine-needle aspiration, smear (1) and cellblock:   Positive for malignant cells, consistent with small cell carcinoma.  Station 4R lymph node, endobronchial ultrasound-guided fine-needle aspiration, smear (1) and ThinPrep preparation (1):  Positive for malignant cells, consistent with metastatic small cell carcinoma.  Background lymphocytes present  Bronchial washings, ThinPrep preparation (1):  Degenerated cells present, suspicious for small cell carcinoma.  Ciliated columnar bronchial epithelial cells and macrophages.  AJCC stage: pTX pNX    10/14/2024 - Consult with :  ASSESSMENT:   Small cell carcinoma:   Stage: IV (cT3, cN2, M1), extensive stage   Tumor [place]:     -4/10/2024-CTA chest-no PE. In the posterolateral RIGHT upper lobe (series 5, image 51) there is a 1.5 x 1.9 x 1.3 cm nodular density abutting the pleural surface. I am suspicious that this may represent a focal infectious/inflammatory process; however, a true pulmonary nodule/neoplasm is not excluded and per Fleischner Society guidelines, in 3 months consider follow-up CT, PET/CT, or biopsy.  -8/8/2024-CT chest without contrast-1.  Enlarging spiculated 2.7 cm pulmonary nodule in the inferior right upper lobe broadly abutting the pleura and minor fissure. This is favored to represent a primary lung neoplasm. Biopsy and staging PET/CT is recommended. 2.  1.0 cm right upper lobe pulmonary nodule with lobulated margins concerning for either a metastatic lesion or second primary lung neoplasm. 3.  Lytic lesion in the left posterolateral third rib, highly suspicious for osseous metastasis. 4.  Multiple small 3 to 4 mm pulmonary nodules are  present in both lungs. These are likely infectious/inflammatory in nature though pulmonary metastasis is also in the differential and special attention on follow-up imaging is recommended.  -9/25/2024- CT chest wo contrast-Enlarging 3.3 cm spiculated mass in the inferior right upper lobe broadly abutting the chest wall and minor fissure. This is highly suspicious for a primary lung neoplasm. Enlarging 1.4 cm right upper lobe pulmonary nodule, most likely representing an intrapulmonary metastasis or a second primary lung neoplasm. New right hilar and right paratracheal mediastinal lymphadenopathy, highly concerning for berta spread of neoplasm. Enlarging 16 mm lytic lesion in the left posterior lateral third rib, most likely representing osseous metastasis.  -9/30/2024-flexible fiberoptic bronchoscopy, Ion sensing navigational bronchoscopy, EBUS bronchial wash, transbronchial needle aspirate of mediastinum and lung.  Findings: 2.5 cm paratracheal node, results positive. Final diagnosis: 1.  Lung, right upper lobe, Ion fine-needle aspiration, smear (1) and cellblock: Positive for malignant cells, consistent with small cell carcinoma. 2.  Station 4R lymph node, endobronchial ultrasound-guided fine-needle aspiration, smear (1) and ThinPrep preparation (1): A.  Positive for malignant cells, consistent with metastatic small cell carcinoma. B.  Background lymphocytes present. 3.  Bronchial washings, ThinPrep preparation (1): A.  Degenerated cells present, suspicious for small cell carcinoma. B.  Ciliated columnar bronchial epithelial cells and macrophages.  AJCC stage: pTX pNX  Complications of Tumor:   Pleurisy  Tumor Status: Systemically untreated.  COPD  Ex tobacco smoker-20 pack years.  Quit 4 years ago  CKD 3  Hypothyroidism  Paroxysmal atrial fibrillation.  Eliquis maintenance  CAD with remote PTCA small RCA in 2008       Poor overall prognosis  PLAN:    Re: Conference with the patient and apprised of the  diagnostic information as outlined above.   Schedule brain MRI and PET scan at [facility].    Draw CMP, CBC with differential, serum iron, Fe sat, ferritin, B12, folate, retic count, and CEA today-if patient agrees (she is declining palliative systemic therapy).   Appoint to general surgery Re: Port placement-if patient agrees.   Teaching sheets for carboplatin, -16, and atezolizumab   Re: Discussed the prognosis of extensive stage disease (likely incurable disease with median survival of 2-4 months untreated, and 7 to 11 months if effectively treated). The option of chemotherapy and atezolizumab, potential toxicities (to include but not limited to: asthenia, mucositis, nausea, emesis, diarrhea, renal failure/nephrotoxicity, ototoxicity, anaphylaxis, vision loss, electrolyte disturbances, hepatotoxicity, neuropathy, seizures, optic neuritis, pulmonary toxicity (fibrosis/pneumonitis), Tucker Jose Antonio syndrome, extravasation necrosis, alopecia, fever/rigors, dizziness, taste changes, hypotension, facial flushing, rash, anorexia, constipation, myelosuppression, immune mediated reactions, pneumonitis, interstitial lung disease, pleural effusion, dyspnea, pulmonary embolism hepatitis, colitis, hypophysitis, hypothyroidism, hyperthyroidism, adrenal insufficiency, type 1 diabetes, pancreatitis, meningoencephalitis, myasthenia gravis, Guillain-Barré syndrome, myocarditis, ocular toxicity, sepsis, severe skin toxicity, severe infusion reactions, hyponatremia, anemia), and benefits (response rate of 60-80%) are also discussed at length. Questions are answered to their apparent satisfaction.  They are made aware that the goal of therapy is palliative and not curative.  Today, on multiple occasions the patient states that she will not do systemic therapy.    Anticipate treatment C1 D1 (plan every 21 days x 4 cycles - then maintenance atezolizumab) to be scheduled Monday, ; C2D1,  at [facility]:   Due to borderline PS and advanced  age, will dose reduce chemo by 20 % from cycle 1.  At the end of this visit the patient (at the behest of her daughters) agreed to discuss the issues further with her daughters and her spouse but has already strongly expressed that she will forego systemic therapy in favor of comfort care/best supportive care/hospice care  Carboplatin AUC 5 IVPB over 30 min D1.   -16 80 mg/m2 IVPB over 120 min D1, D2 and D3.   atezolizumab 1200 mg day 1 IV  Draw CMP on day of chemo.  Premedicate with:   Aloxi 0.25 mg IVP, day 1 only.   Decadron 10 mg IVPB over 20-30 min, days 1, 2, and 3.   Emend 150 mg IV day 1 only.  Neulasta 6 mg subcutaneously on days 4 of chemo -[facility].   CMP, Mg and CBC weekly on Monday, with Procrit 40,000 units subcutaneously if HGB less than 10 and Hct less than 30; - Neupogen 480 mcg subcutaneously x 3 days if ANC less than 1.0 at [facility].   Rx: Zofran 8 mg p.o. 3 times daily as needed #30 - eRx to pharmacy.   [place] 5 po q 6 hrs prn # 60  Continue ongoing management per primary care physician and other specialists.  Has an appointment with radiation oncology next week possibly to discuss palliative radiation to the symptomatic left posterior lateral third rib lesion.  Refer to palliative care/hospice if patient declined systemic therapy  Return to the office with preoffice CMP and CBC with differential in 5 weeks (or prn if she decides to proceed with comfort care)- will schedule surveillance imaging after every 2-4 cycles of therapy.  Importance of Smoking Cessation discussed with patient and informed patient additional information will be on today's S.   [place] [facility]'s Flyer - Plan to Be Tobacco Free handout provided to patient       History obtained from  PATIENT, FAMILY, and CHART    PAST MEDICAL HISTORY  Past Medical History:   Diagnosis Date    Abdominal aortic aneurysm     Angina pectoris     Atherosclerosis of native coronary artery of native heart without angina pectoris     CAD (coronary  artery disease)     Esophageal reflux     GERD (gastroesophageal reflux disease)     History of PTCA     Hyperlipidemia     Hypertension     Hypertension, essential, benign     Lung cancer     Thyroid disease     Tobacco use disorder       PAST SURGICAL HISTORY  Past Surgical History:   Procedure Laterality Date    APPENDECTOMY      BRONCHOSCOPY N/A 2024    Procedure: BRONCHOSCOPY WITH ENDOBRONCHIAL ULTRASOUND;  Surgeon: Lucas Johnson MD;  Location:  [facility] OR;  Service: Pulmonary;  Laterality: N/A;  preop; lung nodule   postop lung nodules   UMAIR Gardner    BRONCHOSCOPY WITH ION ROBOTIC ASSIST N/A 2024    Procedure: BRONCHOSCOPY WITH ION ROBOT and BRONCHOSCOPY WITH ENDOBRONCHIAL ULTRASOUND;  Surgeon: Lucas Johnson MD;  Location:  [facility] OR;  Service: Robotics - Pulmonary;  Laterality: N/A;  preop; lung nodules  postop lung nodules   PCP Roe Gardner    CARDIAC CATHETERIZATION      CARDIAC CATHETERIZATION Left 10/21/2020    Procedure: Cardiac Catheterization/Vascular Study;  Surgeon: Marcos Ratliff MD;  Location:  [facility] CATH INVASIVE LOCATION;  Service: Cardiology;  Laterality: Left;    CHOLECYSTECTOMY      HYSTERECTOMY      total      FAMILY HISTORY  family history includes Diabetes in her mother; Heart disease in her father; Hyperlipidemia in her father; Hypertension in her father; Stroke in her father.    SOCIAL HISTORY  Social History     Tobacco Use    Smoking status: Former     Current packs/day: 0.00     Types: Cigarettes     Quit date: 2020     Years since quittin.5    Smokeless tobacco: Never   Vaping Use    Vaping status: Never Used   Substance Use Topics    Alcohol use: No    Drug use: No     ALLERGIES  Patient has no known allergies.     MEDICATIONS    Current Outpatient Medications:     albuterol sulfate  (90 Base) MCG/ACT inhaler, Inhale 2 puffs 4 (Four) Times a Day., Disp: 6.7 g, Rfl: 5    amLODIPine (NORVASC) 5 MG tablet, TAKE ONE TABLET BY [facility]  DAILY, Disp: 90 tablet, Rfl: 1    amoxicillin (AMOXIL) 250 MG capsule, Take 1 capsule by mouth Daily., Disp: , Rfl:     apixaban (ELIQUIS) 2.5 MG tablet tablet, Take 1 tablet by mouth Every 12 (Twelve) Hours. Indications: Atrial Fibrillation, Disp: 60 tablet, Rfl: 0    atorvastatin (LIPITOR) 40 MG tablet, TAKE ONE TABLET BY MOUTH EVERY NIGHT., Disp: 90 tablet, Rfl: 4    carvedilol (COREG) 12.5 MG tablet, Take 1 tablet by mouth 2 (Two) Times a Day., Disp: 60 tablet, Rfl: 11    cloNIDine (CATAPRES) 0.1 MG tablet, Take 1 tablet by mouth Daily As Needed. For blood pressure > 180/90, Disp: , Rfl:     HYDROcodone-acetaminophen (NORCO) 5-325 MG per tablet, Take 1 tablet by mouth Every 6 (Six) Hours As Needed for Moderate Pain., Disp: 60 tablet, Rfl: 0    Hydrocortisone, Perianal, (ANUSOL-HC) 2.5 % rectal cream, Insert  into the rectum As Needed., Disp: , Rfl:     levothyroxine (SYNTHROID, LEVOTHROID) 100 MCG tablet, Take 1 tablet by mouth Daily., Disp: 90 tablet, Rfl: 0    Multiple Vitamins-Minerals (PRESERVISION AREDS 2+MULTI VIT PO), Take 1 capsule by mouth Daily., Disp: , Rfl:     ondansetron (ZOFRAN) 8 MG tablet, Take 1 tablet by mouth Every 8 (Eight) Hours As Needed for Nausea or Vomiting., Disp: 30 tablet, Rfl: 3    potassium chloride (K-DUR,KLOR-CON) 10 MEQ CR tablet, Take 1 tablet by mouth Daily., Disp: , Rfl:   No current facility-administered medications for this visit.    The following portions of the patient's history were reviewed and updated as appropriate: allergies, current medications, past family history, past medical history, past social history, past surgical history and problem list.    REVIEW OF SYSTEMS  Review of Systems   Constitutional:  Positive for fatigue.   HENT:  Negative.     Eyes: Negative.    Respiratory:  Positive for cough (at night) and shortness of breath (stable per patient). Negative for hemoptysis.    Cardiovascular: Negative.    Gastrointestinal: Negative.    Endocrine: Negative.   "  Genitourinary: Negative.     Musculoskeletal: Negative.    Skin: Negative.    Neurological: Negative.    Hematological: Negative.    Psychiatric/Behavioral: Negative.       I have reviewed and confirmed the accuracy of the ROS as documented by the MA/LPN/RN Jose Gallo MD    PHYSICAL EXAM  VITAL SIGNS:   Vitals:    10/21/24 1421   BP: 130/53   Pulse: 58   SpO2: 98%  Comment: room air   Weight: 56.8 kg (125 lb 4.8 oz)   Height: 152.4 cm (60\")   PainSc: 0-No pain     Physical Exam  Vitals and nursing note reviewed.   Constitutional:       Appearance: Normal appearance.      Comments: The patient appears asymptomatic and relatively well for her advanced age and recent diagnosis   HENT:      Head: Normocephalic and atraumatic.      Mouth/Throat:      Mouth: Mucous membranes are moist.      Pharynx: Oropharynx is clear.   Eyes:      Extraocular Movements: Extraocular movements intact.      Pupils: Pupils are equal, round, and reactive to light.   Cardiovascular:      Rate and Rhythm: Normal rate and regular rhythm.      Heart sounds: Normal heart sounds.   Pulmonary:      Effort: Pulmonary effort is normal.      Breath sounds: Normal breath sounds.   Abdominal:      General: There is no distension.      Palpations: Abdomen is soft.      Tenderness: There is no abdominal tenderness.   Musculoskeletal:      Cervical back: Normal range of motion.   Lymphadenopathy:      Cervical: No cervical adenopathy.      Upper Body:      Right upper body: No supraclavicular or axillary adenopathy.      Left upper body: No supraclavicular or axillary adenopathy.   Neurological:      General: No focal deficit present.      Mental Status: She is alert and oriented to person, place, and time.      Cranial Nerves: No cranial nerve deficit.   Psychiatric:         Mood and Affect: Mood normal.         Behavior: Behavior normal.         Thought Content: Thought content normal.         Judgment: Judgment normal.        Performance Status: " ECOG (1) Restricted in physically strenuous activity, ambulatory and able to do work of light nature    Clinical Quality Measures  - Pain Documented by Standardized Tool, FPS Alka Nicolas reports a pain score of 0.  Given her pain assessment as noted, treatment options were discussed and the following options were decided upon as a follow-up plan to address the patient's pain:  No pain, no plan given .  Pain Medications               HYDROcodone-acetaminophen (NORCO) 5-325 MG per tablet Take 1 tablet by mouth Every 6 (Six) Hours As Needed for Moderate Pain.          - Body Mass Index Screening and Follow-Up Plan  BMI is within normal parameters. No other follow-up for BMI required.    - Tobacco Use: Screening and Cessation Intervention  Social History    Tobacco Use      Smoking status: Former        Packs/day: 0.00        Types: Cigarettes        Quit date: 2020        Years since quittin.5      Smokeless tobacco: Never  Smoking cessation information given in after visit summary.    - Advanced Care Planning Advance Care Planning   ACP discussion was held with the patient during this visit. Patient does not have an advance directive, information provided.    - PHQ-2 Depression Screening:  Little interest or pleasure in doing things? Not at all   Feeling down, depressed, or hopeless? Not at all   PHQ-2 Total Score 0     ASSESSMENT AND PLAN  1. 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 Mediastinal adenopathy    3. Current smoker on some days      No orders of the defined types were placed in this encounter.    RECOMMENDATIONS: Alka Nicolas was diagnosed with Stage: IV (cT3, cN2, M1), extensive stage, small cell carcinoma of the lung.  Completion clinical staging with PET/CT and brain MRI are pending.    I have extensively reviewed the risks, benefits and alternatives of therapy with this diagnosis. I have seen, examined and reviewed this patient's medication list,  appropriate labs and imaging studies as well as other physician notes. We discussed the goals and plans of care with the patient and family and answered all questions.     Following this discussion and in consideration of the diagnostic data/evaluation of the patient, I recommended a course of  the patient continue with Dr. Caldera's recommendation with the use of systemic therapy alone and no radiation therapy at this time.  The patient and her family expressed an understanding of standards of care for her stage, the appropriate use of radiation therapy, and that we are available should she proceed with systemic therapy and find that there is poor tolerance or disease progression we would consider palliative radiation therapy.    Continue ongoing management per primary care physician and other specialists.     Alka Nicolas  reports that she quit smoking about 4 years ago. Her smoking use included cigarettes. She has never used smokeless tobacco. I have educated her on the risk of diseases from using tobacco products such as cancer, COPD, and heart disease. I spent >10 minutes counseling the patient.    Thank you for allowing me to assist in this patients care.     Proceed with scheduled metastatic workup that includes PET/CT and brain MRI. Return if symptoms worsen or fail to improve.     Time Spent: I spent 60 minutes caring for Alka on this date of service. This time includes time spent by me in the following activities: preparing for the visit, reviewing tests, obtaining and/or reviewing a separately obtained history, performing a medically appropriate examination and/or evaluation, counseling and educating the patient/family/caregiver, ordering medications, tests, or procedures, referring and communicating with other health care professionals, documenting information in the medical record, and independently interpreting results and communicating that information with the patient/family/caregiver.    Jose Gallo MD  10/21/2024

## 2024-10-21 ENCOUNTER — CONSULT (OUTPATIENT)
Age: 89
End: 2024-10-21
Payer: MEDICARE

## 2024-10-21 ENCOUNTER — TELEPHONE (OUTPATIENT)
Dept: ONCOLOGY | Facility: CLINIC | Age: 89
End: 2024-10-21
Payer: MEDICARE

## 2024-10-21 VITALS
HEIGHT: 60 IN | SYSTOLIC BLOOD PRESSURE: 130 MMHG | HEART RATE: 58 BPM | DIASTOLIC BLOOD PRESSURE: 53 MMHG | OXYGEN SATURATION: 98 % | BODY MASS INDEX: 24.6 KG/M2 | WEIGHT: 125.3 LBS

## 2024-10-21 DIAGNOSIS — F17.200 CURRENT SMOKER ON SOME DAYS: ICD-10-CM

## 2024-10-21 DIAGNOSIS — C34.81 SMALL CELL CARCINOMA OF OVERLAPPING SITES OF RIGHT LUNG: Primary | ICD-10-CM

## 2024-10-21 DIAGNOSIS — R59.0 MEDIASTINAL ADENOPATHY: ICD-10-CM

## 2024-10-21 PROCEDURE — G0463 HOSPITAL OUTPT CLINIC VISIT: HCPCS | Performed by: RADIOLOGY

## 2024-10-21 NOTE — TELEPHONE ENCOUNTER
Alka and her daughter, Indy stopped by the office. Alka would like to proceed with chemo. I went over the plan with them. She will need PET, MRI brain, and referral to general surgery for port placement.

## 2024-10-22 ENCOUNTER — TELEPHONE (OUTPATIENT)
Dept: ONCOLOGY | Facility: CLINIC | Age: 89
End: 2024-10-22
Payer: MEDICARE

## 2024-10-22 ENCOUNTER — OFFICE VISIT (OUTPATIENT)
Dept: PULMONOLOGY | Facility: CLINIC | Age: 89
End: 2024-10-22
Payer: MEDICARE

## 2024-10-22 VITALS
HEART RATE: 58 BPM | HEIGHT: 60 IN | DIASTOLIC BLOOD PRESSURE: 72 MMHG | SYSTOLIC BLOOD PRESSURE: 132 MMHG | OXYGEN SATURATION: 96 % | BODY MASS INDEX: 24.15 KG/M2 | WEIGHT: 123 LBS

## 2024-10-22 DIAGNOSIS — J44.9 COPD, MILD: Chronic | ICD-10-CM

## 2024-10-22 DIAGNOSIS — C34.81 SMALL CELL CARCINOMA OF OVERLAPPING SITES OF RIGHT LUNG: Primary | ICD-10-CM

## 2024-10-22 PROCEDURE — 1160F RVW MEDS BY RX/DR IN RCRD: CPT | Performed by: NURSE PRACTITIONER

## 2024-10-22 PROCEDURE — 99213 OFFICE O/P EST LOW 20 MIN: CPT | Performed by: NURSE PRACTITIONER

## 2024-10-22 PROCEDURE — 1159F MED LIST DOCD IN RCRD: CPT | Performed by: NURSE PRACTITIONER

## 2024-10-22 NOTE — TELEPHONE ENCOUNTER
Caller: Mynor Machado    Relationship: Emergency Contact    Best call back number: [phone]    What is the best time to reach you: ANYTIME    Who are you requesting to speak with (clinical staff, provider,  specific staff member): CLINICAL    What was the call regarding: MYNOR WANTS TO KNOW WHEN PATIENT IS DUE FOR HER LAB APPT - PLEASE ADVISE.

## 2024-10-22 NOTE — PROGRESS NOTES
"Chief Complaint  Multiple lung nodules    Subjective    History of Present Illness      Alka Nicolas presents to [facility] for:    History of Present Illness   Follow-up after recently being diagnosed with small cell lung cancer, stage IV.  She underwent Ion bronchoscopy and EBUS with Dr. Johnson 9/30/2024.  She is gotten established with Dr. Caldera and has a pending brain MRI and PET scan.  She was seen by radiation oncology on Monday with no radiation plans at this time.  She is being seen by surgery tomorrow to have a port placed with plans to start chemotherapy.  She reports that her pulmonary status is stable.  She has an albuterol inhaler that she uses on an as-needed basis.  She is accompanied by her daughter today.  She stays up-to-date on vaccines.  Objective   Vital Signs:   /72   Pulse 58   Ht 152.4 cm (60\")   Wt 55.8 kg (123 lb)   SpO2 96% Comment: RA  BMI 24.02 kg/m²     Physical Exam  Vitals reviewed. Exam conducted with a chaperone present.   Constitutional:       General: She is not in acute distress.     Appearance: She is well-developed. She is not ill-appearing or toxic-appearing.   HENT:      Head: Atraumatic.   Eyes:      General: No scleral icterus.  Cardiovascular:      Rate and Rhythm: Normal rate and regular rhythm.      Heart sounds: S1 normal and S2 normal.   Pulmonary:      Effort: Pulmonary effort is normal.      Breath sounds: Normal breath sounds.   Musculoskeletal:         General: No deformity.      Cervical back: Neck supple.   Skin:     General: Skin is warm.   Neurological:      Mental Status: She is alert.   Psychiatric:         Mood and Affect: Mood normal.        Result Review :   The following data was reviewed by: CHRISTIANO Elder on 10/22/2024:  Fine Needle Aspiration (09/30/2024 13:21)   Results for orders placed during the hospital encounter of 12/31/20    Full Pulmonary Function Test " With Bronchodilator & ABG    Narrative  [facility] - Pulmonary Function Test    [address]  [facility]  [id number]  [phone]    Patient : Alka Nicolas  MRN : [id number]  YOB: 1935  :  Lucas Johnson MD  Date : 1/1/2021    ______________________________________________________________________    Interpretation :  1. Spirometry is normal  2. Mid flow is normal  3. Lung volume measurements show reduction in expiratory reserve volume, normal to borderline reduced residual volume and normal total lung capacity.  4. Airways resistance is mildly reduced and conductance is normal      Lucas Johnson MD  01/01/21 12:58 CST               Assessment and Plan      Diagnoses and all orders for this visit:    1. Small cell carcinoma of overlapping sites of right lung (Primary)  Comments:  Starting treatment with plans for chemotherapy.  Will defer decisions to oncology.    2. COPD, mild  Comments:  Stable.  She has albuterol to use as needed.  Follow-up with pulmonology as needed.      Fan Love, CHRISTIANO  10/22/2024  13:22 CDT    Follow Up   Return if symptoms worsen or fail to improve.    Patient was given instructions and counseling regarding her condition or for health maintenance advice. Please see specific information pulled into the AVS if appropriate.

## 2024-10-22 NOTE — TELEPHONE ENCOUNTER
Caller: Hui Machado    Relationship: Emergency Contact    Best call back number: [phone]     Who are you requesting to speak with (clinical staff, provider,  specific staff member): CLINICAL      What was the call regarding: PATIENT DAUGHTER WAS NOTIFIED BY CENTRAL SCHEDULING THAT MRI OF BRAIN WOULD NEED TO BE PLACED AS A STAT ORDER OR IT WILL BE SCHEDULED FOR END OF NOVEMBER.

## 2024-10-23 ENCOUNTER — HOSPITAL ENCOUNTER (OUTPATIENT)
Dept: MRI IMAGING | Facility: HOSPITAL | Age: 89
Discharge: HOME | End: 2024-10-23
Admitting: INTERNAL MEDICINE
Payer: MEDICARE

## 2024-10-23 DIAGNOSIS — C34.81 SMALL CELL CARCINOMA OF OVERLAPPING SITES OF RIGHT LUNG: ICD-10-CM

## 2024-10-23 PROCEDURE — 25510000001 GADOPICLENOL 0.5 MMOL/ML SOLUTION: Performed by: INTERNAL MEDICINE

## 2024-10-23 PROCEDURE — 70553 MRI BRAIN STEM W/O & W/DYE: CPT

## 2024-10-23 PROCEDURE — A9573 GADOPICLENOL 0.5 MMOL/ML SOLUTION: HCPCS | Performed by: INTERNAL MEDICINE

## 2024-10-23 RX ADMIN — GADOPICLENOL 6 ML: 485.1 INJECTION INTRAVENOUS at 10:48

## 2024-10-24 ENCOUNTER — PATIENT ROUNDING (BHMG ONLY) (OUTPATIENT)
Dept: SURGERY | Facility: CLINIC | Age: 89
End: 2024-10-24
Payer: MEDICARE

## 2024-10-24 PROBLEM — Z45.2 ENCOUNTER FOR CARE RELATED TO VASCULAR ACCESS PORT: Status: ACTIVE | Noted: 2024-10-24

## 2024-10-28 ENCOUNTER — HOSPITAL ENCOUNTER (OUTPATIENT)
Dept: CT IMAGING | Facility: HOSPITAL | Age: 89
Discharge: HOME | End: 2024-10-28
Payer: MEDICARE

## 2024-10-28 ENCOUNTER — PRE-ADMISSION TESTING (OUTPATIENT)
Dept: PREADMISSION TESTING | Facility: HOSPITAL | Age: 89
End: 2024-10-28
Payer: MEDICARE

## 2024-10-28 VITALS
WEIGHT: 124.34 LBS | DIASTOLIC BLOOD PRESSURE: 70 MMHG | RESPIRATION RATE: 16 BRPM | SYSTOLIC BLOOD PRESSURE: 174 MMHG | OXYGEN SATURATION: 98 % | BODY MASS INDEX: 25.07 KG/M2 | HEART RATE: 62 BPM | HEIGHT: 59 IN

## 2024-10-28 DIAGNOSIS — Z45.2 ENCOUNTER FOR CARE RELATED TO VASCULAR ACCESS PORT: ICD-10-CM

## 2024-10-28 DIAGNOSIS — C34.81 SMALL CELL CARCINOMA OF OVERLAPPING SITES OF RIGHT LUNG: ICD-10-CM

## 2024-10-28 LAB
ALBUMIN SERPL-MCNC: 4 G/DL (ref 3.5–5.2)
ALBUMIN/GLOB SERPL: 1.5 G/DL
ALP SERPL-CCNC: 188 U/L (ref 39–117)
ALT SERPL W P-5'-P-CCNC: 10 U/L (ref 1–33)
ANION GAP SERPL CALCULATED.3IONS-SCNC: 10 MMOL/L (ref 5–15)
AST SERPL-CCNC: 17 U/L (ref 1–32)
BASOPHILS # BLD AUTO: 0.04 10*3/MM3 (ref 0–0.2)
BASOPHILS NFR BLD AUTO: 0.5 % (ref 0–1.5)
BILIRUB SERPL-MCNC: 0.7 MG/DL (ref 0–1.2)
BUN SERPL-MCNC: 17 MG/DL (ref 8–23)
BUN/CREAT SERPL: 21 (ref 7–25)
CALCIUM SPEC-SCNC: 9 MG/DL (ref 8.6–10.5)
CHLORIDE SERPL-SCNC: 104 MMOL/L (ref 98–107)
CO2 SERPL-SCNC: 24 MMOL/L (ref 22–29)
CREAT SERPL-MCNC: 0.81 MG/DL (ref 0.57–1)
DEPRECATED RDW RBC AUTO: 41.7 FL (ref 37–54)
EGFRCR SERPLBLD CKD-EPI 2021: 69.5 ML/MIN/1.73
EOSINOPHIL # BLD AUTO: 0.27 10*3/MM3 (ref 0–0.4)
EOSINOPHIL NFR BLD AUTO: 3.6 % (ref 0.3–6.2)
ERYTHROCYTE [DISTWIDTH] IN BLOOD BY AUTOMATED COUNT: 13.1 % (ref 12.3–15.4)
GLOBULIN UR ELPH-MCNC: 2.7 GM/DL
GLUCOSE SERPL-MCNC: 95 MG/DL (ref 65–99)
HCT VFR BLD AUTO: 37.4 % (ref 34–46.6)
HGB BLD-MCNC: 12.5 G/DL (ref 12–15.9)
IMM GRANULOCYTES # BLD AUTO: 0.02 10*3/MM3 (ref 0–0.05)
IMM GRANULOCYTES NFR BLD AUTO: 0.3 % (ref 0–0.5)
LYMPHOCYTES # BLD AUTO: 1.25 10*3/MM3 (ref 0.7–3.1)
LYMPHOCYTES NFR BLD AUTO: 16.8 % (ref 19.6–45.3)
MCH RBC QN AUTO: 29.6 PG (ref 26.6–33)
MCHC RBC AUTO-ENTMCNC: 33.4 G/DL (ref 31.5–35.7)
MCV RBC AUTO: 88.4 FL (ref 79–97)
MONOCYTES # BLD AUTO: 0.56 10*3/MM3 (ref 0.1–0.9)
MONOCYTES NFR BLD AUTO: 7.5 % (ref 5–12)
NEUTROPHILS NFR BLD AUTO: 5.28 10*3/MM3 (ref 1.7–7)
NEUTROPHILS NFR BLD AUTO: 71.3 % (ref 42.7–76)
NRBC BLD AUTO-RTO: 0 /100 WBC (ref 0–0.2)
PLATELET # BLD AUTO: 194 10*3/MM3 (ref 140–450)
PMV BLD AUTO: 9.8 FL (ref 6–12)
POTASSIUM SERPL-SCNC: 4.1 MMOL/L (ref 3.5–5.2)
PROT SERPL-MCNC: 6.7 G/DL (ref 6–8.5)
RBC # BLD AUTO: 4.23 10*6/MM3 (ref 3.77–5.28)
SODIUM SERPL-SCNC: 138 MMOL/L (ref 136–145)
WBC NRBC COR # BLD AUTO: 7.42 10*3/MM3 (ref 3.4–10.8)

## 2024-10-28 PROCEDURE — 0 FLUDEOXYGLUCOSE F18 SOLUTION: Performed by: INTERNAL MEDICINE

## 2024-10-28 PROCEDURE — A9552 F18 FDG: HCPCS | Performed by: INTERNAL MEDICINE

## 2024-10-28 PROCEDURE — 78815 PET IMAGE W/CT SKULL-THIGH: CPT

## 2024-10-28 PROCEDURE — 36415 COLL VENOUS BLD VENIPUNCTURE: CPT

## 2024-10-28 PROCEDURE — 85025 COMPLETE CBC W/AUTO DIFF WBC: CPT

## 2024-10-28 PROCEDURE — 80053 COMPREHEN METABOLIC PANEL: CPT

## 2024-10-28 RX ADMIN — FLUDEOXYGLUCOSE F18 1 DOSE: 300 INJECTION INTRAVENOUS at 11:10

## 2024-10-28 NOTE — DISCHARGE INSTRUCTIONS
Preparing for Surgery  Follow these instructions before the procedure:  Several days or weeks before your procedure        Ask your health care provider about:  Changing or stopping your regular medicines. This is especially important if you are taking diabetes medicines or blood thinners.  Taking medicines such as aspirin and ibuprofen. These medicines can thin your blood. Do not take these medicines unless your health care provider tells you to take them.  Taking over-the-counter medicines, vitamins, herbs, and supplements.    Contact your surgeon if you:  Develop a fever of more than 100.4°F (38°C) or other feelings of illness during the 48 hours before your surgery.  Have symptoms that get worse.  Have questions or concerns about your surgery.  If you are going home the same day of your surgery you will need to arrange for a responsible adult, age 18 years old or older, to drive you home from the hospital and stay with you for 24 hours. Verification of the  will be made prior to any procedure requiring sedation. You may not go home in a taxi or any form of public transportation by yourself.     Day before your procedure      24 hours before your procedure DO NOT drink alcoholic beverages or smoke.  24 hours before your procedure STOP taking Erectile Dysfunction medication (i.e.,Cialis, Viagra)   You may be asked to shower with a germ-killing soap.  Day of your procedure   You may take the following medication(s) the morning of surgery with a sip of water: carvedilol      8 hours before your scheduled arrival time, STOP all food, any dairy products, and full liquids. This includes hard candy, chewing gum or mints. This is extremely important to prevent serious complications.     Up to 2 hours before your scheduled arrival time, you may have clear liquids no cream, powder, or pulp of any kind. Safe options are water, black coffee, plain tea, soda, Gatorade/Powerade, clear broth, apple juice.    2 hours  before your scheduled arrival time, STOP drinking clear liquids.    You may need to take another shower with a germ-killing soap before you leave home in the morning. Do not use perfumes, colognes, or body lotions.  Wear comfortable loose-fitting clothing.  Remove all jewelry including body piercing and rings, dark colored nail polish, and make up prior to arrival at the hospital. Leave all valuables at home.   Bring your hearing aids if you rely on them.  Do not wear contact lenses. If you wear eyeglasses remember to bring a case to store them in while you are in surgery.  Do not use denture adhesives since you will be asked to remove them during your surgery.    You do not need to bring your home medications into the hospital.   Bring your sleep apnea device with you on the day of your surgery (if this applies to you).  If you wear portable oxygen, bring it with you.   If you are staying overnight, you may bring a bag of items you may need such as slippers, robe and a change of clothes for your discharge. You may want to leave these items in the car until you are ready for them since your family will take your belongings when you leave the pre-operative area.  Arrive at the hospital as scheduled by the office. You will be asked to arrive 2 hours prior to your surgery time in order to prepare for your procedure.  When you arrive at the hospital  Go to the registration desk located at the main entrance of the hospital.  After registration is completed, you will be given a beeper and a sticker sheet. Take the stickers to Outpatient Surgery and place in the tray at the end of the desk to notify the staff that you have arrived and registered.   Return to the lobby to wait. You are not always called back according to the time of arrival but rather the time your doctor will be ready.  When your beeper lights up and vibrates proceed through the double doors, under the stairs, and a member of the Outpatient Surgery staff  will escort you to your preoperative room.   How to Use Chlorhexidine Before Surgery  Chlorhexidine gluconate (CHG) is a germ-killing (antiseptic) solution that is used to clean the skin. It can get rid of the bacteria that normally live on the skin and can keep them away for about 24 hours. To clean your skin with CHG, you may be given:  A CHG solution to use in the shower or as part of a sponge bath.  A prepackaged cloth that contains CHG.  Cleaning your skin with CHG may help lower the risk for infection:  While you are staying in the intensive care unit of the hospital.  If you have a vascular access, such as a central line, to provide short-term or long-term access to your veins.  If you have a catheter to drain urine from your bladder.  If you are on a ventilator. A ventilator is a machine that helps you breathe by moving air in and out of your lungs.  After surgery.  What are the risks?  Risks of using CHG include:  A skin reaction.  Hearing loss, if CHG gets in your ears and you have a perforated eardrum.  Eye injury, if CHG gets in your eyes and is not rinsed out.  The CHG product catching fire.  Make sure that you avoid smoking and flames after applying CHG to your skin.  Do not use CHG:  If you have a chlorhexidine allergy or have previously reacted to chlorhexidine.  On babies younger than 2 months of age.  How to use CHG solution  Use CHG only as told by your health care provider, and follow the instructions on the label.  Use the full amount of CHG as directed. Usually, this is one bottle.  During a shower    Follow these steps when using CHG solution during a shower (unless your health care provider gives you different instructions):  Start the shower.  Use your normal soap and shampoo to wash your face and hair.  Turn off the shower or move out of the shower stream.  Pour the CHG onto a clean washcloth. Do not use any type of brush or rough-edged sponge.  Starting at your neck, lather your body down  to your toes. Make sure you follow these instructions:  If you will be having surgery, pay special attention to the part of your body where you will be having surgery. Scrub this area for at least 1 minute.  Do not use CHG on your head or face. If the solution gets into your ears or eyes, rinse them well with water.  Avoid your genital area.  Avoid any areas of skin that have broken skin, cuts, or scrapes.  Scrub your back and under your arms. Make sure to wash skin folds.  Let the lather sit on your skin for 1-2 minutes or as long as told by your health care provider.  Thoroughly rinse your entire body in the shower. Make sure that all body creases and crevices are rinsed well.  Dry off with a clean towel. Do not put any substances on your body afterward--such as powder, lotion, or perfume--unless you are told to do so by your health care provider. Only use lotions that are recommended by the .  Put on clean clothes or pajamas.  If it is the night before your surgery, sleep in clean sheets.     During a sponge bath  Follow these steps when using CHG solution during a sponge bath (unless your health care provider gives you different instructions):  Use your normal soap and shampoo to wash your face and hair.  Pour the CHG onto a clean washcloth.  Starting at your neck, lather your body down to your toes. Make sure you follow these instructions:  If you will be having surgery, pay special attention to the part of your body where you will be having surgery. Scrub this area for at least 1 minute.  Do not use CHG on your head or face. If the solution gets into your ears or eyes, rinse them well with water.  Avoid your genital area.  Avoid any areas of skin that have broken skin, cuts, or scrapes.  Scrub your back and under your arms. Make sure to wash skin folds.  Let the lather sit on your skin for 1-2 minutes or as long as told by your health care provider.  Using a different clean, wet washcloth,  thoroughly rinse your entire body. Make sure that all body creases and crevices are rinsed well.  Dry off with a clean towel. Do not put any substances on your body afterward--such as powder, lotion, or perfume--unless you are told to do so by your health care provider. Only use lotions that are recommended by the .  Put on clean clothes or pajamas.  If it is the night before your surgery, sleep in clean sheets.  How to use CHG prepackaged cloths  Only use CHG cloths as told by your health care provider, and follow the instructions on the label.  Use the CHG cloth on clean, dry skin.  Do not use the CHG cloth on your head or face unless your health care provider tells you to.  When washing with the CHG cloth:  Avoid your genital area.  Avoid any areas of skin that have broken skin, cuts, or scrapes.  Before surgery    Follow these steps when using a CHG cloth to clean before surgery (unless your health care provider gives you different instructions):  Using the CHG cloth, vigorously scrub the part of your body where you will be having surgery. Scrub using a back-and-forth motion for 3 minutes. The area on your body should be completely wet with CHG when you are done scrubbing.  Do not rinse. Discard the cloth and let the area air-dry. Do not put any substances on the area afterward, such as powder, lotion, or perfume.  Put on clean clothes or pajamas.  If it is the night before your surgery, sleep in clean sheets.     For general bathing  Follow these steps when using CHG cloths for general bathing (unless your health care provider gives you different instructions).  Use a separate CHG cloth for each area of your body. Make sure you wash between any folds of skin and between your fingers and toes. Wash your body in the following order, switching to a new cloth after each step:  The front of your neck, shoulders, and chest.  Both of your arms, under your arms, and your hands.  Your stomach and groin area,  avoiding the genitals.  Your right leg and foot.  Your left leg and foot.  The back of your neck, your back, and your buttocks.  Do not rinse. Discard the cloth and let the area air-dry. Do not put any substances on your body afterward--such as powder, lotion, or perfume--unless you are told to do so by your health care provider. Only use lotions that are recommended by the .  Put on clean clothes or pajamas.  Contact a health care provider if:  Your skin gets irritated after scrubbing.  You have questions about using your solution or cloth.  You swallow any chlorhexidine. Call your local poison control center (1-251.511.6792 in the [place]  Get help right away if:  Your eyes itch badly, or they become very red or swollen.  Your skin itches badly and is red or swollen.  Your hearing changes.  You have trouble seeing.  You have swelling or tingling in your mouth or throat.  You have trouble breathing.  These symptoms may represent a serious problem that is an emergency. Do not wait to see if the symptoms will go away. Get medical help right away. Call your local emergency services (185 in the [place] Do not drive yourself to the hospital.  Summary  Chlorhexidine gluconate (CHG) is a germ-killing (antiseptic) solution that is used to clean the skin. Cleaning your skin with CHG may help to lower your risk for infection.  You may be given CHG to use for bathing. It may be in a bottle or in a prepackaged cloth to use on your skin. Carefully follow your health care provider's instructions and the instructions on the product label.  Do not use CHG if you have a chlorhexidine allergy.  Contact your health care provider if your skin gets irritated after scrubbing.  This information is not intended to replace advice given to you by your health care provider. Make sure you discuss any questions you have with your health care provider.  Document Revised: 04/17/2023 Document Reviewed: 02/28/2022  Elsevier Patient  Education © 2023 [employer]

## 2024-10-29 ENCOUNTER — ANESTHESIA EVENT (OUTPATIENT)
Dept: PERIOP | Facility: HOSPITAL | Age: 89
End: 2024-10-29
Payer: MEDICARE

## 2024-10-29 ENCOUNTER — APPOINTMENT (OUTPATIENT)
Dept: GENERAL RADIOLOGY | Facility: HOSPITAL | Age: 89
End: 2024-10-29
Payer: MEDICARE

## 2024-10-29 ENCOUNTER — HOSPITAL ENCOUNTER (OUTPATIENT)
Facility: HOSPITAL | Age: 89
Setting detail: HOSPITAL OUTPATIENT SURGERY
Discharge: HOME | End: 2024-10-29
Attending: STUDENT IN AN ORGANIZED HEALTH CARE EDUCATION/TRAINING PROGRAM | Admitting: STUDENT IN AN ORGANIZED HEALTH CARE EDUCATION/TRAINING PROGRAM
Payer: MEDICARE

## 2024-10-29 ENCOUNTER — ANESTHESIA (OUTPATIENT)
Dept: PERIOP | Facility: HOSPITAL | Age: 89
End: 2024-10-29
Payer: MEDICARE

## 2024-10-29 VITALS
DIASTOLIC BLOOD PRESSURE: 55 MMHG | HEART RATE: 57 BPM | RESPIRATION RATE: 20 BRPM | SYSTOLIC BLOOD PRESSURE: 152 MMHG | OXYGEN SATURATION: 98 % | TEMPERATURE: 98 F

## 2024-10-29 DIAGNOSIS — Z45.2 ENCOUNTER FOR CARE RELATED TO VASCULAR ACCESS PORT: ICD-10-CM

## 2024-10-29 PROCEDURE — 77001 FLUOROGUIDE FOR VEIN DEVICE: CPT

## 2024-10-29 PROCEDURE — S0260 H&P FOR SURGERY: HCPCS | Performed by: STUDENT IN AN ORGANIZED HEALTH CARE EDUCATION/TRAINING PROGRAM

## 2024-10-29 PROCEDURE — 25810000003 LACTATED RINGERS PER 1000 ML: Performed by: STUDENT IN AN ORGANIZED HEALTH CARE EDUCATION/TRAINING PROGRAM

## 2024-10-29 PROCEDURE — 25010000002 CEFAZOLIN PER 500 MG

## 2024-10-29 PROCEDURE — 25010000002 PROPOFOL 1000 MG/100ML EMULSION

## 2024-10-29 PROCEDURE — C1788 PORT, INDWELLING, IMP: HCPCS | Performed by: STUDENT IN AN ORGANIZED HEALTH CARE EDUCATION/TRAINING PROGRAM

## 2024-10-29 PROCEDURE — 25010000002 LIDOCAINE 1% - EPINEPHRINE 1:100000 1 %-1:100000 SOLUTION: Performed by: STUDENT IN AN ORGANIZED HEALTH CARE EDUCATION/TRAINING PROGRAM

## 2024-10-29 PROCEDURE — 25010000002 LIDOCAINE PF 2% 2 % SOLUTION

## 2024-10-29 PROCEDURE — 71045 X-RAY EXAM CHEST 1 VIEW: CPT

## 2024-10-29 PROCEDURE — 36561 INSERT TUNNELED CV CATH: CPT | Performed by: STUDENT IN AN ORGANIZED HEALTH CARE EDUCATION/TRAINING PROGRAM

## 2024-10-29 PROCEDURE — 77001 FLUOROGUIDE FOR VEIN DEVICE: CPT | Performed by: STUDENT IN AN ORGANIZED HEALTH CARE EDUCATION/TRAINING PROGRAM

## 2024-10-29 PROCEDURE — 25010000002 HEPARIN LOCK FLUSH PER 10 UNITS: Performed by: STUDENT IN AN ORGANIZED HEALTH CARE EDUCATION/TRAINING PROGRAM

## 2024-10-29 PROCEDURE — 25810000003 SODIUM CHLORIDE PER 500 ML: Performed by: STUDENT IN AN ORGANIZED HEALTH CARE EDUCATION/TRAINING PROGRAM

## 2024-10-29 PROCEDURE — 76000 FLUOROSCOPY <1 HR PHYS/QHP: CPT

## 2024-10-29 DEVICE — PRT INTRO VASC/INTERV VORTEX FILL/HL DETACH/POLYURET/CATH 8F: Type: IMPLANTABLE DEVICE | Site: CHEST | Status: FUNCTIONAL

## 2024-10-29 RX ORDER — LABETALOL HYDROCHLORIDE 5 MG/ML
5 INJECTION, SOLUTION INTRAVENOUS
Status: DISCONTINUED | OUTPATIENT
Start: 2024-10-29 | End: 2024-10-29 | Stop reason: HOSPADM

## 2024-10-29 RX ORDER — SODIUM CHLORIDE 9 MG/ML
INJECTION, SOLUTION INTRAVENOUS AS NEEDED
Status: DISCONTINUED | OUTPATIENT
Start: 2024-10-29 | End: 2024-10-29 | Stop reason: HOSPADM

## 2024-10-29 RX ORDER — HYDROCODONE BITARTRATE AND ACETAMINOPHEN 5; 325 MG/1; MG/1
1 TABLET ORAL ONCE AS NEEDED
Status: DISCONTINUED | OUTPATIENT
Start: 2024-10-29 | End: 2024-10-29 | Stop reason: HOSPADM

## 2024-10-29 RX ORDER — EPHEDRINE SULFATE 50 MG/ML
INJECTION INTRAVENOUS AS NEEDED
Status: DISCONTINUED | OUTPATIENT
Start: 2024-10-29 | End: 2024-10-29 | Stop reason: SURG

## 2024-10-29 RX ORDER — HEPARIN SODIUM (PORCINE) LOCK FLUSH IV SOLN 100 UNIT/ML 100 UNIT/ML
SOLUTION INTRAVENOUS AS NEEDED
Status: DISCONTINUED | OUTPATIENT
Start: 2024-10-29 | End: 2024-10-29 | Stop reason: HOSPADM

## 2024-10-29 RX ORDER — NALOXONE HCL 0.4 MG/ML
0.4 VIAL (ML) INJECTION AS NEEDED
Status: DISCONTINUED | OUTPATIENT
Start: 2024-10-29 | End: 2024-10-29 | Stop reason: HOSPADM

## 2024-10-29 RX ORDER — PROPOFOL 10 MG/ML
INJECTION, EMULSION INTRAVENOUS CONTINUOUS PRN
Status: DISCONTINUED | OUTPATIENT
Start: 2024-10-29 | End: 2024-10-29 | Stop reason: SURG

## 2024-10-29 RX ORDER — DEXTROSE MONOHYDRATE 25 G/50ML
12.5 INJECTION, SOLUTION INTRAVENOUS AS NEEDED
Status: DISCONTINUED | OUTPATIENT
Start: 2024-10-29 | End: 2024-10-29 | Stop reason: HOSPADM

## 2024-10-29 RX ORDER — FENTANYL CITRATE 50 UG/ML
25 INJECTION, SOLUTION INTRAMUSCULAR; INTRAVENOUS
Status: DISCONTINUED | OUTPATIENT
Start: 2024-10-29 | End: 2024-10-29 | Stop reason: HOSPADM

## 2024-10-29 RX ORDER — HYDROCODONE BITARTRATE AND ACETAMINOPHEN 5; 325 MG/1; MG/1
1 TABLET ORAL EVERY 4 HOURS PRN
Status: DISCONTINUED | OUTPATIENT
Start: 2024-10-29 | End: 2024-10-29 | Stop reason: HOSPADM

## 2024-10-29 RX ORDER — LIDOCAINE HYDROCHLORIDE 20 MG/ML
INJECTION, SOLUTION EPIDURAL; INFILTRATION; INTRACAUDAL; PERINEURAL AS NEEDED
Status: DISCONTINUED | OUTPATIENT
Start: 2024-10-29 | End: 2024-10-29 | Stop reason: SURG

## 2024-10-29 RX ORDER — SODIUM CHLORIDE 0.9 % (FLUSH) 0.9 %
3 SYRINGE (ML) INJECTION AS NEEDED
Status: DISCONTINUED | OUTPATIENT
Start: 2024-10-29 | End: 2024-10-29 | Stop reason: HOSPADM

## 2024-10-29 RX ORDER — LIDOCAINE HYDROCHLORIDE AND EPINEPHRINE 10; 10 MG/ML; UG/ML
INJECTION, SOLUTION INFILTRATION; PERINEURAL AS NEEDED
Status: DISCONTINUED | OUTPATIENT
Start: 2024-10-29 | End: 2024-10-29 | Stop reason: HOSPADM

## 2024-10-29 RX ORDER — HYDROCODONE BITARTRATE AND ACETAMINOPHEN 10; 325 MG/1; MG/1
1 TABLET ORAL EVERY 4 HOURS PRN
Status: DISCONTINUED | OUTPATIENT
Start: 2024-10-29 | End: 2024-10-29 | Stop reason: HOSPADM

## 2024-10-29 RX ORDER — FENTANYL CITRATE 50 UG/ML
50 INJECTION, SOLUTION INTRAMUSCULAR; INTRAVENOUS
Status: DISCONTINUED | OUTPATIENT
Start: 2024-10-29 | End: 2024-10-29 | Stop reason: HOSPADM

## 2024-10-29 RX ORDER — ONDANSETRON 2 MG/ML
4 INJECTION INTRAMUSCULAR; INTRAVENOUS ONCE AS NEEDED
Status: DISCONTINUED | OUTPATIENT
Start: 2024-10-29 | End: 2024-10-29 | Stop reason: HOSPADM

## 2024-10-29 RX ORDER — DROPERIDOL 2.5 MG/ML
0.62 INJECTION, SOLUTION INTRAMUSCULAR; INTRAVENOUS ONCE AS NEEDED
Status: DISCONTINUED | OUTPATIENT
Start: 2024-10-29 | End: 2024-10-29 | Stop reason: HOSPADM

## 2024-10-29 RX ORDER — SODIUM CHLORIDE, SODIUM LACTATE, POTASSIUM CHLORIDE, CALCIUM CHLORIDE 600; 310; 30; 20 MG/100ML; MG/100ML; MG/100ML; MG/100ML
1000 INJECTION, SOLUTION INTRAVENOUS CONTINUOUS
Status: DISCONTINUED | OUTPATIENT
Start: 2024-10-29 | End: 2024-10-29 | Stop reason: HOSPADM

## 2024-10-29 RX ORDER — FLUMAZENIL 0.1 MG/ML
0.2 INJECTION INTRAVENOUS AS NEEDED
Status: DISCONTINUED | OUTPATIENT
Start: 2024-10-29 | End: 2024-10-29 | Stop reason: HOSPADM

## 2024-10-29 RX ORDER — IBUPROFEN 600 MG/1
600 TABLET, FILM COATED ORAL EVERY 6 HOURS PRN
Status: DISCONTINUED | OUTPATIENT
Start: 2024-10-29 | End: 2024-10-29 | Stop reason: HOSPADM

## 2024-10-29 RX ORDER — LIDOCAINE HYDROCHLORIDE 10 MG/ML
0.5 INJECTION, SOLUTION EPIDURAL; INFILTRATION; INTRACAUDAL; PERINEURAL ONCE AS NEEDED
Status: DISCONTINUED | OUTPATIENT
Start: 2024-10-29 | End: 2024-10-29 | Stop reason: HOSPADM

## 2024-10-29 RX ORDER — SODIUM CHLORIDE 0.9 % (FLUSH) 0.9 %
10 SYRINGE (ML) INJECTION AS NEEDED
Status: DISCONTINUED | OUTPATIENT
Start: 2024-10-29 | End: 2024-10-29 | Stop reason: HOSPADM

## 2024-10-29 RX ORDER — SODIUM CHLORIDE 0.9 % (FLUSH) 0.9 %
10 SYRINGE (ML) INJECTION EVERY 12 HOURS SCHEDULED
Status: DISCONTINUED | OUTPATIENT
Start: 2024-10-29 | End: 2024-10-29 | Stop reason: HOSPADM

## 2024-10-29 RX ADMIN — PROPOFOL INJECTABLE EMULSION 20 MG: 10 INJECTION, EMULSION INTRAVENOUS at 13:13

## 2024-10-29 RX ADMIN — EPHEDRINE SULFATE 15 MG: 50 INJECTION INTRAVENOUS at 13:31

## 2024-10-29 RX ADMIN — SODIUM CHLORIDE, POTASSIUM CHLORIDE, SODIUM LACTATE AND CALCIUM CHLORIDE 1000 ML: 600; 310; 30; 20 INJECTION, SOLUTION INTRAVENOUS at 11:01

## 2024-10-29 RX ADMIN — PROPOFOL INJECTABLE EMULSION 100 MCG/KG/MIN: 10 INJECTION, EMULSION INTRAVENOUS at 13:09

## 2024-10-29 RX ADMIN — LIDOCAINE HYDROCHLORIDE 50 MG: 20 INJECTION, SOLUTION EPIDURAL; INFILTRATION; INTRACAUDAL; PERINEURAL at 13:09

## 2024-10-29 RX ADMIN — PROPOFOL INJECTABLE EMULSION 50 MG: 10 INJECTION, EMULSION INTRAVENOUS at 13:10

## 2024-10-29 RX ADMIN — CEFAZOLIN 2000 MG: 2 INJECTION, POWDER, FOR SOLUTION INTRAMUSCULAR; INTRAVENOUS at 13:14

## 2024-10-29 RX ADMIN — PROPOFOL INJECTABLE EMULSION 30 MG: 10 INJECTION, EMULSION INTRAVENOUS at 13:12

## 2024-10-29 NOTE — OP NOTE
GENERAL SURGERY OPERATIVE NOTE    Operative Date: 10/29/24    Operating Room Number: 15    Patient name: Alka Nicolas    Preoperative diagnosis: need for port placement    Postoperative diagnosis: same    Procedure performed: left subclavian single lumen port placement    Surgeon: Sean Ibrahim MD    Assistant: Sean Ibrahim MD     Anesthesia: MAC    Indications: This is a 89 y.o. female presenting with metastatic lung cancer necessitating treatment with chemotherapy requiring port placement.  We have recommended subclavian port placement in the operating room and the patient is agreeable and elects to proceed.       Findings:   Placement of left subclavian port, fluoroscopy demonstrating catheter tip at the atriocaval junction       Procedure: The patient was seen and assessed in the preoperative holding area. Signed consents were obtained after a full discussion of the risks, benefits, and alternative therapies. The patient was then transported to the operating theatre, transferred to the operating table under her own power, monitored anesthesia was induced, and supplemental oxygen was administered via Ventimask. A timeout was performed, the patient's identity and the proposed procedure were confirmed; all present agreed and elected to proceed.    Patient's chest and shoulders were prepped and draped in the usual sterile fashion using chlorhexidine.  Will subclavian vein was accessed with 18-gauge finder needle, wire was passed into the SVC and right atrium, placement was confirmed with fluoroscopy.  The introducer was placed over the wire in a Seldinger fashion.  A subcutaneous pocket was developed inferior to this access site, and ensure that he could fit the port.  The wire was removed, and used to measure the catheter; catheter was then attached to the port, and the tunneling device was used to pass it from the port site to the access site, it was then fed through the introducer in the  typical fashion.  The port was secured within subcutaneous pocket with a pair of 2-0 Prolene sutures.  Was then flushed with saline and flushed appropriately.  Soft tissues of the port site were closed with 3-0 Vicryl suture in a running fashion.  A deep dermal layer was approximated with a series of 3-0 Vicryl sutures in an interrupted fashion.  The skin at both sites closed with 4-0 Monocryl suture in a running subcuticular fashion.  The wounds were dressed with skin glue.  Postop fluoroscopy indicated the catheter was in appropriate location with no kinking.  The catheter was then instilled with heparin per protocol.  No issues noted with flushing or access.    At this point, the procedure was concluded, the patient was allowed to emerge from anesthesia and was transported to PACU in stable condition.    I was present for the entirety of the procedure.    Complications: none    EBL: minimal    Specimens: none    Drains: none    Implants: single lumen port    Dispo: PACU - anticipate discharge home following recovery from anesthesia pending post-op CXR             Sean Ibrahim MD 10/29/2024 14:25 CDT

## 2024-10-29 NOTE — ANESTHESIA POSTPROCEDURE EVALUATION
Patient: Alka Nicolas    Procedure Summary       Date: 10/29/24 Room / Location:  [facility] OR  /  [facility]    Anesthesia Start: 1306 Anesthesia Stop: 1416    Procedure: Single Lumen Port-a-cath insertion with flouroscopy (Chin to Nipples) Diagnosis:       Encounter for care related to vascular access port      (Encounter for care related to vascular access port [Z45.2])    Surgeons: Sean Ibrahim MD Provider: Scarlet Barber CRNA    Anesthesia Type: MAC ASA Status: 3            Anesthesia Type: MAC    Vitals  Vitals Value Taken Time   /55 10/29/24 1446   Temp 98 °F (36.7 °C) 10/29/24 1410   Pulse 57 10/29/24 1449   Resp 20 10/29/24 1430   SpO2 97 % 10/29/24 1449   Vitals shown include unfiled device data.        Post Anesthesia Care and Evaluation    Patient location during evaluation: PHASE II  Patient participation: complete - patient participated  Level of consciousness: awake and alert  Pain management: adequate    Airway patency: patent  Anesthetic complications: No anesthetic complications  PONV Status: none  Cardiovascular status: acceptable  Respiratory status: acceptable  Hydration status: acceptable    Comments: Pt discharged from PACU based on howard score >8       No anesthesia care post op

## 2024-10-29 NOTE — ANESTHESIA PREPROCEDURE EVALUATION
Anesthesia Evaluation     Patient summary reviewed   no history of anesthetic complications:   NPO Solid Status: > 8 hours             Airway   Mallampati: II  Dental    (+) upper dentures and lower dentures    Pulmonary    (+) a smoker, COPD,  (-) asthma, sleep apnea  Cardiovascular   Exercise tolerance: good (4-7 METS)    (+) hypertension, CAD, cardiac stents (2008) , dysrhythmias Paroxysmal Atrial Fib, hyperlipidemia  (-) pacemaker, past MI, angina      Neuro/Psych  (-) seizures, TIA, CVA  GI/Hepatic/Renal/Endo    (+) thyroid problem   (-) GERD, liver disease, no renal disease, diabetes    Musculoskeletal     Abdominal    Substance History      OB/GYN          Other                    Anesthesia Plan    ASA 3     MAC     intravenous induction     Anesthetic plan, risks, benefits, and alternatives have been provided, discussed and informed consent has been obtained with: patient.    CODE STATUS:

## 2024-10-29 NOTE — BRIEF OP NOTE
INSERTION VENOUS ACCESS DEVICE  Progress Note    Alka Nicolas  10/29/2024    Pre-op Diagnosis:   Encounter for care related to vascular access port [Z45.2]       Post-Op Diagnosis Codes:     * Encounter for care related to vascular access port [Z45.2]    Procedure/CPT® Codes:        Procedure(s):  Single Lumen Port-a-cath insertion with flouroscopy      Surgeon(s):  Sean Ibrahim MD    Anesthesia: Monitored Anesthesia Care    Staff:   Circulator: Pratima Ochoa RN  Scrub Person: Makenna Reddy; Sil Argueta         Estimated Blood Loss: minimal    Urine Voided: * No values recorded between 10/29/2024  1:07 PM and 10/29/2024  2:05 PM *    Specimens:                None          Drains: * No LDAs found *    Findings: Placement of left subclavian port, fluoroscopy demonstrating catheter tip at the atriocaval junction        Complications: None immediately      Sean Ibrahim MD     Date: 10/29/2024  Time: 14:08 CDT

## 2024-10-29 NOTE — DISCHARGE INSTRUCTIONS
[facility] General Surgery Discharge Instructions  Patient Name: Alka Nicolas  MRN: [id number]  YOB: 1935  Admit Date: 10/29/24      Admitting Diagnosis   Need for port placement      Procedures/Surgery:   10/29/24: Left subclavian single-lumen port placement      Home Medications:    Pain medication:   - Take 1000mg of tylenol every 8 hours for 3 days. After three days, take it only as needed.  - Take 200mg of ibuprofen (motrin) every 8 hours for 3 days. After three days, take it only as needed.       Wound Care/Drains   - you have surgical adhesive covering your incisions; leave intact and do not peel it off. This will fall off on its own anywhere from 10days to 4 weeks.    - No swimming/soaking/bathing/wading or wearing waders for 2 weeks to allow incisions to heal.   - You may shower with surgical adhesive in place 48 hours after surgery.  Allow warm soapy water to run over the incisions, rinse thoroughly, do not scrub, and pat dry gently.    Activity     :   - You may slowly increase your activity after surgery beginning with walking the day of surgery, and increasing the distance slowly.  - No heavy lifting-this means no more than 15 pounds with your left arm for at least 2 weeks.  No vigorous activity, or impact exercises for at least 2 weeks..  Think about the pressure your abdomen feels if you hold a gallon of milk at arms length-you should feel no more pressure than this from anything you lift - if you do it is too heavy  - Do not drive, use power tools, or operate machinery (including lawn equipment) while taking pain medication    Follow-up Visits     :   -You may restart your blood thinner (Xarelto/rivaroxaban) in 2 days-on 10/31/2024.  - Schedule an appointment to see Dr. Ibrahim in his clinic in 2 weeks.  - If you have any concerns before then, call my office at [phone]   -S artie you have concerns about bleeding, a fever greater than 102F, lightheadedness,  dizziness, chest pain, shortness of breath, you should call my office as above or proceed to an emergency department closest to you for evaluation.      Sean Ibrahim MD   10/29/24

## 2024-10-29 NOTE — H&P
[facility] General Surgery Consult History and Physical  Alka Nicolas  MRN: [id number]  Age: 89 y.o. female   YOB: 1935  Admit Date: 10/29/2024   Admitting Physician: Sean Ibrahim MD      Procedure Planned:   Single lumen port placement    SUBJECTIVE  History of Presenting Illness   Patient is a 89 y.o. female with right-sided lung cancer, who is in need for placement to facilitate chemotherapy.  Currently does not have any O2 requirements, she has held her Eliquis since Friday, has never had a port or chest surgery, does have old stents in the heart but no loop recorder or defibrillator.  She had no changes in her condition since previously seen in our clinic.    Past Medical History     Past Medical History:  Past Medical History:   Diagnosis Date    Abdominal aortic aneurysm     Angina pectoris     Atherosclerosis of native coronary artery of native heart without angina pectoris     CAD (coronary artery disease)     Esophageal reflux     GERD (gastroesophageal reflux disease)     History of PTCA     Hyperlipidemia     Hypertension     Hypertension, essential, benign     Lung cancer     Macular degeneration disease     Thyroid disease     Tobacco use disorder        PCP: Roe Gardner MD    Past Surgical History:  Past Surgical History:   Procedure Laterality Date    APPENDECTOMY      BRONCHOSCOPY N/A 9/30/2024    Procedure: BRONCHOSCOPY WITH ENDOBRONCHIAL ULTRASOUND;  Surgeon: Lucas Johnson MD;  Location:  [facility] OR;  Service: Pulmonary;  Laterality: N/A;  preop; lung nodule   postop lung nodules   UMAIR Gardner    BRONCHOSCOPY WITH ION ROBOTIC ASSIST N/A 9/30/2024    Procedure: BRONCHOSCOPY WITH ION ROBOT and BRONCHOSCOPY WITH ENDOBRONCHIAL ULTRASOUND;  Surgeon: Lucas Johnson MD;  Location:  [facility] OR;  Service: Robotics - Pulmonary;  Laterality: N/A;  preop; lung nodules  postop lung nodules   UMAIR Gardner    CARDIAC CATHETERIZATION      CARDIAC  CATHETERIZATION Left 10/21/2020    Procedure: Cardiac Catheterization/Vascular Study;  Surgeon: Marcos Ratliff MD;  Location:  [facility] CATH INVASIVE LOCATION;  Service: Cardiology;  Laterality: Left;    CHOLECYSTECTOMY      HYSTERECTOMY      total       Family History:  Family History   Problem Relation Age of Onset    Diabetes Mother     Heart disease Father     Hyperlipidemia Father     Hypertension Father     Stroke Father     Breast cancer Neg Hx        Social History:  Social History     Tobacco Use    Smoking status: Former     Current packs/day: 0.00     Types: Cigarettes     Quit date: 2020     Years since quittin.5    Smokeless tobacco: Never   Substance Use Topics    Alcohol use: No       Allergies:  No Known Allergies    Medications:  Medications Prior to Admission   Medication Sig Dispense Refill Last Dose/Taking    albuterol sulfate  (90 Base) MCG/ACT inhaler Inhale 2 puffs 4 (Four) Times a Day. 6.7 g 5 10/29/2024 at  8:00 AM    amLODIPine (NORVASC) 5 MG tablet TAKE ONE TABLET BY MOUH DAILY 90 tablet 1 10/28/2024 at  8:00 AM    amoxicillin (AMOXIL) 250 MG capsule Take 1 capsule by mouth Daily.   10/29/2024 at  8:00 AM    atorvastatin (LIPITOR) 40 MG tablet TAKE ONE TABLET BY MOUTH EVERY NIGHT. 90 tablet 4 10/28/2024 at  7:00 PM    carvedilol (COREG) 12.5 MG tablet Take 1 tablet by mouth 2 (Two) Times a Day. 60 tablet 11 10/29/2024 at  8:00 AM    levothyroxine (SYNTHROID, LEVOTHROID) 100 MCG tablet Take 1 tablet by mouth Daily. 90 tablet 0 10/29/2024 at  8:00 AM    Multiple Vitamins-Minerals (PRESERVISION AREDS 2+MULTI VIT PO) Take 1 capsule by mouth Daily.   10/28/2024 at  8:00 AM    potassium chloride (K-DUR,KLOR-CON) 10 MEQ CR tablet Take 1 tablet by mouth Daily.   10/28/2024 at  8:00 AM    apixaban (ELIQUIS) 2.5 MG tablet tablet Take 1 tablet by mouth Every 12 (Twelve) Hours. Indications: Atrial Fibrillation 60 tablet 0 10/26/2024    cloNIDine (CATAPRES) 0.1 MG tablet Take 1  "tablet by mouth Daily As Needed. For blood pressure > 180/90   Unknown    HYDROcodone-acetaminophen (NORCO) 5-325 MG per tablet Take 1 tablet by mouth Every 6 (Six) Hours As Needed for Moderate Pain. 60 tablet 0 Unknown    Hydrocortisone, Perianal, (ANUSOL-HC) 2.5 % rectal cream Insert  into the rectum As Needed.       ondansetron (ZOFRAN) 8 MG tablet Take 1 tablet by mouth Every 8 (Eight) Hours As Needed for Nausea or Vomiting. (Patient not taking: Reported on 10/29/2024) 30 tablet 3 Not Taking         Review of Systems   Review of Systems - General ROS: negative  ENT ROS: negative  Respiratory ROS: positive for - lung cancer  Cardiovascular ROS: no chest pain or dyspnea on exertion  Gastrointestinal ROS: no abdominal pain, change in bowel habits, or black or bloody stools  Genito-Urinary ROS: no dysuria, trouble voiding, or hematuria  Dermatological ROS: negative   Breast ROS: negative for breast lumps  Hematological and Lymphatic ROS: negative  Musculoskeletal ROS: negative   Neurological ROS: no TIA or stroke symptoms    Psychological ROS: negative  Endocrine ROS: negative       Physical Examination     Vitals:    10/29/24 1025   BP: 143/77   BP Location: Left arm   Patient Position: Sitting   Pulse: 60   Resp: 18   Temp: 97.4 °F (36.3 °C)   TempSrc: Temporal   SpO2: 97%     Temp (24hrs), Av.4 °F (36.3 °C), Min:97.4 °F (36.3 °C), Max:97.4 °F (36.3 °C)    Systolic (36hrs), Av , Min:143 , Max:174    Diastolic (36hrs), Av, Min:70, Max:77    Estimated body mass index is 25.07 kg/m² as calculated from the following:    Height as of 10/28/24: 150 cm (59.06\").    Weight as of 10/28/24: 56.4 kg (124 lb 5.4 oz).  PREVIOUS WEIGHTS:   Wt Readings from Last 5 Encounters:   10/28/24 56.4 kg (124 lb 5.4 oz)   10/24/24 56.6 kg (124 lb 12.8 oz)   10/22/24 55.8 kg (123 lb)   10/21/24 56.8 kg (125 lb 4.8 oz)   10/14/24 56.1 kg (123 lb 11.2 oz)       Physical Examination:  Gen: awake, alert, laying on gurney in no " "acute distress  HEENT: NCAT, EOMI, anicteric sclerae  CV: RRR, normotensive  Resp: nonlabored on room air, sats appropriate  Chest: No apparent scars, lesions, landmarks are easily palpable  MSK: moves all four, no c/c/e  Neuro: no focal deficits, cranial nerves grossly intact  Psy:  appropriate, cooperative      Data Review   Labs:  CBC  Results from last 7 days   Lab Units 10/28/24  1015   WBC 10*3/mm3 7.42   HEMOGLOBIN g/dL 12.5   HEMATOCRIT % 37.4     BMP  Results from last 7 days   Lab Units 10/28/24  1015   SODIUM mmol/L 138   CHLORIDE mmol/L 104   CO2 mmol/L 24.0   BUN mg/dL 17   CREATININE mg/dL 0.81   GLUCOSE mg/dL 95   CALCIUM mg/dL 9.0     LFTs  Results from last 7 days   Lab Units 10/28/24  1015   ALT (SGPT) U/L 10   AST (SGOT) U/L 17   ALK PHOS U/L 188*     Coag        Invalid input(s): \"PT\"  Cardiac markers      Iron Studies        Invalid input(s): \"RETICULOCYTE\"    Urine:  UA  Results from last 7 days   Lab Units 10/28/24  1015   EGFR mL/min/1.73 69.5     Radiology Impressions:  NM PET/CT Skull Base to Mid Thigh    Result Date: 10/28/2024   1.  Enlarged hypermetabolic 3.9 cm subpleural right upper lobe lung mass. Enlarging 1.5 cm hypermetabolic right upper lobe pulmonary nodule. Both of these are likely related to primary lung neoplasm.  2.  New and enlarging right hilar, right mediastinal, and right supraclavicular hypermetabolic lymph nodes, in keeping with berta spread of neoplasm.  3.  Enlarging hypermetabolic expansile lytic lesion involving the left posterior third rib. This is compatible with osseous metastasis.  4.  1.5 cm hypermetabolic right adrenal gland nodule, most likely representing metastasis.  5.  2.3 cm hypermetabolic mass in the left paracolic gutter abutting the distal descending colon. Suspect this represents a soft tissue metastatic implant although given abutment of the colon, a second neoplasm related the colon is also possible.  This report was signed and finalized on " 10/28/2024 1:40 PM by Dr. Bhavik Ratliff MD.      MRI Brain With & Without Contrast    Result Date: 10/23/2024  1. No evidence of intracranial metastasis. No abnormal intracranial enhancement. No acute signs of ischemia, hemorrhage, or mass. 2. Nonenhancing hyperintense T2 FLAIR signal changes favoring chronic small vessel ischemia. 3. Small right greater than left mastoid effusions.   This report was signed and finalized on 10/23/2024 11:30 AM by Dr. Ping Stoner MD.      XR Chest 1 View    Result Date: 9/30/2024  Status post right sided bronchoscopy and biopsy without evidence of a pneumothorax.  This report was signed and finalized on 9/30/2024 2:23 PM by Dr. John Adames MD.         Hospital Problem List     Active Hospital Problems    Diagnosis     **Encounter for care related to vascular access port          Assessment/Plan   Alka Nicolas is a 89 y.o. female with right-sided lung cancer in need of a port to facilitate chemotherapy.    -To the OR for port placement  -Site marking not indicated  -Consent signed     Sean Ibrahim MD   10/29/2024 11:25 CDT

## 2024-10-30 ENCOUNTER — TELEPHONE (OUTPATIENT)
Dept: ONCOLOGY | Facility: CLINIC | Age: 89
End: 2024-10-30

## 2024-10-30 ENCOUNTER — TELEPHONE (OUTPATIENT)
Dept: SURGERY | Facility: CLINIC | Age: 89
End: 2024-10-30
Payer: MEDICARE

## 2024-10-30 NOTE — TELEPHONE ENCOUNTER
Post OP phone call visit:    Type of surgery\: Single lumen port-a-cath insertion.   How are you feeling? Doing well.   Are you having any pain or Nausea? No pain. No nausea.   Any drainage or fever? No redness. No drainage. No fever.     We would like to see you back per your schedule.     When you are in need of a refill, please call your pharmacy and they will send us a request.     If you have any questions please call [phone]    Other instructions:      1.  Decrease the xeloda to 2 tablets twice daily days 1-14.  2.  Start Lisinopril 10mg daily.  This was sent to your pharmacy.

## 2024-10-30 NOTE — TELEPHONE ENCOUNTER
Caller: Hui Machado    Relationship: Emergency Contact    Best call back number: [phone]      What was the call regarding: PATIENTS DAUGHTER CALLED TO SEE IF PATIENTS TREATMENT PLAN WAS BENG SCHEDULED

## 2024-10-31 NOTE — TELEPHONE ENCOUNTER
Left message for Hui to call back. Need to let them know that we will get Alka scheduled for education and chemo for next week. I sent a message to front staff to schedule education and they will let me know when that will be then I will get the chemo scheduled.

## 2024-11-04 ENCOUNTER — CLINICAL SUPPORT (OUTPATIENT)
Dept: ONCOLOGY | Facility: CLINIC | Age: 89
End: 2024-11-04
Payer: MEDICARE

## 2024-11-04 DIAGNOSIS — C34.81 SMALL CELL CARCINOMA OF OVERLAPPING SITES OF RIGHT LUNG: Primary | ICD-10-CM

## 2024-11-04 RX ORDER — LIDOCAINE/PRILOCAINE 2.5 %-2.5%
CREAM (GRAM) TOPICAL
Qty: 1 EACH | Refills: 1 | Status: SHIPPED | OUTPATIENT
Start: 2024-11-04

## 2024-11-04 NOTE — PROGRESS NOTES
Subjective     PATIENT NAME:  Alka Nicolas  YOB: 1935  PATIENTS AGE:  89 y.o.  PATIENTS SEX:  female  DATE OF SERVICE:  11/04/2024  PROVIDER:  [facility] NURSE      ____________________PATIENT EDUCATION____________________    PATIENT EDUCATION:  Today I met with the patient Alka Nicolas, , daughters x 2 to discuss the chemotherapy regimen  Carboplatin, -16, Tecentriq, Neulasta, recommended for treatment of her disease.  The patient was given explanation of treatment premed side effects including office policy that prohibits patients to drive if sedating medications are administered, MD explanation given regarding benefits, side effects, toxicities and goals of treatment.  The patient received a Chemotherapy/Biotherapy Plan Summary including diagnosis and specific treatment plan.    SIDE EFFECTS:  Common side effects were discussed with the patient and/or significant other.  Discussion included hair loss/discoloration, anemia/fatigue, infection/chills/fever, appetite, bleeding risk/precautions, constipation, diarrhea, mouth sores, taste alteration, loss of appetite,nausea/vomiting, peripheral neuropathy, skin/nail changes, rash, muscle aches/weakness, photosensitivity, weight gain/loss, hearing loss, dizziness, menopausal symptoms, menstrual irregularity, sterility, high blood pressure, heart damage, liver damage, lung damage, kidney damage, DVT/PE risk, fluid retention, pleural/pericardial effusion, somnolence, electrolyte/LFT imbalance, vein exercises and/or the possible need for vascular access/port placement.  The patient was advice that although uncommon, leakage of an infused medication from the vein or venous access device (port) may lead to skin breakdown and/or other tissue damage.  The patient was advised that he/she may have pain, bleeding, and/or bruising from the insertion of a needle in their vein or venous access device (port).  The patient was further advised that,  in spite of proper technique, infection with redness and irritation may rarely occur at the site where the needle was inserted.  The patient was advised that if complications occur, additional medical treatment is available.    Discussion also included side effects specific to drugs in the treatment plan, specifically: Carboplatin, -16, Tecentriq, Neulasta: n/v, diarrhea, constipation , weakness, fatigue, headaches, body aches, anemia, neutropenia, loss of appetite, wt loss, taste changes, bone pain, itching, mouth sores, vision changes, hair loss, dizziness,     Reproductive risks were discussed, including appropriate use of birth control and protection during sexual relations.    A total of 60 minutes were spent with the patient, with 100% of time spent in education and counseling.

## 2024-11-05 ENCOUNTER — INFUSION (OUTPATIENT)
Dept: ONCOLOGY | Facility: HOSPITAL | Age: 89
End: 2024-11-05
Payer: MEDICARE

## 2024-11-05 ENCOUNTER — DOCUMENTATION (OUTPATIENT)
Dept: RADIATION ONCOLOGY | Facility: HOSPITAL | Age: 89
End: 2024-11-05
Payer: MEDICARE

## 2024-11-05 ENCOUNTER — LAB (OUTPATIENT)
Dept: LAB | Facility: HOSPITAL | Age: 89
End: 2024-11-05
Payer: MEDICARE

## 2024-11-05 VITALS
HEIGHT: 59 IN | DIASTOLIC BLOOD PRESSURE: 64 MMHG | TEMPERATURE: 97.6 F | OXYGEN SATURATION: 100 % | WEIGHT: 126.2 LBS | BODY MASS INDEX: 25.44 KG/M2 | SYSTOLIC BLOOD PRESSURE: 183 MMHG | RESPIRATION RATE: 16 BRPM | HEART RATE: 67 BPM

## 2024-11-05 DIAGNOSIS — C34.81 SMALL CELL CARCINOMA OF OVERLAPPING SITES OF RIGHT LUNG: ICD-10-CM

## 2024-11-05 DIAGNOSIS — C34.81 SMALL CELL CARCINOMA OF OVERLAPPING SITES OF RIGHT LUNG: Primary | ICD-10-CM

## 2024-11-05 DIAGNOSIS — R94.6 ABNORMAL RESULTS OF THYROID FUNCTION STUDIES: ICD-10-CM

## 2024-11-05 LAB
ALBUMIN SERPL-MCNC: 3.6 G/DL (ref 3.5–5.2)
ALBUMIN/GLOB SERPL: 1.6 G/DL
ALP SERPL-CCNC: 162 U/L (ref 39–117)
ALT SERPL W P-5'-P-CCNC: 8 U/L (ref 1–33)
ANION GAP SERPL CALCULATED.3IONS-SCNC: 10 MMOL/L (ref 5–15)
AST SERPL-CCNC: 17 U/L (ref 1–32)
BASOPHILS # BLD AUTO: 0.05 10*3/MM3 (ref 0–0.2)
BASOPHILS NFR BLD AUTO: 0.7 % (ref 0–1.5)
BILIRUB SERPL-MCNC: 0.6 MG/DL (ref 0–1.2)
BUN SERPL-MCNC: 17 MG/DL (ref 8–23)
BUN/CREAT SERPL: 19.5 (ref 7–25)
CALCIUM SPEC-SCNC: 8.9 MG/DL (ref 8.6–10.5)
CHLORIDE SERPL-SCNC: 105 MMOL/L (ref 98–107)
CO2 SERPL-SCNC: 24 MMOL/L (ref 22–29)
CREAT SERPL-MCNC: 0.87 MG/DL (ref 0.57–1)
DEPRECATED RDW RBC AUTO: 42.3 FL (ref 37–54)
EGFRCR SERPLBLD CKD-EPI 2021: 63.8 ML/MIN/1.73
EOSINOPHIL # BLD AUTO: 0.47 10*3/MM3 (ref 0–0.4)
EOSINOPHIL NFR BLD AUTO: 6.4 % (ref 0.3–6.2)
ERYTHROCYTE [DISTWIDTH] IN BLOOD BY AUTOMATED COUNT: 12.9 % (ref 12.3–15.4)
GLOBULIN UR ELPH-MCNC: 2.2 GM/DL
GLUCOSE SERPL-MCNC: 96 MG/DL (ref 65–99)
HCT VFR BLD AUTO: 36.4 % (ref 34–46.6)
HGB BLD-MCNC: 11.6 G/DL (ref 12–15.9)
IMM GRANULOCYTES # BLD AUTO: 0.02 10*3/MM3 (ref 0–0.05)
IMM GRANULOCYTES NFR BLD AUTO: 0.3 % (ref 0–0.5)
LYMPHOCYTES # BLD AUTO: 1.45 10*3/MM3 (ref 0.7–3.1)
LYMPHOCYTES NFR BLD AUTO: 19.8 % (ref 19.6–45.3)
MAGNESIUM SERPL-MCNC: 2 MG/DL (ref 1.6–2.4)
MCH RBC QN AUTO: 28.7 PG (ref 26.6–33)
MCHC RBC AUTO-ENTMCNC: 31.9 G/DL (ref 31.5–35.7)
MCV RBC AUTO: 90.1 FL (ref 79–97)
MONOCYTES # BLD AUTO: 0.66 10*3/MM3 (ref 0.1–0.9)
MONOCYTES NFR BLD AUTO: 9 % (ref 5–12)
NEUTROPHILS NFR BLD AUTO: 4.66 10*3/MM3 (ref 1.7–7)
NEUTROPHILS NFR BLD AUTO: 63.8 % (ref 42.7–76)
NRBC BLD AUTO-RTO: 0 /100 WBC (ref 0–0.2)
PLATELET # BLD AUTO: 163 10*3/MM3 (ref 140–450)
PMV BLD AUTO: 9.7 FL (ref 6–12)
POTASSIUM SERPL-SCNC: 4.5 MMOL/L (ref 3.5–5.2)
PROT SERPL-MCNC: 5.8 G/DL (ref 6–8.5)
RBC # BLD AUTO: 4.04 10*6/MM3 (ref 3.77–5.28)
SODIUM SERPL-SCNC: 139 MMOL/L (ref 136–145)
T4 FREE SERPL-MCNC: 1.38 NG/DL (ref 0.93–1.7)
TSH SERPL DL<=0.05 MIU/L-ACNC: 3.07 UIU/ML (ref 0.27–4.2)
WBC NRBC COR # BLD AUTO: 7.31 10*3/MM3 (ref 3.4–10.8)

## 2024-11-05 PROCEDURE — 83735 ASSAY OF MAGNESIUM: CPT

## 2024-11-05 PROCEDURE — 96413 CHEMO IV INFUSION 1 HR: CPT

## 2024-11-05 PROCEDURE — A9270 NON-COVERED ITEM OR SERVICE: HCPCS | Performed by: INTERNAL MEDICINE

## 2024-11-05 PROCEDURE — 36415 COLL VENOUS BLD VENIPUNCTURE: CPT

## 2024-11-05 PROCEDURE — 80053 COMPREHEN METABOLIC PANEL: CPT

## 2024-11-05 PROCEDURE — 84443 ASSAY THYROID STIM HORMONE: CPT

## 2024-11-05 PROCEDURE — 25010000002 CARBOPLATIN PER 50 MG: Performed by: INTERNAL MEDICINE

## 2024-11-05 PROCEDURE — 96375 TX/PRO/DX INJ NEW DRUG ADDON: CPT

## 2024-11-05 PROCEDURE — 25010000002 DEXAMETHASONE PER 1 MG: Performed by: INTERNAL MEDICINE

## 2024-11-05 PROCEDURE — 25010000002 ATEZOLIZUMAB 1200 MG/20ML SOLUTION 20 ML VIAL: Performed by: INTERNAL MEDICINE

## 2024-11-05 PROCEDURE — 25010000002 PALONOSETRON PER 25 MCG: Performed by: INTERNAL MEDICINE

## 2024-11-05 PROCEDURE — 25810000003 SODIUM CHLORIDE 0.9 % SOLUTION: Performed by: INTERNAL MEDICINE

## 2024-11-05 PROCEDURE — 25010000002 HEPARIN LOCK FLUSH PER 10 UNITS: Performed by: INTERNAL MEDICINE

## 2024-11-05 PROCEDURE — 84439 ASSAY OF FREE THYROXINE: CPT

## 2024-11-05 PROCEDURE — 63710000001 APREPITANT PER 5 MG: Performed by: INTERNAL MEDICINE

## 2024-11-05 PROCEDURE — 25810000003 SODIUM CHLORIDE 0.9 % SOLUTION 250 ML FLEX CONT: Performed by: INTERNAL MEDICINE

## 2024-11-05 PROCEDURE — 85025 COMPLETE CBC W/AUTO DIFF WBC: CPT

## 2024-11-05 PROCEDURE — 25010000002 ETOPOSIDE 500 MG/25ML SOLUTION 25 ML VIAL: Performed by: INTERNAL MEDICINE

## 2024-11-05 PROCEDURE — 25810000003 SODIUM CHLORIDE 0.9 % SOLUTION 500 ML FLEX CONT: Performed by: INTERNAL MEDICINE

## 2024-11-05 PROCEDURE — 96417 CHEMO IV INFUS EACH ADDL SEQ: CPT

## 2024-11-05 RX ORDER — HEPARIN SODIUM (PORCINE) LOCK FLUSH IV SOLN 100 UNIT/ML 100 UNIT/ML
500 SOLUTION INTRAVENOUS AS NEEDED
Status: CANCELLED | OUTPATIENT
Start: 2024-11-05

## 2024-11-05 RX ORDER — SODIUM CHLORIDE 9 MG/ML
20 INJECTION, SOLUTION INTRAVENOUS ONCE
Status: COMPLETED | OUTPATIENT
Start: 2024-11-05 | End: 2024-11-05

## 2024-11-05 RX ORDER — PALONOSETRON 0.05 MG/ML
0.25 INJECTION, SOLUTION INTRAVENOUS ONCE
Status: CANCELLED | OUTPATIENT
Start: 2024-11-05

## 2024-11-05 RX ORDER — DIPHENHYDRAMINE HYDROCHLORIDE 50 MG/ML
50 INJECTION INTRAMUSCULAR; INTRAVENOUS AS NEEDED
Status: CANCELLED | OUTPATIENT
Start: 2024-11-07

## 2024-11-05 RX ORDER — DIPHENHYDRAMINE HYDROCHLORIDE 50 MG/ML
50 INJECTION INTRAMUSCULAR; INTRAVENOUS AS NEEDED
Status: CANCELLED | OUTPATIENT
Start: 2024-11-05

## 2024-11-05 RX ORDER — APREPITANT 80 MG/1
80 CAPSULE ORAL DAILY
Status: CANCELLED
Start: 2024-11-05

## 2024-11-05 RX ORDER — APREPITANT 80 MG/1
80 CAPSULE ORAL ONCE
Status: COMPLETED | OUTPATIENT
Start: 2024-11-05 | End: 2024-11-05

## 2024-11-05 RX ORDER — SODIUM CHLORIDE 9 MG/ML
20 INJECTION, SOLUTION INTRAVENOUS ONCE
Status: CANCELLED | OUTPATIENT
Start: 2024-11-05

## 2024-11-05 RX ORDER — SODIUM CHLORIDE 9 MG/ML
20 INJECTION, SOLUTION INTRAVENOUS ONCE
Status: CANCELLED | OUTPATIENT
Start: 2024-11-07

## 2024-11-05 RX ORDER — DIPHENHYDRAMINE HYDROCHLORIDE 50 MG/ML
50 INJECTION INTRAMUSCULAR; INTRAVENOUS AS NEEDED
Status: CANCELLED | OUTPATIENT
Start: 2024-11-06

## 2024-11-05 RX ORDER — FAMOTIDINE 10 MG/ML
20 INJECTION, SOLUTION INTRAVENOUS AS NEEDED
Status: CANCELLED | OUTPATIENT
Start: 2024-11-06

## 2024-11-05 RX ORDER — PALONOSETRON 0.05 MG/ML
0.25 INJECTION, SOLUTION INTRAVENOUS ONCE
Status: COMPLETED | OUTPATIENT
Start: 2024-11-05 | End: 2024-11-05

## 2024-11-05 RX ORDER — FAMOTIDINE 10 MG/ML
20 INJECTION, SOLUTION INTRAVENOUS AS NEEDED
Status: DISCONTINUED | OUTPATIENT
Start: 2024-11-05 | End: 2024-11-05 | Stop reason: HOSPADM

## 2024-11-05 RX ORDER — SODIUM CHLORIDE 9 MG/ML
20 INJECTION, SOLUTION INTRAVENOUS ONCE
Status: CANCELLED | OUTPATIENT
Start: 2024-11-06

## 2024-11-05 RX ORDER — HEPARIN SODIUM (PORCINE) LOCK FLUSH IV SOLN 100 UNIT/ML 100 UNIT/ML
500 SOLUTION INTRAVENOUS AS NEEDED
Status: DISCONTINUED | OUTPATIENT
Start: 2024-11-05 | End: 2024-11-05 | Stop reason: HOSPADM

## 2024-11-05 RX ORDER — DIPHENHYDRAMINE HYDROCHLORIDE 50 MG/ML
50 INJECTION INTRAMUSCULAR; INTRAVENOUS AS NEEDED
Status: DISCONTINUED | OUTPATIENT
Start: 2024-11-05 | End: 2024-11-05 | Stop reason: HOSPADM

## 2024-11-05 RX ORDER — SODIUM CHLORIDE 0.9 % (FLUSH) 0.9 %
10 SYRINGE (ML) INJECTION AS NEEDED
Status: CANCELLED | OUTPATIENT
Start: 2024-11-05

## 2024-11-05 RX ORDER — FAMOTIDINE 10 MG/ML
20 INJECTION, SOLUTION INTRAVENOUS AS NEEDED
Status: CANCELLED | OUTPATIENT
Start: 2024-11-07

## 2024-11-05 RX ORDER — DEXAMETHASONE SODIUM PHOSPHATE 10 MG/ML
10 INJECTION INTRAMUSCULAR; INTRAVENOUS ONCE
Status: COMPLETED | OUTPATIENT
Start: 2024-11-05 | End: 2024-11-05

## 2024-11-05 RX ORDER — SODIUM CHLORIDE 0.9 % (FLUSH) 0.9 %
10 SYRINGE (ML) INJECTION AS NEEDED
Status: DISCONTINUED | OUTPATIENT
Start: 2024-11-05 | End: 2024-11-05 | Stop reason: HOSPADM

## 2024-11-05 RX ORDER — PROCHLORPERAZINE MALEATE 10 MG
10 TABLET ORAL EVERY 6 HOURS PRN
Qty: 60 TABLET | Refills: 1 | Status: SHIPPED | OUTPATIENT
Start: 2024-11-05

## 2024-11-05 RX ORDER — FAMOTIDINE 10 MG/ML
20 INJECTION, SOLUTION INTRAVENOUS AS NEEDED
Status: CANCELLED | OUTPATIENT
Start: 2024-11-05

## 2024-11-05 RX ADMIN — ETOPOSIDE 120 MG: 20 INJECTION, SOLUTION INTRAVENOUS at 11:22

## 2024-11-05 RX ADMIN — PALONOSETRON HYDROCHLORIDE 0.25 MG: 0.25 INJECTION, SOLUTION INTRAVENOUS at 09:51

## 2024-11-05 RX ADMIN — APREPITANT 80 MG: 80 CAPSULE ORAL at 09:50

## 2024-11-05 RX ADMIN — SODIUM CHLORIDE 20 ML/HR: 9 INJECTION, SOLUTION INTRAVENOUS at 09:50

## 2024-11-05 RX ADMIN — Medication 10 ML: at 13:30

## 2024-11-05 RX ADMIN — DEXAMETHASONE SODIUM PHOSPHATE 10 MG: 10 INJECTION INTRAMUSCULAR; INTRAVENOUS at 09:52

## 2024-11-05 RX ADMIN — ATEZOLIZUMAB 1200 MG: 1200 INJECTION, SOLUTION INTRAVENOUS at 10:09

## 2024-11-05 RX ADMIN — CARBOPLATIN 320 MG: 10 INJECTION, SOLUTION INTRAVENOUS at 12:35

## 2024-11-05 RX ADMIN — HEPARIN 500 UNITS: 100 SYRINGE at 13:31

## 2024-11-05 NOTE — PROGRESS NOTES
Message to Hem/Onc and asked if we needed TSH and T4 free BEFORE starting Tecentriq because last one I see is from a scanned lab from July 2024.      Message from Mana, I asked Dr. Davis and he does want it before. I have placed orders that will uses specimen already collected and marked them STAT.    I informed patient and daughter of labs needing to be done prior to treatment, but can use labs already drawn for the tests; they voiced understanding.

## 2024-11-05 NOTE — PROGRESS NOTES
Message to Hem/Onc. Okay, thanks. Also, on Alka Nicolas [birth year], her bp is 188/68 manually, but she forgot to take her meds and family has gone home to get it for her.  I did see in flow sheet that she had been 174/70 on 10/28/2024.  Okay to begin treatment; she said she runs high sometimes.  Please advise.

## 2024-11-05 NOTE — PROGRESS NOTES
ACE met with Mrs. 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 ACE introduced self and explained role and source of support. She is 89 years old and lives with her , they have been  for 72 years. Mrs. Nicolas states she has a strong support system which includes her five children, grandchildren, and great grandchildren. Several family members live nearby and are available to check in on her as needed. Currently, she does not have transportation or financial concerns.     Mrs. Nicolas states she is very nervous about starting treatments. Mrs. Nicolas states, “I never thought I would do treatment, but I want to live, so I have to do them.” Emotional support provided and encouraged her to express her feeling. She wants to continue to be as independent as possible and hopes to stay strong. Mrs. Nicolas likes to sew and listen to the TV. In the past she enjoyed reading but is unable to that due to her vision. Mrs. Nicolas denied having pain. She will feel fatigued if she does not sleep well. Her spouse has dementia and she worries about him. ACE did speak to her regarding support and peer to peer groups. ACE will remain available.

## 2024-11-06 ENCOUNTER — INFUSION (OUTPATIENT)
Dept: ONCOLOGY | Facility: HOSPITAL | Age: 89
End: 2024-11-06
Payer: MEDICARE

## 2024-11-06 VITALS
WEIGHT: 129 LBS | OXYGEN SATURATION: 98 % | TEMPERATURE: 97.5 F | BODY MASS INDEX: 26 KG/M2 | RESPIRATION RATE: 18 BRPM | HEIGHT: 59 IN | DIASTOLIC BLOOD PRESSURE: 50 MMHG | HEART RATE: 57 BPM | SYSTOLIC BLOOD PRESSURE: 138 MMHG

## 2024-11-06 DIAGNOSIS — C34.81 SMALL CELL CARCINOMA OF OVERLAPPING SITES OF RIGHT LUNG: Primary | ICD-10-CM

## 2024-11-06 PROCEDURE — 25810000003 SODIUM CHLORIDE 0.9 % SOLUTION 500 ML FLEX CONT: Performed by: INTERNAL MEDICINE

## 2024-11-06 PROCEDURE — 25010000002 ETOPOSIDE 500 MG/25ML SOLUTION 25 ML VIAL: Performed by: INTERNAL MEDICINE

## 2024-11-06 PROCEDURE — 25010000002 HEPARIN LOCK FLUSH PER 10 UNITS: Performed by: INTERNAL MEDICINE

## 2024-11-06 PROCEDURE — 25810000003 SODIUM CHLORIDE 0.9 % SOLUTION: Performed by: INTERNAL MEDICINE

## 2024-11-06 PROCEDURE — 96375 TX/PRO/DX INJ NEW DRUG ADDON: CPT

## 2024-11-06 PROCEDURE — 96413 CHEMO IV INFUSION 1 HR: CPT

## 2024-11-06 PROCEDURE — 25010000002 DEXAMETHASONE PER 1 MG: Performed by: INTERNAL MEDICINE

## 2024-11-06 RX ORDER — FAMOTIDINE 10 MG/ML
20 INJECTION, SOLUTION INTRAVENOUS AS NEEDED
Status: DISCONTINUED | OUTPATIENT
Start: 2024-11-06 | End: 2024-11-06 | Stop reason: HOSPADM

## 2024-11-06 RX ORDER — SODIUM CHLORIDE 9 MG/ML
20 INJECTION, SOLUTION INTRAVENOUS ONCE
Status: COMPLETED | OUTPATIENT
Start: 2024-11-06 | End: 2024-11-06

## 2024-11-06 RX ORDER — HEPARIN SODIUM (PORCINE) LOCK FLUSH IV SOLN 100 UNIT/ML 100 UNIT/ML
500 SOLUTION INTRAVENOUS AS NEEDED
Status: CANCELLED | OUTPATIENT
Start: 2024-11-06

## 2024-11-06 RX ORDER — DEXAMETHASONE SODIUM PHOSPHATE 10 MG/ML
10 INJECTION INTRAMUSCULAR; INTRAVENOUS ONCE
Status: COMPLETED | OUTPATIENT
Start: 2024-11-06 | End: 2024-11-06

## 2024-11-06 RX ORDER — DIPHENHYDRAMINE HYDROCHLORIDE 50 MG/ML
50 INJECTION INTRAMUSCULAR; INTRAVENOUS AS NEEDED
Status: DISCONTINUED | OUTPATIENT
Start: 2024-11-06 | End: 2024-11-06 | Stop reason: HOSPADM

## 2024-11-06 RX ORDER — SODIUM CHLORIDE 0.9 % (FLUSH) 0.9 %
10 SYRINGE (ML) INJECTION AS NEEDED
Status: CANCELLED | OUTPATIENT
Start: 2024-11-06

## 2024-11-06 RX ORDER — HEPARIN SODIUM (PORCINE) LOCK FLUSH IV SOLN 100 UNIT/ML 100 UNIT/ML
500 SOLUTION INTRAVENOUS AS NEEDED
Status: DISCONTINUED | OUTPATIENT
Start: 2024-11-06 | End: 2024-11-06 | Stop reason: HOSPADM

## 2024-11-06 RX ORDER — SODIUM CHLORIDE 0.9 % (FLUSH) 0.9 %
10 SYRINGE (ML) INJECTION AS NEEDED
Status: DISCONTINUED | OUTPATIENT
Start: 2024-11-06 | End: 2024-11-06 | Stop reason: HOSPADM

## 2024-11-06 RX ADMIN — ETOPOSIDE 120 MG: 20 INJECTION, SOLUTION INTRAVENOUS at 09:17

## 2024-11-06 RX ADMIN — Medication 10 ML: at 10:40

## 2024-11-06 RX ADMIN — SODIUM CHLORIDE 20 ML/HR: 9 INJECTION, SOLUTION INTRAVENOUS at 08:43

## 2024-11-06 RX ADMIN — HEPARIN 500 UNITS: 100 SYRINGE at 10:41

## 2024-11-06 RX ADMIN — DEXAMETHASONE SODIUM PHOSPHATE 10 MG: 10 INJECTION INTRAMUSCULAR; INTRAVENOUS at 08:46

## 2024-11-07 ENCOUNTER — INFUSION (OUTPATIENT)
Dept: ONCOLOGY | Facility: HOSPITAL | Age: 89
End: 2024-11-07
Payer: MEDICARE

## 2024-11-07 VITALS
HEART RATE: 59 BPM | SYSTOLIC BLOOD PRESSURE: 144 MMHG | HEIGHT: 59 IN | DIASTOLIC BLOOD PRESSURE: 55 MMHG | OXYGEN SATURATION: 97 % | RESPIRATION RATE: 16 BRPM | BODY MASS INDEX: 26.61 KG/M2 | WEIGHT: 132 LBS | TEMPERATURE: 97.2 F

## 2024-11-07 DIAGNOSIS — C34.81 SMALL CELL CARCINOMA OF OVERLAPPING SITES OF RIGHT LUNG: Primary | ICD-10-CM

## 2024-11-07 DIAGNOSIS — T82.598A OTHER MECHANICAL COMPLICATION OF OTHER CARDIAC AND VASCULAR DEVICES AND IMPLANTS, INITIAL ENCOUNTER: Primary | ICD-10-CM

## 2024-11-07 PROCEDURE — 25010000002 ETOPOSIDE 500 MG/25ML SOLUTION 25 ML VIAL: Performed by: INTERNAL MEDICINE

## 2024-11-07 PROCEDURE — 96377 APPLICATON ON-BODY INJECTOR: CPT

## 2024-11-07 PROCEDURE — 96375 TX/PRO/DX INJ NEW DRUG ADDON: CPT

## 2024-11-07 PROCEDURE — 25810000003 SODIUM CHLORIDE 0.9 % SOLUTION 500 ML FLEX CONT: Performed by: INTERNAL MEDICINE

## 2024-11-07 PROCEDURE — 25010000002 DEXAMETHASONE PER 1 MG: Performed by: INTERNAL MEDICINE

## 2024-11-07 PROCEDURE — 96413 CHEMO IV INFUSION 1 HR: CPT

## 2024-11-07 PROCEDURE — 25010000002 PEGFILGRASTIM 6 MG/0.6ML PREFILLED SYRINGE KIT: Performed by: INTERNAL MEDICINE

## 2024-11-07 PROCEDURE — 25010000002 HEPARIN LOCK FLUSH PER 10 UNITS: Performed by: INTERNAL MEDICINE

## 2024-11-07 RX ORDER — DIPHENHYDRAMINE HYDROCHLORIDE 50 MG/ML
50 INJECTION INTRAMUSCULAR; INTRAVENOUS AS NEEDED
Status: DISCONTINUED | OUTPATIENT
Start: 2024-11-07 | End: 2024-11-07 | Stop reason: HOSPADM

## 2024-11-07 RX ORDER — DIPHENHYDRAMINE HYDROCHLORIDE 50 MG/ML
50 INJECTION INTRAMUSCULAR; INTRAVENOUS AS NEEDED
OUTPATIENT
Start: 2024-11-27

## 2024-11-07 RX ORDER — HEPARIN SODIUM (PORCINE) LOCK FLUSH IV SOLN 100 UNIT/ML 100 UNIT/ML
500 SOLUTION INTRAVENOUS AS NEEDED
Status: DISCONTINUED | OUTPATIENT
Start: 2024-11-07 | End: 2024-11-07 | Stop reason: HOSPADM

## 2024-11-07 RX ORDER — DIPHENHYDRAMINE HYDROCHLORIDE 50 MG/ML
50 INJECTION INTRAMUSCULAR; INTRAVENOUS AS NEEDED
OUTPATIENT
Start: 2024-11-26

## 2024-11-07 RX ORDER — PALONOSETRON 0.05 MG/ML
0.25 INJECTION, SOLUTION INTRAVENOUS ONCE
OUTPATIENT
Start: 2024-11-26

## 2024-11-07 RX ORDER — FAMOTIDINE 10 MG/ML
20 INJECTION, SOLUTION INTRAVENOUS AS NEEDED
Status: DISCONTINUED | OUTPATIENT
Start: 2024-11-07 | End: 2024-11-07 | Stop reason: HOSPADM

## 2024-11-07 RX ORDER — SODIUM CHLORIDE 0.9 % (FLUSH) 0.9 %
10 SYRINGE (ML) INJECTION AS NEEDED
Status: DISCONTINUED | OUTPATIENT
Start: 2024-11-07 | End: 2024-11-07 | Stop reason: HOSPADM

## 2024-11-07 RX ORDER — SODIUM CHLORIDE 9 MG/ML
20 INJECTION, SOLUTION INTRAVENOUS ONCE
OUTPATIENT
Start: 2024-11-28

## 2024-11-07 RX ORDER — DEXAMETHASONE SODIUM PHOSPHATE 10 MG/ML
10 INJECTION INTRAMUSCULAR; INTRAVENOUS ONCE
Status: COMPLETED | OUTPATIENT
Start: 2024-11-07 | End: 2024-11-07

## 2024-11-07 RX ORDER — SODIUM CHLORIDE 0.9 % (FLUSH) 0.9 %
10 SYRINGE (ML) INJECTION AS NEEDED
OUTPATIENT
Start: 2024-11-07

## 2024-11-07 RX ORDER — HEPARIN SODIUM (PORCINE) LOCK FLUSH IV SOLN 100 UNIT/ML 100 UNIT/ML
500 SOLUTION INTRAVENOUS AS NEEDED
OUTPATIENT
Start: 2024-11-07

## 2024-11-07 RX ORDER — FAMOTIDINE 10 MG/ML
20 INJECTION, SOLUTION INTRAVENOUS AS NEEDED
OUTPATIENT
Start: 2024-11-26

## 2024-11-07 RX ORDER — APREPITANT 80 MG/1
80 CAPSULE ORAL DAILY
Start: 2024-11-26

## 2024-11-07 RX ORDER — DIPHENHYDRAMINE HYDROCHLORIDE 50 MG/ML
50 INJECTION INTRAMUSCULAR; INTRAVENOUS AS NEEDED
OUTPATIENT
Start: 2024-11-28

## 2024-11-07 RX ORDER — SODIUM CHLORIDE 9 MG/ML
20 INJECTION, SOLUTION INTRAVENOUS ONCE
Status: DISCONTINUED | OUTPATIENT
Start: 2024-11-07 | End: 2024-11-07 | Stop reason: HOSPADM

## 2024-11-07 RX ORDER — SODIUM CHLORIDE 9 MG/ML
20 INJECTION, SOLUTION INTRAVENOUS ONCE
OUTPATIENT
Start: 2024-11-27

## 2024-11-07 RX ORDER — SODIUM CHLORIDE 9 MG/ML
20 INJECTION, SOLUTION INTRAVENOUS ONCE
OUTPATIENT
Start: 2024-11-26

## 2024-11-07 RX ORDER — FAMOTIDINE 10 MG/ML
20 INJECTION, SOLUTION INTRAVENOUS AS NEEDED
OUTPATIENT
Start: 2024-11-28

## 2024-11-07 RX ORDER — FAMOTIDINE 10 MG/ML
20 INJECTION, SOLUTION INTRAVENOUS AS NEEDED
OUTPATIENT
Start: 2024-11-27

## 2024-11-07 RX ADMIN — DEXAMETHASONE SODIUM PHOSPHATE 10 MG: 10 INJECTION INTRAMUSCULAR; INTRAVENOUS at 11:12

## 2024-11-07 RX ADMIN — ETOPOSIDE 120 MG: 20 INJECTION, SOLUTION INTRAVENOUS at 11:17

## 2024-11-07 RX ADMIN — PEGFILGRASTIM 6 MG: KIT SUBCUTANEOUS at 13:31

## 2024-11-07 RX ADMIN — HEPARIN 500 UNITS: 100 SYRINGE at 12:39

## 2024-11-07 RX ADMIN — Medication 10 ML: at 12:39

## 2024-11-07 NOTE — PROGRESS NOTES
Patient asked about getting pod instead of injection tomorrow and notified office who added neulasta pod for today. Mayr Tuttle RN

## 2024-11-07 NOTE — PROGRESS NOTES
Port did not draw today, D3 of -16, it was reaccessed and still no blood return.  Per Dr Davis/Mana, okay to give peripherally and will try to get port study done today, if possible.

## 2024-11-08 ENCOUNTER — TELEPHONE (OUTPATIENT)
Dept: ONCOLOGY | Facility: CLINIC | Age: 89
End: 2024-11-08
Payer: MEDICARE

## 2024-11-08 ENCOUNTER — HOSPITAL ENCOUNTER (OUTPATIENT)
Dept: INTERVENTIONAL RADIOLOGY/VASCULAR | Facility: HOSPITAL | Age: 89
Discharge: HOME | End: 2024-11-08
Payer: MEDICARE

## 2024-11-08 VITALS — HEART RATE: 54 BPM | DIASTOLIC BLOOD PRESSURE: 70 MMHG | OXYGEN SATURATION: 97 % | SYSTOLIC BLOOD PRESSURE: 189 MMHG

## 2024-11-08 DIAGNOSIS — T82.598A OTHER MECHANICAL COMPLICATION OF OTHER CARDIAC AND VASCULAR DEVICES AND IMPLANTS, INITIAL ENCOUNTER: ICD-10-CM

## 2024-11-08 PROCEDURE — 25010000002 HEPARIN LOCK FLUSH PER 10 UNITS: Performed by: RADIOLOGY

## 2024-11-08 RX ORDER — HEPARIN SODIUM (PORCINE) LOCK FLUSH IV SOLN 100 UNIT/ML 100 UNIT/ML
500 SOLUTION INTRAVENOUS ONCE
Status: COMPLETED | OUTPATIENT
Start: 2024-11-08 | End: 2024-11-08

## 2024-11-08 RX ADMIN — HEPARIN 500 UNITS: 100 SYRINGE at 10:18

## 2024-11-08 NOTE — TELEPHONE ENCOUNTER
Caller: Hui Machado    Relationship: Emergency Contact    Best call back number: [phone]    What is the best time to reach you: ANYTIME    Who are you requesting to speak with (clinical staff, provider,  specific staff member): DR SCHULTE'S NURSE      What was the call regarding:     ISSUES WITH CANDIS BARR, WANTED TO DISCUSS WITH NURSE  ( NO OTHER DETAILS GIVEN)

## 2024-11-08 NOTE — NURSING NOTE
Patient came in with Neulasta pod on her L arm that was placed in office on 11/7/24.  The radiologist was contacted and it was advised not to proceed with port study until pod was removed.   website also advised that pod should not be exposed to X-ray.  Called Dr. Davis's office and spoke with nurse Arin about cancelling patient's port study for today.  Will contact scheduling to get port study rescheduled to next week.  Patient and daughter voiced understanding of above.

## 2024-11-08 NOTE — TELEPHONE ENCOUNTER
PORT STUDY CANCELLED:  Received call from Evaristo in Radiology, she calls regarding patient Alka Nicolas. She reports patient has presented for her port study (due to no blood return with draw with her first chemotherapy txt). Patient reports she has a Neulasta On-Pro Pod on body, Evaristo  reports that today's scheduled port study will have to be canceled, it is prohibited to use radiation with a Neulasta On-Pro Pod on body.  Today Port Study will be cancelled and Evaristo will deandre apt next week once the Pod come off today.    NOTE:  Tech reports that while accessing patient this am for the procedure prior to notification from patient that she did have the Neulasta pod on her body that she did obtain a blood return.     Patient to be notified via radiology dept once her Port Study apt is deandre.

## 2024-11-08 NOTE — TELEPHONE ENCOUNTER
See other documentation, todays port study was cancelled patient has neulasta on-pro pod on post chemotherapy treatment 11/7/24.  Radiology/heart center will deandre and contact patient with time and date

## 2024-11-09 NOTE — PROGRESS NOTES
[facility] HEMATOLOGY & ONCOLOGY  [address]  [place] [address] [phone]    Patient Name: Alka Nicolas  Encounter Date: 11/18/2024  YOB: 1935  Patient Number: [id number]    REASON FOR VISIT: Alka Nicolas is an 89 yoF who returns in follow-up of small cell carcinoma: stage: IV (cT3, cN2, M1), extensive stage.   She has opted to receive palliative systemic therapy: Carboplatin+etoposide (VP16)+atezolizumab, C1-11/5/24-11/7/24.  She is accompanied by her spouse John, and her daughters Hui and Allison    I have reviewed the HPI and verified with the patient the accuracy of it. No changes to interval history since the information was documented. Vance Davis MD 11/18/24      Diagnostic abnormalities:  - Medical history includes ex nicotine dependence, COPD, paroxysmal atrial fibrillation (Eliquis), CAD (PTCA to small RCA, 2018), hypothyroidism, hypertension, hyperlipidemia, multiple lung nodules and recently diagnosed small cell lung carcinoma.  -4/10/2024-CTA chest-no PE. In the posterolateral RIGHT upper lobe (series 5, image 51) there is a 1.5 x 1.9 x 1.3 cm nodular density abutting the pleural surface. I am suspicious that this may represent a focal infectious/inflammatory process; however, a true pulmonary nodule/neoplasm is not excluded and per Fleischner Society guidelines, in 3 months consider follow-up CT, PET/CT, or biopsy.  -8/8/2024-CT chest without contrast-1.  Enlarging spiculated 2.7 cm pulmonary nodule in the inferior right  upper lobe broadly abutting the pleura and minor fissure. This is favored to represent a primary lung neoplasm. Biopsy and staging PET/CT is recommended. 2.  1.0 cm right upper lobe pulmonary nodule with lobulated margins concerning for either a metastatic lesion or second primary lung neoplasm. 3.  Lytic lesion in the left posterolateral third rib, highly suspicious for  osseous metastasis. 4.  Multiple small 3 to 4 mm pulmonary nodules are present in both lungs. These are likely infectious/inflammatory in nature though pulmonary metastasis is also in the differential and special attention on follow-up imaging is recommended.  -9/25/2024- CT chest wo contrast-Enlarging 3.3 cm spiculated mass in the inferior right upper lobe broadly abutting the chest wall and minor fissure. This is highly suspicious for a primary lung neoplasm. Enlarging 1.4 cm right upper lobe pulmonary nodule, most likely representing an intrapulmonary metastasis or a second primary lung neoplasm. New right hilar and right paratracheal mediastinal lymphadenopathy, highly concerning for berta spread of neoplasm. Enlarging 16 mm lytic lesion in the left posterior lateral third rib, most likely representing osseous metastasis.  -9/30/2024-flexible fiberoptic bronchoscopy, Ion sensing navigational bronchoscopy, EBUS bronchial wash, transbronchial needle aspirate of mediastinum and lung.  Findings: 2.5 cm paratracheal node, results positive.Final diagnosis: 1.  Lung, right upper lobe, Ion fine-needle aspiration, smear (1) and cellblock: Positive for malignant cells, consistent with small cell carcinoma. 2.  Station 4R lymph node, endobronchial ultrasound-guided fine-needle aspiration, smear (1) and ThinPrep preparation (1): A.  Positive for malignant cells, consistent with metastatic small cell carcinoma. B.  Background lymphocytes present. 3.  Bronchial washings, ThinPrep preparation (1): A.  Degenerated cells present, suspicious for small cell carcinoma. B.  Ciliated columnar bronchial epithelial cells and macrophages.  AJCC stage: pTX pNX  - 10/23/24- MRI brain- 1. No evidence of intracranial metastasis. No abnormal intracranial enhancement. No acute signs of ischemia, hemorrhage, or mass. 2. Nonenhancing hyperintense T2 FLAIR signal changes favoring chronic small vessel ischemia. 3. Small right greater than left  mastoid effusions.  - 10/28/24- alk phos 188 otherwise normal CMP.  CBC normal   - 10/28/24- PET scan- 1.  Enlarged hypermetabolic 3.9 cm subpleural right upper lobe lung mass. Enlarging 1.5 cm hypermetabolic right upper lobe pulmonary nodule. Both of these are likely related to primary lung neoplasm. 2.  New and enlarging right hilar, right mediastinal, and right supraclavicular hypermetabolic lymph nodes, in keeping with berta spread  of neoplasm. 3.  Enlarging hypermetabolic expansile lytic lesion involving the left posterior third rib. This is compatible with osseous metastasis. 4.  1.5 cm hypermetabolic right adrenal gland nodule, most likely representing metastasis. 5.  2.3 cm hypermetabolic mass in the left paracolic gutter abutting the distal descending colon. Suspect this represents a soft tissue  metastatic implant although given abutment of the colon, a second neoplasm related the colon is also possible.    Previous interventions:  - 10/21/24- Consult Dr. Gallo of [facility]-I recommended the patient continue with Dr. Caldera's recommendation with the use of systemic therapy alone and no radiation therapy at this time   - Palliative carboplatin+etoposide (VP16)+atezolizumab, C1-11/5/24-11/7/24    LABS    Lab Results - Last 18 Months   Lab Units 11/18/24  1348 11/12/24  1034 11/05/24  0721 10/28/24  1015 09/25/24  1210 04/10/24  1039   HEMOGLOBIN g/dL 10.0* 11.1* 11.6* 12.5 13.1 12.3   HEMATOCRIT % 32.4* 34.8 36.4 37.4 40.5 38.4   MCV fL 91.5 91.1 90.1 88.4 89.4 88.7   WBC 10*3/mm3 12.07* 4.47 7.31 7.42 8.06 8.11   RDW % 13.0 12.7 12.9 13.1 13.4 12.8   MPV fL 10.9 10.0 9.7 9.8 9.9 9.9   PLATELETS 10*3/mm3 60* 89* 163 194 196 151   IMM GRAN % %  --   --  0.3 0.3  --  0.5   NEUTROS ABS 10*3/mm3 8.33* 3.52 4.66 5.28  --  5.89   LYMPHS ABS 10*3/mm3  --   --  1.45 1.25  --  1.41   MONOS ABS 10*3/mm3  --   --  0.66 0.56  --  0.46   EOS ABS 10*3/mm3 0.00 0.04 0.47* 0.27  --  0.29   BASOS ABS 10*3/mm3 0.12 0.04 0.05  0.04  --  0.02   IMMATURE GRANS (ABS) 10*3/mm3  --   --  0.02 0.02  --  0.04   NRBC /100 WBC 1.0*  --  0.0 0.0  --  0.0   NEUTROPHIL % % 59.0 78.8*  --   --   --   --    MONOCYTES % % 10.0 2.0*  --   --   --   --    BASOPHIL % % 1.0 1.0  --   --   --   --    ATYP LYMPH % % 1.0 2.0  --   --   --   --        Lab Results - Last 18 Months   Lab Units 11/18/24  1348 11/05/24  0721 10/28/24  1015 09/25/24  1210 04/10/24  1039   GLUCOSE mg/dL 148* 96 95 119* 119*   SODIUM mmol/L 140 139 138 139 142   POTASSIUM mmol/L 3.7 4.5 4.1 3.8 4.1   CO2 mmol/L 22.0 24.0 24.0 24.0 26.0   CHLORIDE mmol/L 109* 105 104 105 107   ANION GAP mmol/L 9.0 10.0 10.0 10.0 9.0   CREATININE mg/dL 1.02* 0.87 0.81 0.99 1.05*   BUN mg/dL 14 17 17 22 19   BUN / CREAT RATIO  13.7 19.5 21.0 22.2 18.1   CALCIUM mg/dL 8.4* 8.9 9.0 8.4* 9.3   ALK PHOS U/L 216* 162* 188*  --  144*   TOTAL PROTEIN g/dL 5.9* 5.8* 6.7  --  6.7   ALT (SGPT) U/L 16 8 10  --  11   AST (SGOT) U/L 21 17 17  --  13   BILIRUBIN mg/dL 0.3 0.6 0.7  --  0.6   ALBUMIN g/dL 3.7 3.6 4.0  --  4.1   GLOBULIN gm/dL 2.2 2.2 2.7  --  2.6           PAST MEDICAL HISTORY:  ALLERGIES:  No Known Allergies  CURRENT MEDICATIONS:  Outpatient Encounter Medications as of 11/18/2024   Medication Sig Dispense Refill    albuterol sulfate  (90 Base) MCG/ACT inhaler Inhale 2 puffs 4 (Four) Times a Day. 6.7 g 5    amLODIPine (NORVASC) 5 MG tablet TAKE ONE TABLET BY MOUH DAILY 90 tablet 1    amoxicillin (AMOXIL) 250 MG capsule Take 1 capsule by mouth Daily.      apixaban (ELIQUIS) 2.5 MG tablet tablet Take 1 tablet by mouth Every 12 (Twelve) Hours. Indications: Atrial Fibrillation 60 tablet 0    atorvastatin (LIPITOR) 40 MG tablet TAKE ONE TABLET BY MOUTH EVERY NIGHT. 90 tablet 4    carvedilol (COREG) 12.5 MG tablet Take 1 tablet by mouth 2 (Two) Times a Day. 60 tablet 11    cloNIDine (CATAPRES) 0.1 MG tablet Take 1 tablet by mouth Daily As Needed. For blood pressure > 180/90       HYDROcodone-acetaminophen (NORCO) 5-325 MG per tablet Take 1 tablet by mouth Every 6 (Six) Hours As Needed for Moderate Pain. 60 tablet 0    Hydrocortisone, Perianal, (ANUSOL-HC) 2.5 % rectal cream Insert  into the rectum As Needed.      levothyroxine (SYNTHROID, LEVOTHROID) 100 MCG tablet Take 1 tablet by mouth Daily. 90 tablet 0    lidocaine-prilocaine (EMLA) 2.5-2.5 % cream 30 minutes prior to access apply generous amount of Emla Cream to port site and cover with clear plastic wrap until ready for use 1 each 1    Multiple Vitamins-Minerals (PRESERVISION AREDS 2+MULTI VIT PO) Take 1 capsule by mouth Daily.      ondansetron (ZOFRAN) 8 MG tablet Take 1 tablet by mouth Every 8 (Eight) Hours As Needed for Nausea or Vomiting. 30 tablet 3    potassium chloride (K-DUR,KLOR-CON) 10 MEQ CR tablet Take 1 tablet by mouth Daily.      prochlorperazine (COMPAZINE) 10 MG tablet Take 1 tablet by mouth Every 6 (Six) Hours As Needed for Nausea or Vomiting. 60 tablet 1     No facility-administered encounter medications on file as of 11/18/2024.     ADULT ILLNESSES:  Patient Active Problem List   Diagnosis Code    CAD (coronary artery disease) I25.10    Hypertension, benign I10    Hyperlipidemia E78.5    Palpitations R00.2    Hypothyroidism (acquired) E03.9    Psoriasis L40.9    Vitamin B12 deficiency E53.8    Grade 1 out of 6 intensity murmur R01.1    Unstable angina I20.0    COPD, mild J44.9    Osteoarthritis M19.90    Pernicious anemia D51.0    Acute kidney injury N17.9    Hypokalemia E87.6    Metabolic acidosis E87.20    Acute cystitis N30.00    Pneumonitis J98.4    Chronic kidney disease, stage IV (severe) N18.4    Elevated troponin R79.89    Failure to thrive in adult R62.7    Moderate malnutrition E44.0    Paroxysmal atrial fibrillation I48.0    Acute UTI N39.0    Upper respiratory tract infection J06.9    Acute cough R05.1    Multiple lung nodules R91.8    Mediastinal adenopathy R59.0    Small cell carcinoma of overlapping  sites of lung C34.80    Current smoker on some days F17.200    Encounter for care related to vascular access port Z45.2     SURGERIES:  Past Surgical History:   Procedure Laterality Date    APPENDECTOMY      BRONCHOSCOPY N/A 9/30/2024    Procedure: BRONCHOSCOPY WITH ENDOBRONCHIAL ULTRASOUND;  Surgeon: Lucas Johnson MD;  Location:  [facility] OR;  Service: Pulmonary;  Laterality: N/A;  preop; lung nodule   postop lung nodules   PCP Roe Gardner    BRONCHOSCOPY WITH ION ROBOTIC ASSIST N/A 9/30/2024    Procedure: BRONCHOSCOPY WITH ION ROBOT and BRONCHOSCOPY WITH ENDOBRONCHIAL ULTRASOUND;  Surgeon: Lucas Johnson MD;  Location:  [facility] OR;  Service: Robotics - Pulmonary;  Laterality: N/A;  preop; lung nodules  postop lung nodules   PCP Roe Gardner    CARDIAC CATHETERIZATION      CARDIAC CATHETERIZATION Left 10/21/2020    Procedure: Cardiac Catheterization/Vascular Study;  Surgeon: Marcos Ratliff MD;  Location:  [facility] CATH INVASIVE LOCATION;  Service: Cardiology;  Laterality: Left;    CHOLECYSTECTOMY      HYSTERECTOMY      total    VENOUS ACCESS DEVICE (PORT) INSERTION N/A 10/29/2024    Procedure: Single Lumen Port-a-cath insertion with flouroscopy;  Surgeon: Sean Ibrahim MD;  Location: [facility] OR;  Service: General;  Laterality: N/A;     HEALTH MAINTENANCE ITEMS:  Health Maintenance Due   Topic Date Due    TDAP/TD VACCINES (1 - Tdap) Never done    ZOSTER VACCINE (1 of 2) Never done    LIPID PANEL  04/11/2024    ANNUAL WELLNESS VISIT  05/01/2024       <no information>  Last Completed Colonoscopy       This patient has no relevant Health Maintenance data.          Immunization History   Administered Date(s) Administered    ABRYSVO (RSV, 60+ or pregnant women 32-36 wks) 09/17/2024    COVID-19 (PFIZER) 12YRS+ (COMIRNATY) 11/04/2023, 10/11/2024    COVID-19 (PFIZER) BIVALENT 12+YRS 11/01/2022    COVID-19 (PFIZER) Purple Cap Monovalent 02/19/2021, 03/12/2021, 11/17/2021    Covid-19 (Pfizer) Gray Cap  Monovalent 2022    Fluad Quad 65+ 2020, 10/03/2023    Fluzone High-Dose 65+YRS 2016, 2016, 10/24/2017, 10/17/2018    Fluzone High-Dose 65+yrs 10/11/2021, 2022, 10/18/2024    Pneumococcal Conjugate 13-Valent (PCV13) 2015    Pneumococcal Polysaccharide (PPSV23) 10/21/2005, 2006, 10/21/2013     Last Completed Mammogram       This patient has no relevant Health Maintenance data.              FAMILY HISTORY:  Family History   Problem Relation Age of Onset    Diabetes Mother     Heart disease Father     Hyperlipidemia Father     Hypertension Father     Stroke Father     Breast cancer Neg Hx      SOCIAL HISTORY:  Social History     Socioeconomic History    Marital status:    Tobacco Use    Smoking status: Former     Current packs/day: 0.00     Types: Cigarettes     Quit date: 2020     Years since quittin.6    Smokeless tobacco: Never   Vaping Use    Vaping status: Never Used   Substance and Sexual Activity    Alcohol use: No    Drug use: No    Sexual activity: Not Currently     Partners: Male       REVIEW OF SYSTEMS:  Review of Systems   Constitutional:  Positive for appetite change (Poor) and unexpected weight loss (21 pounds in the past 5). Negative for activity change.        Manages her personal ADLs, including most chores chores but no longer runs errands and no longer drives.  She is up and about more than half the time.  She is still the primary caregiver for her     Chemo tolerance discussed:  Fatigue.  Only mild nausea but has not needed Zofran.  No vomiting. No mouth sores.  No skin changes.  No neuropathy.  No diarrhea   HENT:  Positive for hearing loss.    Eyes:  Positive for blurred vision (Macular degeneration).   Respiratory:  Positive for cough and shortness of breath (Baseline VALENCIA with SOB including routine activities).         Smoker, age 15-85--1/2 to 1 pack/day   Cardiovascular:  Negative for chest pain (Resolution pleuritic chest  "discomfort).   Gastrointestinal:  Negative for anal bleeding.   Endocrine: Negative.    Genitourinary:  Positive for urinary incontinence.   Musculoskeletal:  Positive for arthralgias (Chronic), back pain (\"arthritis\") and myalgias (Intermittent nocturnal leg cramps).   Skin: Negative.    Allergic/Immunologic: Negative.    Neurological: Negative.    Hematological: Negative.    Psychiatric/Behavioral: Negative.         /68   Pulse 55   Temp 96.7 °F (35.9 °C) (Temporal)   Resp 18   Ht 149.9 cm (59\")   Wt 56 kg (123 lb 6.4 oz)   LMP  (LMP Unknown)   SpO2 96%   BMI 24.92 kg/m²  Body surface area is 1.5 meters squared.  Pain Score    11/18/24 1407   PainSc: 0-No pain         Physical Exam  Vitals and nursing note reviewed.   Constitutional:       Comments: Pleasant, cooperative, slender, modestly kept elderly female, ambulatory.  ECOG 2.     No weight changes    HENT:      Head: Normocephalic and atraumatic.   Eyes:      General: No scleral icterus.     Extraocular Movements: Extraocular movements intact.      Pupils: Pupils are equal, round, and reactive to light.   Cardiovascular:      Rate and Rhythm: Normal rate.   Pulmonary:      Effort: Pulmonary effort is normal.      Comments: Distant breath sounds    Port left upper chest is well seated  Abdominal:      General: Abdomen is flat.      Tenderness: There is no abdominal tenderness.   Musculoskeletal:         General: Normal range of motion.      Cervical back: Normal range of motion.   Lymphadenopathy:      Cervical: No cervical adenopathy.   Skin:     General: Skin is warm.   Neurological:      General: No focal deficit present.      Mental Status: She is alert and oriented to person, place, and time.   Psychiatric:         Mood and Affect: Mood normal.         Behavior: Behavior normal.         Thought Content: Thought content normal.         ASSESSMENT:   1. Small cell carcinoma:   Stage: IV (cT3, cN2, M1), extensive stage   Tumor [place]:   "   -4/10/2024-CTA chest-no PE. In the posterolateral RIGHT upper lobe (series 5, image 51) there is a 1.5 x 1.9 x 1.3 cm nodular density abutting the pleural surface. I am suspicious that this may represent a focal infectious/inflammatory process; however, a true pulmonary nodule/neoplasm is not excluded and per Fleischner Society guidelines, in 3 months consider follow-up CT, PET/CT, or biopsy.  -8/8/2024-CT chest without contrast-1.  Enlarging spiculated 2.7 cm pulmonary nodule in the inferior right upper lobe broadly abutting the pleura and minor fissure. This is favored to represent a primary lung neoplasm. Biopsy and staging PET/CT is recommended. 2.  1.0 cm right upper lobe pulmonary nodule with lobulated margins concerning for either a metastatic lesion or second primary lung neoplasm. 3.  Lytic lesion in the left posterolateral third rib, highly suspicious for osseous metastasis. 4.  Multiple small 3 to 4 mm pulmonary nodules are present in both lungs. These are likely infectious/inflammatory in nature though pulmonary metastasis is also in the differential and special attention on follow-up imaging is recommended.  -9/25/2024- CT chest wo contrast-Enlarging 3.3 cm spiculated mass in the inferior right upper lobe broadly abutting the chest wall and minor fissure. This is highly suspicious for a primary lung neoplasm. Enlarging 1.4 cm right upper lobe pulmonary nodule, most likely representing an intrapulmonary metastasis or a second primary lung neoplasm. New right hilar and right paratracheal mediastinal lymphadenopathy, highly concerning for berta spread of neoplasm. Enlarging 16 mm lytic lesion in the left posterior lateral third rib, most likely representing osseous metastasis.  -9/30/2024-flexible fiberoptic bronchoscopy, Ion sensing navigational bronchoscopy, EBUS bronchial wash, transbronchial needle aspirate of mediastinum and lung.  Findings: 2.5 cm paratracheal node, results positive. Final  diagnosis: 1.  Lung, right upper lobe, Ion fine-needle aspiration, smear (1) and cellblock: Positive for malignant cells, consistent with small cell carcinoma. 2.  Station 4R lymph node, endobronchial ultrasound-guided fine-needle aspiration, smear (1) and ThinPrep preparation (1): A.  Positive for malignant cells, consistent with metastatic small cell carcinoma. B.  Background lymphocytes present. 3.  Bronchial washings, ThinPrep preparation (1): A.  Degenerated cells present, suspicious for small cell carcinoma. B.  Ciliated columnar bronchial epithelial cells and macrophages.  AJCC stage: pTX pNX  -10/28/24- PET scan (above)-RUL masses, new and enlarging right hilar, right mediastinal, and right supraclavicular hypermetabolic lymph nodes. Enlarging hypermetabolic expansile lytic lesion involving the left  posterior third rib. 1.5 cm hypermetabolic right adrenal gland nodule, most likely representing metastasis. 2.3 cm hypermetabolic mass in the left paracolic gutter abutting the distal descending colon. Suspect this represents a soft tissue metastatic implant    Complications of Tumor:   Pleurisy  Tumor Status:   10/21/24- Consult Dr. Gallo of [facility]-I recommended the patient continue with Dr. Caldera's recommendation with the use of systemic therapy alone and no radiation therapy at this time   In progress: Palliative carboplatin+etoposide (VP16)+atezolizumab, C1-11/5/24-11/7/24    2..  Normocytic anemia.  Anemia of malignancy/chronic disease and chemotherapy   3.   Thrombocytopenia.  Chemotherapy   4.   COPD  4.  Ex tobacco smoker-20 pack years.  Quit 4 years ago  5.  CKD 3  6.  Hypothyroidism  7.  Paroxysmal atrial fibrillation.  Eliquis maintenance  8.  CAD with remote PTCA small RCA in 2008       9.  Poor overall prognosis     PLAN:   1.  Again reviewed with the patient and apprised of the diagnostic information as outlined above.Labs, 11/5/24-11/18/2024 with glucose 148, aphos 216 (prior: 144-188), GFR 52  (prior karan: 51) otherwise stable CMP, WBC 12, Hgb 10 (prior 11.1) platelets 60,   2.  Chemo tolerance discussed.  Some fatigue and nausea but is otherwise doing well.  Her daughters agree.  3.  Review brain MRI and PET scan (above).  No brain mets.    RUL masses, new and enlarging right hilar, right mediastinal, and right supraclavicular hypermetabolic lymph nodes. Enlarging hypermetabolic expansile lytic lesion involving the left posterior third rib. Right adrenal gland nodule/metastasis. 2.3 cm mass in the left paracolic gutter abutting the distal descending colon, likely met implant  4.  Review consult Dr. Gallo, 10/21/24 (above)-I recommended the patient continue with Dr. Caldera's recommendation with the use of systemic therapy alone and no radiation therapy at this time   5.   Re: Discussed the prognosis of extensive stage disease (likely incurable disease with median survival of 2-4 months untreated, and 7 to 11 months if effectively treated). The option of chemotherapy and atezolizumab, potential toxicities (to include but not limited to: asthenia, mucositis, nausea, emesis, diarrhea, renal failure/nephrotoxicity, ototoxicity, anaphylaxis, vision loss, electrolyte disturbances, hepatotoxicity, neuropathy, seizures, optic neuritis, pulmonary toxicity (fibrosis/pneumonitis), Tucker Jose Antonio syndrome, extravasation necrosis, alopecia, fever/rigors, dizziness, taste changes, hypotension, facial flushing, rash, anorexia, constipation, myelosuppression, immune mediated reactions, pneumonitis, interstitial lung disease, pleural effusion, dyspnea, pulmonary embolism hepatitis, colitis, hypophysitis, hypothyroidism, hyperthyroidism, adrenal insufficiency, type 1 diabetes, pancreatitis, meningoencephalitis, myasthenia gravis, Guillain-Barré syndrome, myocarditis, ocular toxicity, sepsis, severe skin toxicity, severe infusion reactions, hyponatremia, anemia), and benefits (response rate of 60-80%) are also  discussed at length. Questions are answered to their apparent satisfaction.  They are made aware that the goal of therapy is palliative and not curative.  Today, on multiple occasions the patient states that she will not do systemic therapy.      6.  Schedule treatment (plan every 21 days x 4 cycles - then maintenance atezolizumab) to be scheduled C2D1, 12/2/24; C3, 1/6/25,  at [facility]:   Due to borderline PS and advanced age, will dose reduce chemo by 20 % from cycle 1.       Carboplatin AUC 5 IVPB over 30 min D1.   -16 80 mg/m2 IVPB over 120 min D1, D2 and D3.   atezolizumab 1200 mg day 1 IV  Draw CMP on day of chemo.               -   Premedicate with:   Aloxi 0.25 mg IVP, day 1 only.   Decadron 10 mg IVPB over 20-30 min, days 1, 2, and 3.   Emend 150 mg IV day 1 only.               7.  Neulasta 6 mg subcutaneously on days 4 of chemo -[facility].         8. CMP, Mg and CBC weekly on Monday, with Procrit 40,000 units subcutaneously if HGB less than 10 and Hct less than 30; - Neupogen 480 mcg subcutaneously x 3 days if ANC less than 1.0 at [facility].      9.  Rx: Zofran 8 mg p.o. 3 times daily as needed #30 - eRx to pharmacy.                 Norco 5 po q 6 hrs prn # 60     10.  Schedule CT chest, abdomen/pelvis w po/IV contrast, 12/3/24    11.  Continue ongoing management per primary care physician and other specialists.    12.  Return to the office with preoffice CMP and CBC with differential in 5 weeks will schedule surveillance imaging after every 2-4 cycles of therapy.     13.  Importance of Smoking Cessation discussed with patient and informed patient additional information will be on today's AVS.   [place] [facility]'s Flyer - Plan to Be Tobacco Free handout provided to patient        MEDICAL DECISION MAKING: High Complexity   AMOUNT OF DATA: Extensive   RISK OF COMPLICATIONS: Moderate to High      I spent ~65 minutes caring for Alka on this date of service. This time includes time spent by me in the following  activities: preparing for the visit, reviewing tests, performing a medically appropriate examination and/or evaluation, counseling and educating the patient/family/caregiver, ordering medications, tests, or procedures and documenting information in the medical record

## 2024-11-11 LAB
FUNGUS WND CULT: NORMAL
MYCOBACTERIUM SPEC CULT: NORMAL
NIGHT BLUE STAIN TISS: NORMAL
NIGHT BLUE STAIN TISS: NORMAL

## 2024-11-12 ENCOUNTER — HOSPITAL ENCOUNTER (OUTPATIENT)
Dept: INTERVENTIONAL RADIOLOGY/VASCULAR | Facility: HOSPITAL | Age: 89
Discharge: HOME | End: 2024-11-12
Payer: MEDICARE

## 2024-11-12 ENCOUNTER — LAB (OUTPATIENT)
Dept: LAB | Facility: HOSPITAL | Age: 89
End: 2024-11-12
Payer: MEDICARE

## 2024-11-12 VITALS — SYSTOLIC BLOOD PRESSURE: 154 MMHG | HEART RATE: 58 BPM | DIASTOLIC BLOOD PRESSURE: 69 MMHG | OXYGEN SATURATION: 99 %

## 2024-11-12 DIAGNOSIS — C34.81 SMALL CELL CARCINOMA OF OVERLAPPING SITES OF RIGHT LUNG: ICD-10-CM

## 2024-11-12 LAB
BASOPHILS # BLD MANUAL: 0.04 10*3/MM3 (ref 0–0.2)
BASOPHILS NFR BLD MANUAL: 1 % (ref 0–1.5)
DEPRECATED RDW RBC AUTO: 42 FL (ref 37–54)
ELLIPTOCYTES BLD QL SMEAR: ABNORMAL
EOSINOPHIL # BLD MANUAL: 0.04 10*3/MM3 (ref 0–0.4)
EOSINOPHIL NFR BLD MANUAL: 1 % (ref 0.3–6.2)
ERYTHROCYTE [DISTWIDTH] IN BLOOD BY AUTOMATED COUNT: 12.7 % (ref 12.3–15.4)
HCT VFR BLD AUTO: 34.8 % (ref 34–46.6)
HGB BLD-MCNC: 11.1 G/DL (ref 12–15.9)
HOLD SPECIMEN: NORMAL
LYMPHOCYTES # BLD MANUAL: 0.77 10*3/MM3 (ref 0.7–3.1)
LYMPHOCYTES NFR BLD MANUAL: 2 % (ref 5–12)
MCH RBC QN AUTO: 29.1 PG (ref 26.6–33)
MCHC RBC AUTO-ENTMCNC: 31.9 G/DL (ref 31.5–35.7)
MCV RBC AUTO: 91.1 FL (ref 79–97)
MONOCYTES # BLD: 0.09 10*3/MM3 (ref 0.1–0.9)
NEUTROPHILS # BLD AUTO: 3.52 10*3/MM3 (ref 1.7–7)
NEUTROPHILS NFR BLD MANUAL: 78.8 % (ref 42.7–76)
NEUTS VAC BLD QL SMEAR: ABNORMAL
PLATELET # BLD AUTO: 89 10*3/MM3 (ref 140–450)
PMV BLD AUTO: 10 FL (ref 6–12)
POIKILOCYTOSIS BLD QL SMEAR: ABNORMAL
RBC # BLD AUTO: 3.82 10*6/MM3 (ref 3.77–5.28)
SMALL PLATELETS BLD QL SMEAR: ABNORMAL
VARIANT LYMPHS NFR BLD MANUAL: 15.2 % (ref 19.6–45.3)
VARIANT LYMPHS NFR BLD MANUAL: 2 % (ref 0–5)
WBC NRBC COR # BLD AUTO: 4.47 10*3/MM3 (ref 3.4–10.8)

## 2024-11-12 PROCEDURE — 85007 BL SMEAR W/DIFF WBC COUNT: CPT

## 2024-11-12 PROCEDURE — 25510000001 IOPAMIDOL 61 % SOLUTION: Performed by: RADIOLOGY

## 2024-11-12 PROCEDURE — 36598 INJ W/FLUOR EVAL CV DEVICE: CPT

## 2024-11-12 PROCEDURE — 25010000002 HEPARIN LOCK FLUSH PER 10 UNITS: Performed by: RADIOLOGY

## 2024-11-12 PROCEDURE — 36415 COLL VENOUS BLD VENIPUNCTURE: CPT

## 2024-11-12 PROCEDURE — 85025 COMPLETE CBC W/AUTO DIFF WBC: CPT

## 2024-11-12 RX ORDER — HEPARIN SODIUM (PORCINE) LOCK FLUSH IV SOLN 100 UNIT/ML 100 UNIT/ML
500 SOLUTION INTRAVENOUS ONCE
Status: COMPLETED | OUTPATIENT
Start: 2024-11-12 | End: 2024-11-12

## 2024-11-12 RX ORDER — IOPAMIDOL 612 MG/ML
INJECTION, SOLUTION INTRAVASCULAR AS NEEDED
Status: COMPLETED | OUTPATIENT
Start: 2024-11-12 | End: 2024-11-12

## 2024-11-12 RX ADMIN — HEPARIN 500 UNITS: 100 SYRINGE at 09:57

## 2024-11-12 RX ADMIN — IOPAMIDOL 8 ML: 612 INJECTION, SOLUTION INTRAVENOUS at 09:53

## 2024-11-13 NOTE — PROGRESS NOTES
"  [facility] History and Physical  Alka Nicolas  MRN: [id number]  Age: 89 y.o. female   YOB: 1935      SUBJECTIVE  History of Presenting Illness   Patient is a 89 y.o. female with right-sided lung cancer who had previously presented for port placement.  She underwent port placement on 10/29/2024, and the case was generally uncomplicated and without issue.  After the case, infusion nurses did have issues drawing from the port, and she was sent for port study, which revealed free flow of contrast into the SVC and throughout the heart, and confirmed to the catheter is in appropriate location.  She has had no other issues with the port sites, reports that she is overall doing well, and has recovered appropriately from surgery.      Review of Systems   She otherwise denies lightheadedness, dizziness, fainting, passing out, headache, nausea, vomiting, chest pain, palpitations, shortness of breath, coughing, wheezing, heart burn, reflux, skin lesions, unintended weight loss/gain, sensitivity to heat/cold, changes in sensation, changes in vision/hearing/smell/taste, myalgias, arthralgias, and has no other acute complaints or concerning symptoms to report at this time.      Physical Examination   There were no vitals filed for this visit.    Estimated body mass index is 26.66 kg/m² as calculated from the following:    Height as of 11/7/24: 149.9 cm (59\").    Weight as of 11/7/24: 59.9 kg (132 lb).  PREVIOUS WEIGHTS:   Wt Readings from Last 5 Encounters:   11/07/24 59.9 kg (132 lb)   11/06/24 58.5 kg (129 lb)   11/05/24 57.2 kg (126 lb 3.2 oz)   10/28/24 56.4 kg (124 lb 5.4 oz)   10/24/24 56.6 kg (124 lb 12.8 oz)       Physical Examination:  Gen: Elderly female, awake, alert, sitting up in chair in no acute distress  HEENT: NCAT, EOMI, anicteric sclerae  CV: RRR, normotensive  Resp: nonlabored on room air, sats appropriate  Chest: Port site at left chest is healed well, there " is still some surgical adhesive in place there is no evidence of associated infection, including erythema, rubor, or tenderness to palpation.    Neuro: no focal deficits, cranial nerves grossly intact  Psy:  appropriate, cooperative      Data Review     Labs:  CBC  Lab Results   Component Value Date    WBC 4.47 11/12/2024    HGB 11.1 (L) 11/12/2024    HCT 34.8 11/12/2024    PLT 89 (L) 11/12/2024      BMP  Lab Results   Component Value Date     11/05/2024    K 4.5 11/05/2024     11/05/2024    CO2 24.0 11/05/2024    BUN 17 11/05/2024    CREATININE 0.87 11/05/2024    GLUCOSE 96 11/05/2024    CALCIUM 8.9 11/05/2024    MG 2.0 11/05/2024    PHOS 3.0 10/24/2021      LFTs  Lab Results   Component Value Date    PROTEINTOT 5.8 (L) 11/05/2024    ALBUMIN 3.6 11/05/2024    BILITOT 0.6 11/05/2024    ALT 8 11/05/2024    AST 17 11/05/2024    ALKPHOS 162 (H) 11/05/2024      Coag  Lab Results   Component Value Date    PROTIME 15.8 (H) 10/22/2021    PTT 28.1 10/22/2021    INR 1.31 (H) 10/22/2021      Cardiac markers  Lab Results   Component Value Date    CKTOTAL 191 (H) 09/11/2021    TROPONINT 17 (H) 04/10/2024        Urine:  UA  Lab Results   Component Value Date    COLORU Dark Yellow 12/18/2023    CLARITYU Slightly Cloudy (A) 12/18/2023    SPECGRAVUR 1.013 09/10/2021    PHUR 6.0 12/18/2023    PROTEINUA Trace (A) 09/10/2021    GLUCOSEU Negative 07/15/2022    KETONESU 15 mg/dL (A) 12/18/2023    BILIRUBINUR Small (1+) (A) 12/18/2023    BLOODU Negative 07/15/2022    NITRITEU Positive (A) 07/15/2022    LEUKOCYTESUR Small (1+) (A) 12/18/2023    UROBILINOGEN 8 E.U./dL (A) 12/18/2023    WBCUA Too Numerous to Count (A) 09/10/2021    RBCUA 0-2 07/15/2022    BACTERIA 4+ (A) 07/15/2022      Radiology Impressions:  IR Port Study Including Fluoro    Result Date: 11/12/2024  1. Intact port catheter with appropriate forward flow on contrast injection. 2. Partially visualized masslike opacity at the RIGHT lateral lung, which correlates  with prior PET/CT 10/28/2024.  This report was signed and finalized on 11/12/2024 12:24 PM by Dr Nicki Sandoval MD.      XR Chest 1 View    Result Date: 10/29/2024  1. Stable post procedure chest x-ray appearance. 2. Normal appearance of the LEFT subclavian port.    This report was signed and finalized on 10/29/2024 2:29 PM by Dr. Matt Evans MD.      NM PET/CT Skull Base to Mid Thigh    Result Date: 10/28/2024   1.  Enlarged hypermetabolic 3.9 cm subpleural right upper lobe lung mass. Enlarging 1.5 cm hypermetabolic right upper lobe pulmonary nodule. Both of these are likely related to primary lung neoplasm.  2.  New and enlarging right hilar, right mediastinal, and right supraclavicular hypermetabolic lymph nodes, in keeping with berta spread of neoplasm.  3.  Enlarging hypermetabolic expansile lytic lesion involving the left posterior third rib. This is compatible with osseous metastasis.  4.  1.5 cm hypermetabolic right adrenal gland nodule, most likely representing metastasis.  5.  2.3 cm hypermetabolic mass in the left paracolic gutter abutting the distal descending colon. Suspect this represents a soft tissue metastatic implant although given abutment of the colon, a second neoplasm related the colon is also possible.  This report was signed and finalized on 10/28/2024 1:40 PM by Dr. Bhavik Ratliff MD.      MRI Brain With & Without Contrast    Result Date: 10/23/2024  1. No evidence of intracranial metastasis. No abnormal intracranial enhancement. No acute signs of ischemia, hemorrhage, or mass. 2. Nonenhancing hyperintense T2 FLAIR signal changes favoring chronic small vessel ischemia. 3. Small right greater than left mastoid effusions.   This report was signed and finalized on 10/23/2024 11:30 AM by Dr. Ping Stoner MD.         Problem List     Patient Active Problem List   Diagnosis    CAD (coronary artery disease)    Hypertension, benign    Hyperlipidemia    Palpitations    Hypothyroidism  (acquired)    Psoriasis    Vitamin B12 deficiency    Grade 1 out of 6 intensity murmur    Unstable angina    COPD, mild    Osteoarthritis    Pernicious anemia    Acute kidney injury    Hypokalemia    Metabolic acidosis    Acute cystitis    Pneumonitis    Chronic kidney disease, stage IV (severe)    Elevated troponin    Failure to thrive in adult    Moderate malnutrition    Paroxysmal atrial fibrillation    Acute UTI    Upper respiratory tract infection    Acute cough    Multiple lung nodules    Mediastinal adenopathy    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 female with lung cancer who presented for port placement on 10/29/2024.  She is doing well, port is functioning appropriately and function was confirmed with port study, although they have had some issues drawing back.  Okay to continue use of port for infusions.  Patient does not need scheduled follow-up in my clinic, but knows to return to clinic if she is having issues, questions, or concerns.  We also discussed potential port removal in the future, and she will call me or see me again in clinic if she would like to have the port removed in the future.      Smoking cessation: Non-smoker-cessation counseling not indicated  BMI is >= 25 and <30. (Overweight) The following options were offered after discussion;: None-patient will need to maintain weight going through chemotherapy, will need enhanced protein calorie nutrition.  Med rec complete: yes  VTE: VTE PPX is not indicated.    Time Spent: I spent 10 minutes caring for Alka Nicolas on this date of service. This time includes time, independent of any procedures, spent by me in the following activities: preparing for the clinic visit, reviewing tests, obtaining and/or reviewing a separately obtained history, performing a medically appropriate  examination and/or evaluation, counseling and educating the patient/family/caregiver, ordering medications, tests, or procedures, and documenting information in the medical record.     Sean Ibrahim MD  11/13/2024   10:16 CST

## 2024-11-15 ENCOUNTER — OFFICE VISIT (OUTPATIENT)
Dept: SURGERY | Facility: CLINIC | Age: 89
End: 2024-11-15
Payer: MEDICARE

## 2024-11-15 VITALS
DIASTOLIC BLOOD PRESSURE: 68 MMHG | WEIGHT: 132 LBS | HEIGHT: 59 IN | BODY MASS INDEX: 26.61 KG/M2 | OXYGEN SATURATION: 97 % | HEART RATE: 60 BPM | SYSTOLIC BLOOD PRESSURE: 130 MMHG

## 2024-11-15 DIAGNOSIS — C34.81 SMALL CELL CARCINOMA OF OVERLAPPING SITES OF RIGHT LUNG: Primary | ICD-10-CM

## 2024-11-15 DIAGNOSIS — Z45.2 ENCOUNTER FOR CARE RELATED TO VASCULAR ACCESS PORT: Primary | ICD-10-CM

## 2024-11-18 ENCOUNTER — LAB (OUTPATIENT)
Dept: LAB | Facility: HOSPITAL | Age: 89
End: 2024-11-18
Payer: MEDICARE

## 2024-11-18 ENCOUNTER — OFFICE VISIT (OUTPATIENT)
Dept: ONCOLOGY | Facility: CLINIC | Age: 89
End: 2024-11-18
Payer: MEDICARE

## 2024-11-18 VITALS
BODY MASS INDEX: 24.88 KG/M2 | HEIGHT: 59 IN | OXYGEN SATURATION: 96 % | HEART RATE: 55 BPM | DIASTOLIC BLOOD PRESSURE: 68 MMHG | RESPIRATION RATE: 18 BRPM | SYSTOLIC BLOOD PRESSURE: 116 MMHG | TEMPERATURE: 96.7 F | WEIGHT: 123.4 LBS

## 2024-11-18 DIAGNOSIS — C34.81 SMALL CELL CARCINOMA OF OVERLAPPING SITES OF RIGHT LUNG: Primary | ICD-10-CM

## 2024-11-18 LAB
ALBUMIN SERPL-MCNC: 3.7 G/DL (ref 3.5–5.2)
ALBUMIN/GLOB SERPL: 1.7 G/DL
ALP SERPL-CCNC: 216 U/L (ref 39–117)
ALT SERPL W P-5'-P-CCNC: 16 U/L (ref 1–33)
ANION GAP SERPL CALCULATED.3IONS-SCNC: 9 MMOL/L (ref 5–15)
AST SERPL-CCNC: 21 U/L (ref 1–32)
BASOPHILS # BLD MANUAL: 0.12 10*3/MM3 (ref 0–0.2)
BASOPHILS NFR BLD MANUAL: 1 % (ref 0–1.5)
BILIRUB SERPL-MCNC: 0.3 MG/DL (ref 0–1.2)
BUN SERPL-MCNC: 14 MG/DL (ref 8–23)
BUN/CREAT SERPL: 13.7 (ref 7–25)
CALCIUM SPEC-SCNC: 8.4 MG/DL (ref 8.6–10.5)
CHLORIDE SERPL-SCNC: 109 MMOL/L (ref 98–107)
CO2 SERPL-SCNC: 22 MMOL/L (ref 22–29)
CREAT SERPL-MCNC: 1.02 MG/DL (ref 0.57–1)
DEPRECATED RDW RBC AUTO: 42.6 FL (ref 37–54)
EGFRCR SERPLBLD CKD-EPI 2021: 52.7 ML/MIN/1.73
EOSINOPHIL # BLD MANUAL: 0 10*3/MM3 (ref 0–0.4)
EOSINOPHIL NFR BLD MANUAL: 0 % (ref 0.3–6.2)
ERYTHROCYTE [DISTWIDTH] IN BLOOD BY AUTOMATED COUNT: 13 % (ref 12.3–15.4)
GLOBULIN UR ELPH-MCNC: 2.2 GM/DL
GLUCOSE SERPL-MCNC: 148 MG/DL (ref 65–99)
HCT VFR BLD AUTO: 32.4 % (ref 34–46.6)
HGB BLD-MCNC: 10 G/DL (ref 12–15.9)
HOLD SPECIMEN: NORMAL
LYMPHOCYTES # BLD MANUAL: 1.45 10*3/MM3 (ref 0.7–3.1)
LYMPHOCYTES NFR BLD MANUAL: 10 % (ref 5–12)
MAGNESIUM SERPL-MCNC: 1.9 MG/DL (ref 1.6–2.4)
MCH RBC QN AUTO: 28.2 PG (ref 26.6–33)
MCHC RBC AUTO-ENTMCNC: 30.9 G/DL (ref 31.5–35.7)
MCV RBC AUTO: 91.5 FL (ref 79–97)
METAMYELOCYTES NFR BLD MANUAL: 4 % (ref 0–0)
MONOCYTES # BLD: 1.21 10*3/MM3 (ref 0.1–0.9)
MYELOCYTES NFR BLD MANUAL: 2 % (ref 0–0)
NEUTROPHILS # BLD AUTO: 8.33 10*3/MM3 (ref 1.7–7)
NEUTROPHILS NFR BLD MANUAL: 59 % (ref 42.7–76)
NEUTS BAND NFR BLD MANUAL: 10 % (ref 0–5)
NEUTS VAC BLD QL SMEAR: ABNORMAL
NRBC SPEC MANUAL: 1 /100 WBC (ref 0–0.2)
PLATELET # BLD AUTO: 60 10*3/MM3 (ref 140–450)
PMV BLD AUTO: 10.9 FL (ref 6–12)
POIKILOCYTOSIS BLD QL SMEAR: ABNORMAL
POTASSIUM SERPL-SCNC: 3.7 MMOL/L (ref 3.5–5.2)
PROMYELOCYTES NFR BLD MANUAL: 2 % (ref 0–0)
PROT SERPL-MCNC: 5.9 G/DL (ref 6–8.5)
RBC # BLD AUTO: 3.54 10*6/MM3 (ref 3.77–5.28)
SMALL PLATELETS BLD QL SMEAR: ABNORMAL
SODIUM SERPL-SCNC: 140 MMOL/L (ref 136–145)
TOXIC GRANULATION: ABNORMAL
VARIANT LYMPHS NFR BLD MANUAL: 1 % (ref 0–5)
VARIANT LYMPHS NFR BLD MANUAL: 11 % (ref 19.6–45.3)
WBC NRBC COR # BLD AUTO: 12.07 10*3/MM3 (ref 3.4–10.8)

## 2024-11-18 PROCEDURE — 83735 ASSAY OF MAGNESIUM: CPT

## 2024-11-18 PROCEDURE — 1126F AMNT PAIN NOTED NONE PRSNT: CPT | Performed by: INTERNAL MEDICINE

## 2024-11-18 PROCEDURE — 1160F RVW MEDS BY RX/DR IN RCRD: CPT | Performed by: INTERNAL MEDICINE

## 2024-11-18 PROCEDURE — 36415 COLL VENOUS BLD VENIPUNCTURE: CPT

## 2024-11-18 PROCEDURE — 1159F MED LIST DOCD IN RCRD: CPT | Performed by: INTERNAL MEDICINE

## 2024-11-18 PROCEDURE — 85025 COMPLETE CBC W/AUTO DIFF WBC: CPT

## 2024-11-18 PROCEDURE — 85007 BL SMEAR W/DIFF WBC COUNT: CPT

## 2024-11-18 PROCEDURE — 99215 OFFICE O/P EST HI 40 MIN: CPT | Performed by: INTERNAL MEDICINE

## 2024-11-18 PROCEDURE — 80053 COMPREHEN METABOLIC PANEL: CPT

## 2024-11-25 ENCOUNTER — LAB (OUTPATIENT)
Dept: LAB | Facility: HOSPITAL | Age: 89
End: 2024-11-25
Payer: MEDICARE

## 2024-11-25 DIAGNOSIS — C34.81 SMALL CELL CARCINOMA OF OVERLAPPING SITES OF RIGHT LUNG: ICD-10-CM

## 2024-11-25 LAB
ALBUMIN SERPL-MCNC: 3.8 G/DL (ref 3.5–5.2)
ALBUMIN/GLOB SERPL: 1.5 G/DL
ALP SERPL-CCNC: 215 U/L (ref 39–117)
ALT SERPL W P-5'-P-CCNC: 16 U/L (ref 1–33)
ANION GAP SERPL CALCULATED.3IONS-SCNC: 11 MMOL/L (ref 5–15)
AST SERPL-CCNC: 23 U/L (ref 1–32)
BASOPHILS # BLD AUTO: 0.11 10*3/MM3 (ref 0–0.2)
BASOPHILS NFR BLD AUTO: 0.9 % (ref 0–1.5)
BILIRUB SERPL-MCNC: 0.3 MG/DL (ref 0–1.2)
BUN SERPL-MCNC: 16 MG/DL (ref 8–23)
BUN/CREAT SERPL: 16.2 (ref 7–25)
CALCIUM SPEC-SCNC: 8.5 MG/DL (ref 8.6–10.5)
CHLORIDE SERPL-SCNC: 104 MMOL/L (ref 98–107)
CO2 SERPL-SCNC: 21 MMOL/L (ref 22–29)
CREAT SERPL-MCNC: 0.99 MG/DL (ref 0.57–1)
DEPRECATED RDW RBC AUTO: 42.2 FL (ref 37–54)
EGFRCR SERPLBLD CKD-EPI 2021: 54.6 ML/MIN/1.73
EOSINOPHIL # BLD AUTO: 0.01 10*3/MM3 (ref 0–0.4)
EOSINOPHIL NFR BLD AUTO: 0.1 % (ref 0.3–6.2)
ERYTHROCYTE [DISTWIDTH] IN BLOOD BY AUTOMATED COUNT: 13.7 % (ref 12.3–15.4)
GLOBULIN UR ELPH-MCNC: 2.5 GM/DL
GLUCOSE SERPL-MCNC: 152 MG/DL (ref 65–99)
HCT VFR BLD AUTO: 34.9 % (ref 34–46.6)
HGB BLD-MCNC: 11.1 G/DL (ref 12–15.9)
IMM GRANULOCYTES # BLD AUTO: 0.58 10*3/MM3 (ref 0–0.05)
IMM GRANULOCYTES NFR BLD AUTO: 4.6 % (ref 0–0.5)
LYMPHOCYTES # BLD AUTO: 1.29 10*3/MM3 (ref 0.7–3.1)
LYMPHOCYTES NFR BLD AUTO: 10.2 % (ref 19.6–45.3)
MAGNESIUM SERPL-MCNC: 1.9 MG/DL (ref 1.6–2.4)
MCH RBC QN AUTO: 29 PG (ref 26.6–33)
MCHC RBC AUTO-ENTMCNC: 31.8 G/DL (ref 31.5–35.7)
MCV RBC AUTO: 91.1 FL (ref 79–97)
MONOCYTES # BLD AUTO: 0.69 10*3/MM3 (ref 0.1–0.9)
MONOCYTES NFR BLD AUTO: 5.5 % (ref 5–12)
NEUTROPHILS NFR BLD AUTO: 78.7 % (ref 42.7–76)
NEUTROPHILS NFR BLD AUTO: 9.98 10*3/MM3 (ref 1.7–7)
NRBC BLD AUTO-RTO: 0 /100 WBC (ref 0–0.2)
PLATELET # BLD AUTO: 172 10*3/MM3 (ref 140–450)
PMV BLD AUTO: 10.1 FL (ref 6–12)
POTASSIUM SERPL-SCNC: 4.1 MMOL/L (ref 3.5–5.2)
PROT SERPL-MCNC: 6.3 G/DL (ref 6–8.5)
RBC # BLD AUTO: 3.83 10*6/MM3 (ref 3.77–5.28)
SODIUM SERPL-SCNC: 136 MMOL/L (ref 136–145)
WBC NRBC COR # BLD AUTO: 12.66 10*3/MM3 (ref 3.4–10.8)

## 2024-11-25 PROCEDURE — 36415 COLL VENOUS BLD VENIPUNCTURE: CPT

## 2024-11-25 PROCEDURE — 83735 ASSAY OF MAGNESIUM: CPT

## 2024-11-25 PROCEDURE — 80053 COMPREHEN METABOLIC PANEL: CPT

## 2024-11-25 PROCEDURE — 85025 COMPLETE CBC W/AUTO DIFF WBC: CPT

## 2024-12-02 ENCOUNTER — TELEPHONE (OUTPATIENT)
Dept: ONCOLOGY | Facility: CLINIC | Age: 89
End: 2024-12-02
Payer: MEDICARE

## 2024-12-02 ENCOUNTER — LAB (OUTPATIENT)
Dept: LAB | Facility: HOSPITAL | Age: 89
End: 2024-12-02
Payer: MEDICARE

## 2024-12-02 ENCOUNTER — INFUSION (OUTPATIENT)
Dept: ONCOLOGY | Facility: HOSPITAL | Age: 89
End: 2024-12-02
Payer: MEDICARE

## 2024-12-02 VITALS
HEART RATE: 68 BPM | OXYGEN SATURATION: 100 % | HEIGHT: 59 IN | WEIGHT: 122.2 LBS | SYSTOLIC BLOOD PRESSURE: 109 MMHG | DIASTOLIC BLOOD PRESSURE: 50 MMHG | BODY MASS INDEX: 24.64 KG/M2 | TEMPERATURE: 97 F | RESPIRATION RATE: 18 BRPM

## 2024-12-02 DIAGNOSIS — C34.81 SMALL CELL CARCINOMA OF OVERLAPPING SITES OF RIGHT LUNG: Primary | ICD-10-CM

## 2024-12-02 DIAGNOSIS — N17.9 ACUTE KIDNEY INJURY: ICD-10-CM

## 2024-12-02 DIAGNOSIS — C34.81 SMALL CELL CARCINOMA OF OVERLAPPING SITES OF RIGHT LUNG: ICD-10-CM

## 2024-12-02 LAB
ALBUMIN SERPL-MCNC: 3.5 G/DL (ref 3.5–5.2)
ALBUMIN/GLOB SERPL: 1.5 G/DL
ALP SERPL-CCNC: 181 U/L (ref 39–117)
ALT SERPL W P-5'-P-CCNC: 17 U/L (ref 1–33)
ANION GAP SERPL CALCULATED.3IONS-SCNC: 13 MMOL/L (ref 5–15)
AST SERPL-CCNC: 31 U/L (ref 1–32)
BASOPHILS # BLD AUTO: 0.08 10*3/MM3 (ref 0–0.2)
BASOPHILS NFR BLD AUTO: 1.5 % (ref 0–1.5)
BILIRUB SERPL-MCNC: 1 MG/DL (ref 0–1.2)
BUN SERPL-MCNC: 37 MG/DL (ref 8–23)
BUN/CREAT SERPL: 22.2 (ref 7–25)
CALCIUM SPEC-SCNC: 8.2 MG/DL (ref 8.6–10.5)
CHLORIDE SERPL-SCNC: 106 MMOL/L (ref 98–107)
CO2 SERPL-SCNC: 17 MMOL/L (ref 22–29)
CREAT SERPL-MCNC: 1.67 MG/DL (ref 0.57–1)
DEPRECATED RDW RBC AUTO: 47.9 FL (ref 37–54)
EGFRCR SERPLBLD CKD-EPI 2021: 29.2 ML/MIN/1.73
EOSINOPHIL # BLD AUTO: 0.01 10*3/MM3 (ref 0–0.4)
EOSINOPHIL NFR BLD AUTO: 0.2 % (ref 0.3–6.2)
ERYTHROCYTE [DISTWIDTH] IN BLOOD BY AUTOMATED COUNT: 14.7 % (ref 12.3–15.4)
GLOBULIN UR ELPH-MCNC: 2.4 GM/DL
GLUCOSE SERPL-MCNC: 105 MG/DL (ref 65–99)
HCT VFR BLD AUTO: 33.1 % (ref 34–46.6)
HGB BLD-MCNC: 10.6 G/DL (ref 12–15.9)
IMM GRANULOCYTES # BLD AUTO: 0.05 10*3/MM3 (ref 0–0.05)
IMM GRANULOCYTES NFR BLD AUTO: 0.9 % (ref 0–0.5)
LYMPHOCYTES # BLD AUTO: 0.47 10*3/MM3 (ref 0.7–3.1)
LYMPHOCYTES NFR BLD AUTO: 8.5 % (ref 19.6–45.3)
MAGNESIUM SERPL-MCNC: 2 MG/DL (ref 1.6–2.4)
MCH RBC QN AUTO: 28.8 PG (ref 26.6–33)
MCHC RBC AUTO-ENTMCNC: 32 G/DL (ref 31.5–35.7)
MCV RBC AUTO: 89.9 FL (ref 79–97)
MONOCYTES # BLD AUTO: 0.89 10*3/MM3 (ref 0.1–0.9)
MONOCYTES NFR BLD AUTO: 16.2 % (ref 5–12)
NEUTROPHILS NFR BLD AUTO: 4 10*3/MM3 (ref 1.7–7)
NEUTROPHILS NFR BLD AUTO: 72.7 % (ref 42.7–76)
NRBC BLD AUTO-RTO: 0 /100 WBC (ref 0–0.2)
PLATELET # BLD AUTO: 175 10*3/MM3 (ref 140–450)
PMV BLD AUTO: 10.3 FL (ref 6–12)
POTASSIUM SERPL-SCNC: 4.4 MMOL/L (ref 3.5–5.2)
PROT SERPL-MCNC: 5.9 G/DL (ref 6–8.5)
RBC # BLD AUTO: 3.68 10*6/MM3 (ref 3.77–5.28)
SODIUM SERPL-SCNC: 136 MMOL/L (ref 136–145)
WBC NRBC COR # BLD AUTO: 5.5 10*3/MM3 (ref 3.4–10.8)

## 2024-12-02 PROCEDURE — 25010000002 HEPARIN LOCK FLUSH PER 10 UNITS: Performed by: INTERNAL MEDICINE

## 2024-12-02 PROCEDURE — 80053 COMPREHEN METABOLIC PANEL: CPT

## 2024-12-02 PROCEDURE — 83735 ASSAY OF MAGNESIUM: CPT

## 2024-12-02 PROCEDURE — 96361 HYDRATE IV INFUSION ADD-ON: CPT

## 2024-12-02 PROCEDURE — 36415 COLL VENOUS BLD VENIPUNCTURE: CPT

## 2024-12-02 PROCEDURE — 85025 COMPLETE CBC W/AUTO DIFF WBC: CPT

## 2024-12-02 PROCEDURE — 25810000003 SODIUM CHLORIDE 0.9 % SOLUTION: Performed by: INTERNAL MEDICINE

## 2024-12-02 PROCEDURE — 96360 HYDRATION IV INFUSION INIT: CPT

## 2024-12-02 RX ORDER — SODIUM CHLORIDE 0.9 % (FLUSH) 0.9 %
10 SYRINGE (ML) INJECTION AS NEEDED
Status: DISCONTINUED | OUTPATIENT
Start: 2024-12-02 | End: 2024-12-02 | Stop reason: HOSPADM

## 2024-12-02 RX ORDER — HEPARIN SODIUM (PORCINE) LOCK FLUSH IV SOLN 100 UNIT/ML 100 UNIT/ML
500 SOLUTION INTRAVENOUS AS NEEDED
Status: DISCONTINUED | OUTPATIENT
Start: 2024-12-02 | End: 2024-12-02 | Stop reason: HOSPADM

## 2024-12-02 RX ORDER — SODIUM CHLORIDE 0.9 % (FLUSH) 0.9 %
10 SYRINGE (ML) INJECTION AS NEEDED
Status: CANCELLED | OUTPATIENT
Start: 2024-12-02

## 2024-12-02 RX ORDER — HEPARIN SODIUM (PORCINE) LOCK FLUSH IV SOLN 100 UNIT/ML 100 UNIT/ML
500 SOLUTION INTRAVENOUS AS NEEDED
Status: CANCELLED | OUTPATIENT
Start: 2024-12-02

## 2024-12-02 RX ADMIN — Medication 10 ML: at 10:47

## 2024-12-02 RX ADMIN — SODIUM CHLORIDE 1000 ML: 9 INJECTION, SOLUTION INTRAVENOUS at 08:50

## 2024-12-02 RX ADMIN — HEPARIN 500 UNITS: 100 SYRINGE at 10:47

## 2024-12-02 NOTE — TELEPHONE ENCOUNTER
----- Message from Vance Davis sent at 12/2/2024  8:16 AM CST -----  Hydrate 1 liter ns daily x 2 then repeat BMP on wed. Hold chemo if due  ----- Message -----  From: Lab, Background User  Sent: 12/2/2024   7:45 AM CST  To: Vance Davis MD

## 2024-12-02 NOTE — TELEPHONE ENCOUNTER
Caller:HAMILTON PARKER (ON  [place])    Relationship:     Best call back number: [phone]    What was the call regarding: PLEASE CALL HER REGARDING PATIENT'S INFUSION THAT IS SCHEDULED ON 12/12/2024. NEEDS TO DISCUSS THE POD AND IT BEING REMOVED BEFORE HER CT SCAN.

## 2024-12-02 NOTE — PROGRESS NOTES
Per Dr. Spear, Hydrate 1 L normal saline daily x 2 then repeat BMP on Wednesday.  Hold chemo.  Move out CT a week. Office calling scheduling to move CT.

## 2024-12-02 NOTE — PROGRESS NOTES
[id number] Contacted Mana with MD office r/t labs, GFR 29. Pt recently had UTI and finished abx yesterday. Still c/o back/flank pain. Had shivers yesterday but unknown on  fever, also more confused the past 3 days. Per Dr Davis hold treatment and move out x1 week. 1 L NS today and tomorrow and re-check BMP after infusion Wednesday. Also move CT out x1 week. Pt daughter v/kerwin Wong RN

## 2024-12-02 NOTE — TELEPHONE ENCOUNTER
Called and spoke with patient's daughter Genesis. I let her know the CT scan has been moved to 12/12/24 at [facility] Entrance at 2:00 pm. NPO 6 hours before. She asked if it could be done at [facility]. I let her know that she could call to check and gave her the number to scheduling.

## 2024-12-03 ENCOUNTER — INFUSION (OUTPATIENT)
Dept: ONCOLOGY | Facility: HOSPITAL | Age: 89
End: 2024-12-03
Payer: MEDICARE

## 2024-12-03 VITALS
HEART RATE: 73 BPM | TEMPERATURE: 99 F | DIASTOLIC BLOOD PRESSURE: 61 MMHG | HEIGHT: 59 IN | OXYGEN SATURATION: 97 % | SYSTOLIC BLOOD PRESSURE: 101 MMHG | WEIGHT: 122 LBS | BODY MASS INDEX: 24.6 KG/M2 | RESPIRATION RATE: 17 BRPM

## 2024-12-03 DIAGNOSIS — N17.9 ACUTE KIDNEY INJURY: Primary | ICD-10-CM

## 2024-12-03 DIAGNOSIS — C34.81 SMALL CELL CARCINOMA OF OVERLAPPING SITES OF RIGHT LUNG: ICD-10-CM

## 2024-12-03 PROCEDURE — 96360 HYDRATION IV INFUSION INIT: CPT

## 2024-12-03 PROCEDURE — 25010000002 HEPARIN LOCK FLUSH PER 10 UNITS: Performed by: INTERNAL MEDICINE

## 2024-12-03 PROCEDURE — 25810000003 SODIUM CHLORIDE 0.9 % SOLUTION: Performed by: INTERNAL MEDICINE

## 2024-12-03 PROCEDURE — 96361 HYDRATE IV INFUSION ADD-ON: CPT

## 2024-12-03 RX ORDER — HEPARIN SODIUM (PORCINE) LOCK FLUSH IV SOLN 100 UNIT/ML 100 UNIT/ML
500 SOLUTION INTRAVENOUS AS NEEDED
Status: DISCONTINUED | OUTPATIENT
Start: 2024-12-03 | End: 2024-12-03 | Stop reason: HOSPADM

## 2024-12-03 RX ORDER — HEPARIN SODIUM (PORCINE) LOCK FLUSH IV SOLN 100 UNIT/ML 100 UNIT/ML
500 SOLUTION INTRAVENOUS AS NEEDED
Status: CANCELLED | OUTPATIENT
Start: 2024-12-03

## 2024-12-03 RX ORDER — SODIUM CHLORIDE 0.9 % (FLUSH) 0.9 %
10 SYRINGE (ML) INJECTION AS NEEDED
Status: DISCONTINUED | OUTPATIENT
Start: 2024-12-03 | End: 2024-12-03 | Stop reason: HOSPADM

## 2024-12-03 RX ORDER — SODIUM CHLORIDE 0.9 % (FLUSH) 0.9 %
10 SYRINGE (ML) INJECTION AS NEEDED
Status: CANCELLED | OUTPATIENT
Start: 2024-12-03

## 2024-12-03 RX ADMIN — Medication 10 ML: at 12:01

## 2024-12-03 RX ADMIN — HEPARIN 500 UNITS: 100 SYRINGE at 12:01

## 2024-12-03 RX ADMIN — SODIUM CHLORIDE 1000 ML: 9 INJECTION, SOLUTION INTRAVENOUS at 09:55

## 2024-12-04 ENCOUNTER — LAB (OUTPATIENT)
Dept: LAB | Facility: HOSPITAL | Age: 89
End: 2024-12-04
Payer: MEDICARE

## 2024-12-04 ENCOUNTER — INFUSION (OUTPATIENT)
Dept: ONCOLOGY | Facility: HOSPITAL | Age: 89
End: 2024-12-04
Payer: MEDICARE

## 2024-12-04 ENCOUNTER — TELEPHONE (OUTPATIENT)
Dept: ONCOLOGY | Facility: CLINIC | Age: 89
End: 2024-12-04
Payer: MEDICARE

## 2024-12-04 ENCOUNTER — NURSE TRIAGE (OUTPATIENT)
Dept: CALL CENTER | Facility: HOSPITAL | Age: 89
End: 2024-12-04
Payer: MEDICARE

## 2024-12-04 VITALS
SYSTOLIC BLOOD PRESSURE: 123 MMHG | HEART RATE: 69 BPM | TEMPERATURE: 97.9 F | BODY MASS INDEX: 25.32 KG/M2 | DIASTOLIC BLOOD PRESSURE: 49 MMHG | HEIGHT: 59 IN | WEIGHT: 125.6 LBS | OXYGEN SATURATION: 100 % | RESPIRATION RATE: 20 BRPM

## 2024-12-04 DIAGNOSIS — N17.9 ACUTE KIDNEY INJURY: ICD-10-CM

## 2024-12-04 DIAGNOSIS — C34.81 SMALL CELL CARCINOMA OF OVERLAPPING SITES OF RIGHT LUNG: Primary | ICD-10-CM

## 2024-12-04 DIAGNOSIS — C34.81 SMALL CELL CARCINOMA OF OVERLAPPING SITES OF RIGHT LUNG: ICD-10-CM

## 2024-12-04 LAB
ALBUMIN SERPL-MCNC: 3.2 G/DL (ref 3.5–5.2)
ALBUMIN/GLOB SERPL: 1.5 G/DL
ALP SERPL-CCNC: 173 U/L (ref 39–117)
ALT SERPL W P-5'-P-CCNC: 34 U/L (ref 1–33)
ANION GAP SERPL CALCULATED.3IONS-SCNC: 13 MMOL/L (ref 5–15)
AST SERPL-CCNC: 60 U/L (ref 1–32)
BASOPHILS # BLD AUTO: 0.02 10*3/MM3 (ref 0–0.2)
BASOPHILS NFR BLD AUTO: 0.5 % (ref 0–1.5)
BILIRUB SERPL-MCNC: 0.7 MG/DL (ref 0–1.2)
BUN SERPL-MCNC: 42 MG/DL (ref 8–23)
BUN/CREAT SERPL: 18.5 (ref 7–25)
CALCIUM SPEC-SCNC: 7.8 MG/DL (ref 8.6–10.5)
CHLORIDE SERPL-SCNC: 105 MMOL/L (ref 98–107)
CO2 SERPL-SCNC: 15 MMOL/L (ref 22–29)
CREAT SERPL-MCNC: 2.27 MG/DL (ref 0.57–1)
DEPRECATED RDW RBC AUTO: 47.6 FL (ref 37–54)
EGFRCR SERPLBLD CKD-EPI 2021: 20.2 ML/MIN/1.73
EOSINOPHIL # BLD AUTO: 0 10*3/MM3 (ref 0–0.4)
EOSINOPHIL NFR BLD AUTO: 0 % (ref 0.3–6.2)
ERYTHROCYTE [DISTWIDTH] IN BLOOD BY AUTOMATED COUNT: 14.8 % (ref 12.3–15.4)
GLOBULIN UR ELPH-MCNC: 2.1 GM/DL
GLUCOSE SERPL-MCNC: 103 MG/DL (ref 65–99)
HCT VFR BLD AUTO: 29.6 % (ref 34–46.6)
HGB BLD-MCNC: 9.5 G/DL (ref 12–15.9)
IMM GRANULOCYTES # BLD AUTO: 0.03 10*3/MM3 (ref 0–0.05)
IMM GRANULOCYTES NFR BLD AUTO: 0.7 % (ref 0–0.5)
LYMPHOCYTES # BLD AUTO: 0.56 10*3/MM3 (ref 0.7–3.1)
LYMPHOCYTES NFR BLD AUTO: 13 % (ref 19.6–45.3)
MAGNESIUM SERPL-MCNC: 1.8 MG/DL (ref 1.6–2.4)
MCH RBC QN AUTO: 28.6 PG (ref 26.6–33)
MCHC RBC AUTO-ENTMCNC: 32.1 G/DL (ref 31.5–35.7)
MCV RBC AUTO: 89.2 FL (ref 79–97)
MONOCYTES # BLD AUTO: 0.55 10*3/MM3 (ref 0.1–0.9)
MONOCYTES NFR BLD AUTO: 12.8 % (ref 5–12)
NEUTROPHILS NFR BLD AUTO: 3.14 10*3/MM3 (ref 1.7–7)
NEUTROPHILS NFR BLD AUTO: 73 % (ref 42.7–76)
NRBC BLD AUTO-RTO: 0 /100 WBC (ref 0–0.2)
PLATELET # BLD AUTO: 138 10*3/MM3 (ref 140–450)
PMV BLD AUTO: 10.6 FL (ref 6–12)
POTASSIUM SERPL-SCNC: 3.8 MMOL/L (ref 3.5–5.2)
PROT SERPL-MCNC: 5.3 G/DL (ref 6–8.5)
RBC # BLD AUTO: 3.32 10*6/MM3 (ref 3.77–5.28)
SODIUM SERPL-SCNC: 133 MMOL/L (ref 136–145)
WBC NRBC COR # BLD AUTO: 4.3 10*3/MM3 (ref 3.4–10.8)

## 2024-12-04 PROCEDURE — 80053 COMPREHEN METABOLIC PANEL: CPT

## 2024-12-04 PROCEDURE — 85025 COMPLETE CBC W/AUTO DIFF WBC: CPT

## 2024-12-04 PROCEDURE — 83735 ASSAY OF MAGNESIUM: CPT

## 2024-12-04 PROCEDURE — 25810000003 SODIUM CHLORIDE 0.9 % SOLUTION: Performed by: INTERNAL MEDICINE

## 2024-12-04 PROCEDURE — 25010000002 HEPARIN LOCK FLUSH PER 10 UNITS: Performed by: INTERNAL MEDICINE

## 2024-12-04 PROCEDURE — 96361 HYDRATE IV INFUSION ADD-ON: CPT

## 2024-12-04 PROCEDURE — 36415 COLL VENOUS BLD VENIPUNCTURE: CPT

## 2024-12-04 PROCEDURE — 96360 HYDRATION IV INFUSION INIT: CPT

## 2024-12-04 RX ORDER — SODIUM CHLORIDE 0.9 % (FLUSH) 0.9 %
10 SYRINGE (ML) INJECTION AS NEEDED
OUTPATIENT
Start: 2024-12-04

## 2024-12-04 RX ORDER — SODIUM CHLORIDE 0.9 % (FLUSH) 0.9 %
10 SYRINGE (ML) INJECTION AS NEEDED
Status: DISCONTINUED | OUTPATIENT
Start: 2024-12-04 | End: 2024-12-04 | Stop reason: HOSPADM

## 2024-12-04 RX ORDER — HEPARIN SODIUM (PORCINE) LOCK FLUSH IV SOLN 100 UNIT/ML 100 UNIT/ML
500 SOLUTION INTRAVENOUS AS NEEDED
OUTPATIENT
Start: 2024-12-04

## 2024-12-04 RX ORDER — HEPARIN SODIUM (PORCINE) LOCK FLUSH IV SOLN 100 UNIT/ML 100 UNIT/ML
500 SOLUTION INTRAVENOUS AS NEEDED
Status: DISCONTINUED | OUTPATIENT
Start: 2024-12-04 | End: 2024-12-04 | Stop reason: HOSPADM

## 2024-12-04 RX ADMIN — SODIUM CHLORIDE 1000 ML: 9 INJECTION, SOLUTION INTRAVENOUS at 11:02

## 2024-12-04 RX ADMIN — HEPARIN 500 UNITS: 100 SYRINGE at 13:12

## 2024-12-04 RX ADMIN — Medication 10 ML: at 13:12

## 2024-12-04 NOTE — TELEPHONE ENCOUNTER
"She is home has been receiving IV fluids at the cancer center- There is worry about the antibiotic being hard on the kidney. She would like a phone call per Dr. Gardner.  The call will be routed to Dr. Gardner for him to return phone call to the Daughter.   Reason for Disposition   [1] Caller requests to speak ONLY to PCP AND [2] URGENT question    Additional Information   Negative: Lab calling with strep throat test results and triager can call in prescription   Negative: Lab calling with urinalysis test results and triager can call in prescription   Negative: Medication questions   Negative: Medication renewal and refill questions   Negative: Pre-operative or pre-procedural questions   Negative: ED call to PCP (i.e., primary care provider; doctor, NP, or PA)   Negative: Doctor (or NP/PA) call to PCP   Negative: Call about patient who is currently hospitalized   Negative: Lab or radiology calling with CRITICAL test results   Negative: [1] Follow-up call from patient regarding patient's clinical status AND [2] information urgent    Answer Assessment - Initial Assessment Questions  1. REASON FOR CALL or QUESTION: \"What is your reason for calling today?\" or \"How can I best  help you?\" or \"What question do you have that I can help answer?\"      To speak with Dr. Gardner privately.   2. CALLER: Document the source of call. (e.g., laboratory, patient).      Family member.    Protocols used: PCP Call - No Triage-ADULT-    "

## 2024-12-04 NOTE — TELEPHONE ENCOUNTER
----- Message from Vance Davis sent at 12/4/2024 10:41 AM CST -----  Hydrate 1 L normal saline today.  Hold chemo this week.  Recheck labs next week  ----- Message -----  From: Lab, Background User  Sent: 12/4/2024  10:11 AM CST  To: Vance Davis MD

## 2024-12-10 PROBLEM — E87.1 HYPONATREMIA: Status: ACTIVE | Noted: 2024-01-01

## 2024-12-10 PROBLEM — R74.01 TRANSAMINITIS: Status: ACTIVE | Noted: 2024-01-01

## 2024-12-10 PROBLEM — E80.6 HYPERBILIRUBINEMIA: Status: ACTIVE | Noted: 2024-01-01

## 2024-12-10 PROBLEM — N17.9 ACUTE-ON-CHRONIC KIDNEY INJURY: Status: ACTIVE | Noted: 2024-01-01

## 2024-12-10 PROBLEM — D63.8 ANEMIA OF CHRONIC DISEASE: Status: ACTIVE | Noted: 2024-01-01

## 2024-12-10 PROBLEM — N18.31 ACUTE RENAL FAILURE SUPERIMPOSED ON STAGE 3A CHRONIC KIDNEY DISEASE: Status: ACTIVE | Noted: 2021-09-10

## 2024-12-10 PROBLEM — E83.51 HYPOCALCEMIA: Status: ACTIVE | Noted: 2024-01-01

## 2024-12-10 PROBLEM — N18.4 ACUTE RENAL FAILURE SUPERIMPOSED ON STAGE 4 CHRONIC KIDNEY DISEASE: Status: ACTIVE | Noted: 2021-09-10

## 2024-12-10 PROBLEM — N18.9 ACUTE-ON-CHRONIC KIDNEY INJURY: Status: ACTIVE | Noted: 2024-01-01

## 2024-12-10 NOTE — TELEPHONE ENCOUNTER
----- Message from Vance Davis sent at 12/10/2024  8:03 AM CST -----  Procrit as per protocol  ----- Message -----  From: Lab, Background User  Sent: 12/10/2024   8:00 AM CST  To: Vance Davis MD

## 2024-12-10 NOTE — PROGRESS NOTES
Dr. Davis's office notifiied to review patients labs many abnormals, patient c/o SOA, weakness, dizziness, BP is dropping when stands, todays BP machine 93/35 and manual 94/50. Dr. Davis instructed send patient to ER. Report called to Cleo PIMENTEL. Mary Tuttle RN

## 2024-12-10 NOTE — ED PROVIDER NOTES
Subjective   History of Present Illness   Alka Nicolas is an 89 yoF who has history of small cell carcinoma: stage: IV (cT3, cN2, M1), extensive stage.   She has opted to receive palliative systemic therapy: Carboplatin+etoposide (VP16)+atezolizumab, C1-11/5/24-11/7/24.  Patient is getting.  Chemotherapy abnormal labs at the doctor's office into the ED for evaluation progressive worsening renal length function.  With elevated T. bili    Abnormal Lab  Location:  Abnormal labs i.e. renal functions.  Severity:  Moderate  Onset quality:  Gradual  Timing:  Constant  Progression:  Worsening  Chronicity:  New  Associated symptoms: no abdominal pain, no chest pain, no congestion, no cough, no diarrhea, no fever, no headaches, no loss of consciousness, no myalgias, no rhinorrhea, no shortness of breath, no sore throat and no wheezing        Review of Systems   Constitutional: Negative.  Negative for fever.   HENT: Negative.  Negative for congestion, rhinorrhea and sore throat.    Eyes: Negative.    Respiratory: Negative.  Negative for cough, shortness of breath and wheezing.    Cardiovascular: Negative.  Negative for chest pain.   Gastrointestinal: Negative.  Negative for abdominal pain and diarrhea.   Musculoskeletal: Negative.  Negative for back pain, myalgias and neck pain.   Skin: Negative.    Neurological: Negative.  Negative for loss of consciousness and headaches.   All other systems reviewed and are negative.      Past Medical History:   Diagnosis Date    Abdominal aortic aneurysm     Angina pectoris     Atherosclerosis of native coronary artery of native heart without angina pectoris     CAD (coronary artery disease)     Esophageal reflux     GERD (gastroesophageal reflux disease)     History of PTCA     Hyperlipidemia     Hypertension     Hypertension, essential, benign     Lung cancer     Macular degeneration disease     Thyroid disease     Tobacco use disorder        No Known Allergies    Past Surgical History:    Procedure Laterality Date    APPENDECTOMY      BRONCHOSCOPY N/A 2024    Procedure: BRONCHOSCOPY WITH ENDOBRONCHIAL ULTRASOUND;  Surgeon: Lucas Johnson MD;  Location:  [facility] OR;  Service: Pulmonary;  Laterality: N/A;  preop; lung nodule   postop lung nodules   UMAIR Gardner    BRONCHOSCOPY WITH ION ROBOTIC ASSIST N/A 2024    Procedure: BRONCHOSCOPY WITH ION ROBOT and BRONCHOSCOPY WITH ENDOBRONCHIAL ULTRASOUND;  Surgeon: Lucas Johnson MD;  Location:  [facility] OR;  Service: Robotics - Pulmonary;  Laterality: N/A;  preop; lung nodules  postop lung nodules   PCP Roe Gardner    CARDIAC CATHETERIZATION      CARDIAC CATHETERIZATION Left 10/21/2020    Procedure: Cardiac Catheterization/Vascular Study;  Surgeon: Marcos Ratliff MD;  Location:  [facility] CATH INVASIVE LOCATION;  Service: Cardiology;  Laterality: Left;    CHOLECYSTECTOMY      HYSTERECTOMY      total    VENOUS ACCESS DEVICE (PORT) INSERTION N/A 10/29/2024    Procedure: Single Lumen Port-a-cath insertion with flouroscopy;  Surgeon: Sean Ibrahim MD;  Location:  [facility] OR;  Service: General;  Laterality: N/A;       Family History   Problem Relation Age of Onset    Diabetes Mother     Heart disease Father     Hyperlipidemia Father     Hypertension Father     Stroke Father     Breast cancer Neg Hx        Social History     Socioeconomic History    Marital status:    Tobacco Use    Smoking status: Former     Current packs/day: 0.00     Types: Cigarettes     Quit date: 2020     Years since quittin.6    Smokeless tobacco: Never   Vaping Use    Vaping status: Never Used   Substance and Sexual Activity    Alcohol use: No    Drug use: No    Sexual activity: Not Currently     Partners: Male           Objective   Physical Exam  Vitals and nursing note reviewed. Exam conducted with a chaperone present.   Constitutional:       General: She is awake.      Appearance: Normal appearance. She is well-developed. She is  ill-appearing.   HENT:      Head: Normocephalic and atraumatic.   Eyes:      General: Lids are normal.      Conjunctiva/sclera: Conjunctivae normal.      Pupils: Pupils are equal, round, and reactive to light.   Cardiovascular:      Rate and Rhythm: Normal rate and regular rhythm.      Chest Wall: PMI is not displaced.      Pulses: Normal pulses.      Heart sounds: Normal heart sounds.   Pulmonary:      Effort: Pulmonary effort is normal.      Breath sounds: Normal breath sounds. No decreased breath sounds.   Abdominal:      General: Abdomen is flat and protuberant. Bowel sounds are normal.      Palpations: Abdomen is soft. There is no shifting dullness or hepatomegaly.      Tenderness: There is no abdominal tenderness. There is no right CVA tenderness or left CVA tenderness.   Musculoskeletal:         General: Normal range of motion.      Cervical back: Full passive range of motion without pain, normal range of motion and neck supple.   Skin:     General: Skin is warm and dry.      Capillary Refill: Capillary refill takes less than 2 seconds.      Coloration: Skin is pale.   Neurological:      General: No focal deficit present.      Mental Status: She is alert and oriented to person, place, and time.      Motor: Motor function is intact.      Deep Tendon Reflexes: Reflexes are normal and symmetric.   Psychiatric:         Behavior: Behavior is cooperative.         Procedures           ED Course  ED Course as of 12/10/24 1154   Tue Dec 10, 2024   1151 Patient came into the ED with worsening renal insufficiency was placed on sodium.  And sodium bicarb infusion.  CT is negative for any obstructive lesions.  I have discussed this case with the patient family they want the patient to be comfortable patient will be admitted. [TS]      ED Course User Index  [TS] Lee Riggins MD                                                       Medical Decision Making  Abnormal kidney function with hyponatremia and renal insufficiency  with abnormal lab workup i.e. AST ALT T. bili and alk phos.  Will place on normal saline and give isotonic sodium bicarbonate infusion which will help with the sodium also.    Problems Addressed:  Acute kidney injury superimposed on chronic kidney disease: complicated acute illness or injury  Hypokalemia: complicated acute illness or injury  Hyponatremia: complicated acute illness or injury  Malignant neoplasm of lung, unspecified laterality, unspecified part of lung: complicated acute illness or injury  Total bilirubin, elevated: complicated acute illness or injury  Transaminitis: complicated acute illness or injury    Amount and/or Complexity of Data Reviewed  Radiology: ordered.     Details: CT scan was reviewed  Discussion of management or test interpretation with external provider(s): Discussed with the hospitalist ARN    Risk  Prescription drug management.  Decision regarding hospitalization.  Risk Details: Show metastic lung cancer with elevated T. bili and chronically transaminitis patient CT is negative for obstructive lesions.  Has been to be admitted potassium replacement sodium replacement and IV hydration for renal failure.  Patient does not have any clinical evidence of sepsis.  Blood pressure low because of acute on chronic renal insufficiency.  The family wants the patient to be comfortable but normal anything aggressive.        Final diagnoses:   Acute kidney injury superimposed on chronic kidney disease   Hyponatremia   Hypokalemia   Transaminitis   Total bilirubin, elevated   Malignant neoplasm of lung, unspecified laterality, unspecified part of lung       ED Disposition  ED Disposition       ED Disposition   Decision to Admit    Condition   --    Comment   Level of Care: Telemetry [5]   Diagnosis: Acute-on-chronic kidney injury [166818]   Admitting Physician: SUKHI SIMON [[id number]]   Attending Physician: SUKHI SIMON [[id number]]   Certification: I Certify That Inpatient Hospital  Services Are Medically Necessary For Greater Than 2 Midnights                 No follow-up provider specified.       Medication List      No changes were made to your prescriptions during this visit.            Lee Riggins MD  12/10/24 [id number]       Lee Riggins MD  12/10/24 [id number]

## 2024-12-10 NOTE — H&P
[facility] Medicine Services  HISTORY AND PHYSICAL    Date of Admission: 12/10/2024  Primary Care Physician: Roe Gardner MD    Subjective   Primary Historian: Patient    Chief Complaint: Sent over from [facility] with complaints of shortness of breath, weakness and abnormal laboratory data.    History of Present Illness  Alka Nicolas is an 89-year-old female with a past medical history of abdominal aortic aneurysm, chest pain, CAD, esophageal reflux, hyperlipidemia, hypertension, tobacco abuse, macular degeneration, thyroid disease and lung cancer.Small cell carcinoma: stage: IV (cT3, cN2, M1), extensive stage. She has opted to receive palliative systemic therapy: Carboplatin+etoposide (VP16)+atezolizumab, C1-11/5/24-11/7/24.  Patient was asked to present to the emergency department post doctor's office appointment today with abnormal laboratory data.  Patient presented today for her scheduled chemo, upon review of abnormal labs patient additionally complained of significant shortness of breath, weakness, dizziness and hypotension.  ED workup revealed an NA of 122, K of 3.3, CL 97, bicarb 12.0, BUN 59, CR 3.00, GFR 14, Ca 7.3, alkaline phosphatase 428, albumin 3.3, , , total bili 2.2 bili direct 1.6, Hb 8.5, HCT 24.7, .  Per ED patient was given a 1 L saline bolus, normal saline at 125 initiated as well as sodium bicarb 8.4 with 150 mEq in sterile water, 2 mg of morphine and 4 mg of Zofran.    Upon assessment patient appears to be in significant respiratory distress with her daughters Hui and Yaquelin at bedside.  Patient is very much alert and oriented and able to participate in assessment and history.  I proceeded to have a very lengthy discussion with family regarding laboratory data, prognosis, and patient's wishes.  Patient let me know that she initially wanted to proceed with hospice and then decided to try the palliative chemo however  it appears that she is aware that it is making things worse and she would like to stop all treatment.  Discussed the options of DNR/DNI with aggressive treatment versus comfort care.  Patient would like to proceed with comfort care measures this was agreed upon by herself, her daughter Hui and her other daughter Yaquelin.  They were made aware of the next proceeding steps to include discontinuing all aggressive treatment and medications.  Patient will be given 60 mg of IV Lasix after Sellers catheter in place to assist with fluid overload, morphine and Ativan will be given sublingual as patient would like to proceed home with hospice as soon as possible.  We will evaluate closely for tolerance and IV will be initiated if it is not sufficient.  All patient and family's questions were answered to the best my ability and they are in agreement with comfort measures and admission for hospice set up and plan of care for discharge home.    Review of Systems   Constitutional:  Positive for activity change, appetite change and diaphoresis. Negative for fever.   HENT:  Positive for congestion and rhinorrhea.    Respiratory:  Positive for cough, shortness of breath and wheezing.    Cardiovascular:  Positive for palpitations and leg swelling.   Gastrointestinal:  Positive for abdominal distention and nausea. Negative for abdominal pain, diarrhea and vomiting.   Genitourinary:  Positive for difficulty urinating.   Neurological:  Positive for tremors, weakness and headaches.   Psychiatric/Behavioral:  Negative for confusion. The patient is nervous/anxious.       Otherwise complete ROS reviewed and negative except as mentioned in the HPI.    Past Medical History:   Past Medical History:   Diagnosis Date    Abdominal aortic aneurysm     Angina pectoris     Atherosclerosis of native coronary artery of native heart without angina pectoris     CAD (coronary artery disease)     Esophageal reflux     GERD (gastroesophageal reflux disease)      History of PTCA     Hyperlipidemia     Hypertension     Hypertension, essential, benign     Lung cancer     Macular degeneration disease     Thyroid disease     Tobacco use disorder      Past Surgical History:  Past Surgical History:   Procedure Laterality Date    APPENDECTOMY      BRONCHOSCOPY N/A 9/30/2024    Procedure: BRONCHOSCOPY WITH ENDOBRONCHIAL ULTRASOUND;  Surgeon: Lucas Johnson MD;  Location:  [facility] OR;  Service: Pulmonary;  Laterality: N/A;  preop; lung nodule   postop lung nodules   PCP Roe Gardner    BRONCHOSCOPY WITH ION ROBOTIC ASSIST N/A 9/30/2024    Procedure: BRONCHOSCOPY WITH ION ROBOT and BRONCHOSCOPY WITH ENDOBRONCHIAL ULTRASOUND;  Surgeon: Lucas Johnson MD;  Location:  [facility] OR;  Service: Robotics - Pulmonary;  Laterality: N/A;  preop; lung nodules  postop lung nodules   PCP Roe Gardner    CARDIAC CATHETERIZATION      CARDIAC CATHETERIZATION Left 10/21/2020    Procedure: Cardiac Catheterization/Vascular Study;  Surgeon: Marcos Ratliff MD;  Location:  [facility] CATH INVASIVE LOCATION;  Service: Cardiology;  Laterality: Left;    CHOLECYSTECTOMY      HYSTERECTOMY      total    VENOUS ACCESS DEVICE (PORT) INSERTION N/A 10/29/2024    Procedure: Single Lumen Port-a-cath insertion with flouroscopy;  Surgeon: Sean Ibrahim MD;  Location:  [facility] OR;  Service: General;  Laterality: N/A;     Social History:  reports that she quit smoking about 4 years ago. Her smoking use included cigarettes. She has never used smokeless tobacco. She reports that she does not drink alcohol and does not use drugs.    Family History: family history includes Diabetes in her mother; Heart disease in her father; Hyperlipidemia in her father; Hypertension in her father; Stroke in her father.       Allergies:  No Known Allergies    Medications:  Prior to Admission medications    Medication Sig Start Date End Date Taking? Authorizing Provider   albuterol sulfate  (90 Base) MCG/ACT inhaler  Inhale 2 puffs 4 (Four) Times a Day. 11/3/20   Alexis Rubio MD   amLODIPine (NORVASC) 5 MG tablet TAKE ONE TABLET BY MOUH DAILY 4/3/23   Marcos Ratliff MD   amoxicillin (AMOXIL) 250 MG capsule Take 1 capsule by mouth Daily.  Patient not taking: Reported on 12/10/2024    Lynnette Valdes MD   apixaban (ELIQUIS) 2.5 MG tablet tablet Take 1 tablet by mouth Every 12 (Twelve) Hours. Indications: Atrial Fibrillation 10/24/21   Gustavo Miller DO   atorvastatin (LIPITOR) 40 MG tablet TAKE ONE TABLET BY MOUTH EVERY NIGHT. 5/2/23   Marcos Ratliff MD   carvedilol (COREG) 12.5 MG tablet Take 1 tablet by mouth 2 (Two) Times a Day. 4/29/24   Marty Peterson APRN   cloNIDine (CATAPRES) 0.1 MG tablet Take 1 tablet by mouth Daily As Needed. For blood pressure > 180/90    Lynnette Valdes MD   HYDROcodone-acetaminophen (NORCO) 5-325 MG per tablet Take 1 tablet by mouth Every 6 (Six) Hours As Needed for Moderate Pain. 10/14/24   Vance Davis MD   Hydrocortisone, Perianal, (ANUSOL-HC) 2.5 % rectal cream Insert  into the rectum As Needed.    Lynnette Valdes MD   levothyroxine (SYNTHROID, LEVOTHROID) 100 MCG tablet Take 1 tablet by mouth Daily. 3/28/22   Helen Severino APRN   lidocaine-prilocaine (EMLA) 2.5-2.5 % cream 30 minutes prior to access apply generous amount of Emla Cream to port site and cover with clear plastic wrap until ready for use 11/4/24   Vance Davis MD   Multiple Vitamins-Minerals (PRESERVISION AREDS 2+MULTI VIT PO) Take 1 capsule by mouth Daily.    Lynnette Valdes MD   ondansetron (ZOFRAN) 8 MG tablet Take 1 tablet by mouth Every 8 (Eight) Hours As Needed for Nausea or Vomiting. 10/14/24   Vance Davis MD   potassium chloride (K-DUR,KLOR-CON) 10 MEQ CR tablet Take 1 tablet by mouth Daily. 9/21/23   Provider, Historical, MD   prochlorperazine (COMPAZINE) 10 MG tablet Take 1 tablet by mouth Every 6 (Six) Hours As Needed for Nausea or Vomiting.  "11/5/24   Vance Davis MD     I have utilized all available immediate resources to obtain, update, or review the patient's current medications (including all prescriptions, over-the-counter products, herbals, cannabis/cannabidiol products, and vitamin/mineral/dietary (nutritional) supplements).    Objective     Vital Signs: BP 98/50 (BP Location: Left arm, Patient Position: Sitting)   Pulse 62   Temp 97.1 °F (36.2 °C) (Oral)   Resp 18   Ht 149.9 cm (59\")   Wt 59.5 kg (131 lb 2.8 oz)   LMP  (LMP Unknown)   SpO2 99%   BMI 26.49 kg/m²   Physical Exam  Vitals and nursing note reviewed.   Constitutional:       General: She is in acute distress.      Appearance: She is well-developed. She is ill-appearing and toxic-appearing.      Comments: 2 L per nasal cannula   HENT:      Head: Normocephalic and atraumatic.      Right Ear: Tympanic membrane normal.      Left Ear: Tympanic membrane normal.      Nose: Congestion and rhinorrhea present.      Mouth/Throat:      Mouth: Mucous membranes are moist.      Pharynx: Oropharynx is clear.   Eyes:      Pupils: Pupils are equal, round, and reactive to light.   Cardiovascular:      Rate and Rhythm: Regular rhythm. Tachycardia present.      Pulses: Normal pulses.      Heart sounds: Normal heart sounds.   Pulmonary:      Effort: Tachypnea, accessory muscle usage and respiratory distress present.      Breath sounds: Examination of the right-upper field reveals wheezing. Examination of the left-upper field reveals wheezing. Examination of the right-middle field reveals rales. Examination of the left-middle field reveals rhonchi and rales. Examination of the right-lower field reveals rales. Examination of the left-lower field reveals rhonchi and rales. Wheezing, rhonchi and rales present.   Musculoskeletal:      Cervical back: Normal range of motion. No rigidity or tenderness.      Right lower leg: Edema present.      Left lower leg: Edema present.   Skin:     General: Skin " is warm.      Capillary Refill: Capillary refill takes 2 to 3 seconds. Capillary refill takes less than 2 seconds.      Coloration: Skin is pale.   Neurological:      General: No focal deficit present.      Mental Status: She is oriented to person, place, and time.   Psychiatric:         Attention and Perception: Attention normal.         Mood and Affect: Mood is anxious.         Speech: Speech normal.         Behavior: Behavior is cooperative.         Cognition and Memory: Cognition and memory normal.          Results Reviewed:  Lab Results (last 24 hours)       Procedure Component Value Units Date/Time    Bilirubin, Total & Direct [[id number]]  (Abnormal) Collected: 12/10/24 1030    Specimen: Blood Updated: 12/10/24 1113     Total Bilirubin 2.1 mg/dL      Bilirubin, Direct 1.6 mg/dL      Bilirubin, Indirect 0.5 mg/dL           Imaging Results (Last 24 Hours)       Procedure Component Value Units Date/Time    CT Abdomen Pelvis Without Contrast [[id number]] Collected: 12/10/24 1050     Updated: 12/10/24 1100    Narrative:      EXAM: CT ABDOMEN PELVIS WO CONTRAST-      DATE: 12/10/2024 9:35 AM     HISTORY: Worsening renal function with elevated T. bili history of small  cell lung carcinoma possible metastasis       COMPARISON: None available.     DOSE LENGTH PRODUCT: 203.11 mGy.cm Automatic exposure control was  utilized to make radiation dose as low as reasonably achievable.     TECHNIQUE: Noncontrast axial images of the abdomen and pelvis obtained  with multiplanar reformats.     FINDINGS:  VISUALIZED CHEST: No pericardial or left pleural effusion is seen. There  is a small right pleural effusion with associated atelectasis.     LIVER/BILE DUCTS: The unenhanced liver is unremarkable. Patient is  status post cholecystectomy.     KIDNEYS/URETERS: There are multiple right renal cysts. There is no  hydronephrosis. Probable hyperdense cyst at the lower pole of the left  kidney is also noted.     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 Right  adrenal nodule measures 1.6 cm and was hypermetabolic on the PET from  10/28/2024.      SPLEEN: Unremarkable.     PANCREAS: Unenhanced pancreas is unremarkable.     MESENTERY: No mesenteric or retroperitoneal lymphadenopathy is seen.     VASCULATURE: There is calcified atherosclerosis of the abdominal aorta  which is torturous.     GI TRACT: There is a small hiatal hernia. There is no bowel obstruction.  There is moderate stool in the colon.     PELVIS: Small collection of gas is noted anteriorly in the urinary  bladder may be infectious. Patient is status post hysterectomy. There is  diverticulosis of the sigmoid colon and free fluid is noted in the  pelvis.     SOFT TISSUES: Normal appearance of the overlying abdominal  and pelvic  soft tissues.      BONES: No acute or aggressive bony lesion.           Impression:      1. Small right pleural effusion with associated atelectasis is new  compared to the prior PET from 10/28/2024.  2. No definite hepatic lesion is seen on the noncontrast enhanced  images. The patient is status post cholecystectomy.  3. Left adrenal milo lipoma and stable right adrenal nodule which was  hypermetabolic on the prior PET.  4. Moderate stool in the colon and diverticulosis of the sigmoid colon.  5. Collection of air in the urinary bladder may be infectious in  etiology.  6. Small free fluid in the pelvis of unclear etiology.        This report was signed and finalized on 12/10/2024 10:57 AM by Marcos Umanzor.                 Assessment / Plan   Assessment:   Active Hospital Problems    Diagnosis     **Small cell carcinoma of overlapping sites of lung     Hyponatremia     Hyperbilirubinemia     Hypocalcemia     Transaminitis     Anemia of chronic disease     Paroxysmal atrial fibrillation     Moderate malnutrition     Acute renal failure superimposed on stage 4 chronic kidney disease        Treatment Plan  The patient will be admitted to   [facility]'s service [facility] at [facility].    1.  Initiate comfort care measures, home hospice consultation initiated.    2.  Morphine 2 mg IV or 10 mg sublingual for mild pain, morphine 4 mg IV or 10 mg SL for moderate pain, morphine 6 mg IV or 20 mg SL for severe pain every hour.  Requested nursing staff give the patient sublingual first as patient would like to discharge home with hospice is soon as possible and would like to see if this would be tolerated initially before administering IV.  X line    3.  Initiated Sellers catheter for urinary retention and comfort.  Lasix 60 mg IV x 1 given post.  20 mg of IV Lasix every 2 hours for continued respiratory distress as needed.  Sign    4.  Scopolamine patch initiated and atropine as needed for secretions.    5.  Albuterol nebulizers as needed every 6 hours for any significant wheezing or shortness of breath.    6.  MOST form to be filled out as soon as possible.      Medical Decision Making  Small cell carcinoma of overlapping sites of the lung, chronic, high complexity, worsening  Hyponatremia, acute, high complexity, unchanged  Hyperbilirubinemia, acute, high complexity, unchanged  Hypocalcemia, acute on chronic, high complexity, worsening  Transaminitis, acute on chronic, high complexity, worsening  Acute renal failure superimposed on stage IV CKD, acute, high complexity, worsening  Number and Complexity of problems: 9  Differential Diagnosis: None    Conditions and Status        Condition is worsening.     MDM Data  External documents reviewed: Review of previous hospitalizations, and multiple oncology treatments and office notes.  Cardiac tracing (EKG, telemetry) interpretation: None  Radiology interpretation: 12/10/2024 CT abdomen pelvis per radiology reviewed  Labs reviewed: 12/10/2024 CBC, CMP, magnesium, total bilirubin reviewed  Any tests that were considered but not ordered: None     Decision rules/scores evaluated (example [id number], Wells,  etc): None     Discussed with:Dr. Sullivan, patient, her daughters Lyle     Care Planning  Shared decision making: Dr. Sullivan, patient, her daughters Hui and Yaquelin  Code status and discussions: Initially DNR/DNI transition to comfort measures    Disposition  Social Determinants of Health that impact treatment or disposition: Initiated case management consultation for home hospice  Estimated length of stay is-2 days.     I confirmed that the patient's advanced care plan is present, code status is documented, and a surrogate decision maker is listed in the patient's medical record.     The patient's surrogate decision makers are her daughters Allison Ames, Yaquelin and son John.     The patient was seen and examined by me on 12/10/2024 at 1141.    Electronically signed by CHRISTIANO Muñoz, 12/10/24, 12:54 CST.

## 2024-12-10 NOTE — PLAN OF CARE
Goal Outcome Evaluation:      Patient resting at this time. Family at bedside with patient. Respiration uneven and labored. Iv site patent and intact. Call light and water within reach of patient. Bed lock and low.       Problem: Adult Inpatient Plan of Care  Goal: Plan of Care Review  Outcome: Progressing  Goal: Patient-Specific Goal (Individualized)  Outcome: Progressing  Goal: Absence of Hospital-Acquired Illness or Injury  Outcome: Progressing  Goal: Optimal Comfort and Wellbeing  Outcome: Progressing  Goal: Readiness for Transition of Care  Outcome: Progressing  Intervention: Mutually Develop Transition Plan  Recent Flowsheet Documentation  Taken 12/10/2024 1506 by Malina Oden, RN  Transportation Anticipated: family or friend will provide  Patient/Family Anticipated Services at Transition: home health care  Patient/Family Anticipates Transition to: home with help/services  Taken 12/10/2024 1502 by Malina Oden, RN  Equipment Currently Used at Home: none     Problem: Skin Injury Risk Increased  Goal: Skin Health and Integrity  Outcome: Progressing

## 2024-12-10 NOTE — ED NOTES
Nursing report ED to floor  Alka Nicolas  89 y.o.  female    HPI:   Chief Complaint   Patient presents with    Abnormal Lab       Admitting doctor:   Shankar Sullivan MD    Consulting provider(s):  Consults       No orders found from 11/11/2024 to 12/11/2024.             Admitting diagnosis:   The primary encounter diagnosis was Acute kidney injury superimposed on chronic kidney disease. Diagnoses of Hyponatremia, Hypokalemia, Transaminitis, Total bilirubin, elevated, and Malignant neoplasm of lung, unspecified laterality, unspecified part of lung were also pertinent to this visit.    Code status:   Current Code Status       Date Active Code Status Order ID Comments User Context       12/10/2024 1220 No CPR (Do Not Attempt to Resuscitate) [id number]  , [facility]        Question Answer    Code Status (Patient has no pulse and is not breathing) No CPR (Do Not Attempt to Resuscitate)    Medical Interventions (Patient has pulse or is breathing) Comfort Measures    Level Of Support Discussed With Patient     Next of Kin (If No Surrogate)                    Allergies:   Patient has no known allergies.    Intake and Output    Intake/Output Summary (Last 24 hours) at 12/10/2024 1446  Last data filed at 12/10/2024 1233  Gross per 24 hour   Intake 1279.17 ml   Output --   Net 1279.17 ml       Weight:       12/10/24  0950   Weight: 59.5 kg (131 lb 2.8 oz)       Most recent vitals:   Vitals:    12/10/24 1345 12/10/24 1401 12/10/24 1416 12/10/24 1439   BP:  109/58 (!) 83/43 (!) 72/38   BP Location:       Patient Position:       Pulse: 66 65 57 53   Resp:       Temp:       TempSrc:       SpO2: 100% 100% 98% 97%   Weight:       Height:         Oxygen Therapy: .    Active LDAs/IV Access:   Lines, Drains & Airways       Active LDAs       Name Placement date Placement time Site Days    Peripheral IV 12/10/24 1000 Distal;Left;Posterior Forearm 12/10/24  1000  Forearm  less than 1    Peripheral IV 12/10/24 1125  Posterior;Right Hand 12/10/24  1125  Hand  less than 1    Urethral Catheter Silicone 16 Fr. 12/10/24  1352  -- less than 1    Single Lumen Implantable Port 10/29/24 Left Subclavian 10/29/24  1344  Subclavian  42                    Labs (abnormal labs have a star):   Labs Reviewed   BILIRUBIN, TOTAL AND DIRECT - Abnormal; Notable for the following components:       Result Value    Total Bilirubin 2.1 (*)     Bilirubin, Direct 1.6 (*)     All other components within normal limits       Meds given in ED:   Medications   prochlorperazine (COMPAZINE) tablet 10 mg (has no administration in time range)   Polyvinyl Alcohol-Povidone PF (ARTIFICIAL TEARS) 1.4-0.6 % ophthalmic solution 1 drop (has no administration in time range)   ondansetron ODT (ZOFRAN-ODT) disintegrating tablet 4 mg (has no administration in time range)     Or   ondansetron (ZOFRAN) injection 4 mg (has no administration in time range)   scopolamine patch 1 mg/72 hr (has no administration in time range)   atropine 1 % ophthalmic solution 2 drop (has no administration in time range)   LORazepam (ATIVAN) tablet 0.5 mg (has no administration in time range)     Or   LORazepam (ATIVAN) injection 0.5 mg (has no administration in time range)     Or   LORazepam (ATIVAN) injection 0.5 mg (has no administration in time range)     Or   LORazepam (ATIVAN) injection 0.5 mg (has no administration in time range)     Or   LORazepam (ATIVAN) 2 MG/ML concentrated solution 0.5 mg (has no administration in time range)     Or   LORazepam (ATIVAN) 2 MG/ML concentrated solution 0.5 mg (has no administration in time range)   LORazepam (ATIVAN) tablet 1 mg ( Oral Not Given:  See Alt 12/10/24 1400)     Or   LORazepam (ATIVAN) injection 1 mg (1 mg Intravenous Given 12/10/24 1400)     Or   LORazepam (ATIVAN) injection 1 mg ( Intramuscular Not Given:  See Alt 12/10/24 1400)     Or   LORazepam (ATIVAN) injection 1 mg ( Subcutaneous Not Given:  See Alt 12/10/24 1400)     Or   LORazepam  (ATIVAN) 2 MG/ML concentrated solution 1 mg ( Oral Not Given:  See Alt 12/10/24 1400)     Or   LORazepam (ATIVAN) 2 MG/ML concentrated solution 1 mg ( Sublingual Not Given:  See Alt 12/10/24 1400)   LORazepam (ATIVAN) tablet 2 mg (has no administration in time range)     Or   LORazepam (ATIVAN) injection 2 mg (has no administration in time range)     Or   LORazepam (ATIVAN) injection 2 mg (has no administration in time range)     Or   LORazepam (ATIVAN) injection 2 mg (has no administration in time range)     Or   LORazepam (ATIVAN) 2 MG/ML concentrated solution 2 mg (has no administration in time range)     Or   LORazepam (ATIVAN) 2 MG/ML concentrated solution 2 mg (has no administration in time range)   diphenoxylate-atropine (LOMOTIL) 2.5-0.025 MG per tablet 1 tablet (has no administration in time range)   furosemide (LASIX) injection 20 mg (has no administration in time range)     Or   furosemide (LASIX) injection 20 mg (has no administration in time range)   morphine injection 4 mg (4 mg Intravenous Given 12/10/24 1400)     Or   morphine concentrated solution 10 mg ( Sublingual Not Given:  See Alt 12/10/24 1400)   Morphine sulfate (PF) injection 2 mg (has no administration in time range)     Or   morphine concentrated solution 10 mg (has no administration in time range)   morphine injection 6 mg (has no administration in time range)     Or   morphine concentrated solution 20 mg (has no administration in time range)   albuterol (PROVENTIL) nebulizer solution 0.083% 2.5 mg/3mL (has no administration in time range)   sodium chloride 0.9 % bolus 1,000 mL (0 mL Intravenous Stopped 12/10/24 1233)   Morphine sulfate (PF) injection 2 mg (2 mg Intravenous Given 12/10/24 1132)   ondansetron (ZOFRAN) injection 4 mg (4 mg Intravenous Given 12/10/24 1132)   furosemide (LASIX) injection 60 mg (60 mg Intravenous Given 12/10/24 1345)           NIH Stroke Scale:       Isolation/Infection(s):  No active isolations   No active  infections     COVID Testing  Collected .  Resulted .    Nursing report ED to floor:  Mental status: AXO x3.  Ambulatory status: bedrest.  Precautions: NONE.    ED nurse phone [phone] ..

## 2024-12-10 NOTE — TELEPHONE ENCOUNTER
----- Message from Vance Davis sent at 12/10/2024  8:56 AM CST -----  Needs to go to ER.  Multiple electrolyte abnormalities including acute kidney failure  ----- Message -----  From: Lab, Background User  Sent: 12/10/2024   8:00 AM CST  To: Vance Davis MD

## 2024-12-10 NOTE — Clinical Note
Level of Care: Telemetry [5]   Diagnosis: Acute-on-chronic kidney injury [371795]   Admitting Physician: SUKHI SIMON [[id number]]   Attending Physician: SUKHI SIMON [[id number]]   Certification: I Certify That Inpatient Hospital Services Are Medically Necessary For Greater Than 2 Midnights   Suturegard Retention Suture: 2-0 Nylon

## 2024-12-11 NOTE — PROGRESS NOTES
Nutrition Services    Patient Name:  Alka Nicolas  YOB: 1935  MRN: [id number]  Admit Date:  12/10/2024    Inpatient nutrition services reviewed POC. Interventions align with the goal(s) to maintain comfort at this time. Inpatient nutrition services/RD available upon request.     Electronically signed by:  Veronica Chino RDN, POLLY  12/11/24 08:42 CST

## 2024-12-11 NOTE — PROGRESS NOTES
Patient Name: Alka Nicolas  Date of Admission: 12/10/2024  Today's Date: 12/11/24  Length of Stay: 1  Primary Care Physician: Roe Gardner MD    Subjective   Chief Complaint:    Abnormal Lab  Symptoms include congestion.       Alka Nicolas is an 89-year-old female with a past medical history of abdominal aortic aneurysm, chest pain, CAD, esophageal reflux, hyperlipidemia, hypertension, tobacco abuse, macular degeneration, thyroid disease and lung cancer.Small cell carcinoma: stage: IV (cT3, cN2, M1), extensive stage. She has opted to receive palliative systemic therapy: Carboplatin+etoposide (VP16)+atezolizumab, C1-11/5/24-11/7/24.  Patient was asked to present to the emergency department post doctor's office appointment today with abnormal laboratory data.  Patient presented today for her scheduled chemo, upon review of abnormal labs patient additionally complained of significant shortness of breath, weakness, dizziness and hypotension.  ED workup revealed an NA of 122, K of 3.3, CL 97, bicarb 12.0, BUN 59, CR 3.00, GFR 14, Ca 7.3, alkaline phosphatase 428, albumin 3.3, , , total bili 2.2 bili direct 1.6, Hb 8.5, HCT 24.7, .  Per ED patient was given a 1 L saline bolus, normal saline at 125 initiated as well as sodium bicarb 8.4 with 150 mEq in sterile water, 2 mg of morphine and 4 mg of Zofran.     Upon assessment patient appears to be in significant respiratory distress with her daughters Hui and Yaquelin at bedside.  Patient is very much alert and oriented and able to participate in assessment and history.  I proceeded to have a very lengthy discussion with family regarding laboratory data, prognosis, and patient's wishes.  Patient let me know that she initially wanted to proceed with hospice and then decided to try the palliative chemo however it appears that she is aware that it is making things worse and she would like to stop all treatment.  Discussed the options of  DNR/DNI with aggressive treatment versus comfort care.  Patient would like to proceed with comfort care measures this was agreed upon by herself, her daughter Hui and her other daughter Yaquelin.  They were made aware of the next proceeding steps to include discontinuing all aggressive treatment and medications.  Patient will be given 60 mg of IV Lasix after Sellers catheter in place to assist with fluid overload, morphine and Ativan will be given sublingual as patient would like to proceed home with hospice as soon as possible.  We will evaluate closely for tolerance and IV will be initiated if it is not sufficient.  All patient and family's questions were answered to the best my ability and they are in agreement with comfort measures and admission for hospice set up and plan of care for discharge home.    Today:   Patient is resting in bed with her 2 daughters and her  at bedside.  Patient appears mildly tachypneic with mild congestion.  Requested nurse give patient her sublingual morphine and Ativan while assessing patient.  Very extensive discussion on hospice, plan of care, and again reasoning.  They are very appreciative of our discussions and feel more comfortable with what is happening now.  I additionally had a long conversation with the patient's spouse who has dementia to get him to begin to understand what is happening to his wife.  They were made aware that hospice consult was in place, they would come today to assess their needs, and any additional DME.  If patient continues to be somnolent and comfortable she should be ready for discharge home with hospice in the AM.    Documented weights    12/10/24 0950   Weight: 59.5 kg (131 lb 2.8 oz)          Intake/Output Summary (Last 24 hours) at 12/11/2024 1102  Last data filed at 12/11/2024 0000  Gross per 24 hour   Intake 1279.17 ml   Output 450 ml   Net 829.17 ml       Results for orders placed during the hospital encounter of 10/22/21    Adult  Transthoracic Echo Complete w/ Color, Spectral and Contrast if Necessary Per Protocol    Interpretation Summary  · Left ventricular wall thickness is consistent with mild eccentric hypertrophy.  · Left ventricular ejection fraction appears to be 56 - 60%. Left ventricular systolic function is normal.  · Left ventricular diastolic function was indeterminate.  · Left atrial volume is mildly increased.  · There is mild, anterior mitral leaflet thickening present.  · Mild mitral valve stenosis is present.  · There is a circumferential pericardial effusion.       Review of Systems   HENT:  Positive for congestion.    Respiratory:  Positive for shortness of breath.       All pertinent negatives and positives are as above. All other systems have been reviewed and are negative unless otherwise stated.     Objective    Temp:  [98 °F (36.7 °C)] 98 °F (36.7 °C)  Heart Rate:  [53-66] 64  Resp:  [16-18] 16  BP: ()/(38-58) 107/42  Physical Exam  Nursing note reviewed.   Constitutional:       General: She is sleeping. She is not in acute distress.     Appearance: She is ill-appearing. She is not toxic-appearing.      Interventions: She is sedated.   HENT:      Head: Normocephalic and atraumatic.      Right Ear: Tympanic membrane normal.      Left Ear: Tympanic membrane normal.      Nose: Congestion present.   Cardiovascular:      Rate and Rhythm: Normal rate and regular rhythm.   Pulmonary:      Effort: Tachypnea present. No accessory muscle usage or respiratory distress.      Breath sounds: Rhonchi and rales present.   Abdominal:      General: There is distension.   Genitourinary:     Comments: Sellers catheter in place  Musculoskeletal:      Right lower leg: Edema present.      Left lower leg: Edema present.   Skin:     Coloration: Skin is ashen and jaundiced.   Psychiatric:         Speech: She is noncommunicative.         Behavior: Behavior is withdrawn.       Results Review:  I have reviewed the labs, radiology results,  and diagnostic studies.    Laboratory Data:   Results from last 7 days   Lab Units 12/10/24  0743   WBC 10*3/mm3 3.86   HEMOGLOBIN g/dL 8.5*   HEMATOCRIT % 24.7*   PLATELETS 10*3/mm3 103*        Results from last 7 days   Lab Units 12/10/24  1030 12/10/24  0743   SODIUM mmol/L  --  122*   POTASSIUM mmol/L  --  3.3*   CHLORIDE mmol/L  --  97*   CO2 mmol/L  --  12.0*   BUN mg/dL  --  59*   CREATININE mg/dL  --  3.00*   CALCIUM mg/dL  --  7.3*   BILIRUBIN mg/dL 2.1* 2.2*   ALK PHOS U/L  --  428*   ALT (SGPT) U/L  --  145*   AST (SGOT) U/L  --  201*   GLUCOSE mg/dL  --  117*       Culture Data:     Microbiology Results (last 10 days)       ** No results found for the last 240 hours. **             Radiology Data:   Imaging Results (Last 7 Days)       Procedure Component Value Units Date/Time    CT Abdomen Pelvis Without Contrast [[id number]] Collected: 12/10/24 1050     Updated: 12/10/24 1100    Narrative:      EXAM: CT ABDOMEN PELVIS WO CONTRAST-      DATE: 12/10/2024 9:35 AM     HISTORY: Worsening renal function with elevated T. bili history of small  cell lung carcinoma possible metastasis       COMPARISON: None available.     DOSE LENGTH PRODUCT: 203.11 mGy.cm Automatic exposure control was  utilized to make radiation dose as low as reasonably achievable.     TECHNIQUE: Noncontrast axial images of the abdomen and pelvis obtained  with multiplanar reformats.     FINDINGS:  VISUALIZED CHEST: No pericardial or left pleural effusion is seen. There  is a small right pleural effusion with associated atelectasis.     LIVER/BILE DUCTS: The unenhanced liver is unremarkable. Patient is  status post cholecystectomy.     KIDNEYS/URETERS: There are multiple right renal cysts. There is no  hydronephrosis. Probable hyperdense cyst at the lower pole of the left  kidney is also noted.     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 Right  adrenal nodule measures 1.6 cm and was hypermetabolic on the PET  from  10/28/2024.      SPLEEN: Unremarkable.     PANCREAS: Unenhanced pancreas is unremarkable.     MESENTERY: No mesenteric or retroperitoneal lymphadenopathy is seen.     VASCULATURE: There is calcified atherosclerosis of the abdominal aorta  which is torturous.     GI TRACT: There is a small hiatal hernia. There is no bowel obstruction.  There is moderate stool in the colon.     PELVIS: Small collection of gas is noted anteriorly in the urinary  bladder may be infectious. Patient is status post hysterectomy. There is  diverticulosis of the sigmoid colon and free fluid is noted in the  pelvis.     SOFT TISSUES: Normal appearance of the overlying abdominal  and pelvic  soft tissues.      BONES: No acute or aggressive bony lesion.           Impression:      1. Small right pleural effusion with associated atelectasis is new  compared to the prior PET from 10/28/2024.  2. No definite hepatic lesion is seen on the noncontrast enhanced  images. The patient is status post cholecystectomy.  3. Left adrenal milo lipoma and stable right adrenal nodule which was  hypermetabolic on the prior PET.  4. Moderate stool in the colon and diverticulosis of the sigmoid colon.  5. Collection of air in the urinary bladder may be infectious in  etiology.  6. Small free fluid in the pelvis of unclear etiology.        This report was signed and finalized on 12/10/2024 10:57 AM by Marcos Umanzor.                I have reviewed the patient's current medications.     albuterol    atropine    diphenoxylate-atropine    furosemide **OR** furosemide    LORazepam **OR** LORazepam **OR** LORazepam **OR** LORazepam **OR** LORazepam **OR** LORazepam    LORazepam **OR** LORazepam **OR** LORazepam **OR** LORazepam **OR** LORazepam **OR** LORazepam    LORazepam **OR** LORazepam **OR** LORazepam **OR** LORazepam **OR** LORazepam **OR** LORazepam    Morphine **OR** morphine    Morphine **OR** morphine    Morphine **OR** morphine    ondansetron ODT  **OR** ondansetron    Polyvinyl Alcohol-Povidone PF    prochlorperazine    Scopolamine            Assessment/Plan   Assessment  Active Hospital Problems    Diagnosis     **Small cell carcinoma of overlapping sites of lung     Hyponatremia     Hyperbilirubinemia     Hypocalcemia     Transaminitis     Anemia of chronic disease     Acute-on-chronic kidney injury     Paroxysmal atrial fibrillation     Acute renal failure superimposed on stage 4 chronic kidney disease        Treatment Plan  1.  Continue comfort care measures, home hospice consultation pending, most likely discharge home with hospice in the a.m.    2.  Continue morphine 2 mg IV or 10 mg sublingual for mild pain, morphine 4 mg IV or 10 mg SL for moderate pain, morphine 6 mg IV or 20 mg SL for severe pain every hour.  Requested nursing staff give the patient sublingual first as patient would like to discharge home with hospice is soon as possible and would like to see if this would be tolerated initially before administering IV.  X line     3.  Continue Sellesr catheter for urinary retention and comfort.  Lasix 60 mg IV x 1 given post.  20 mg of IV Lasix every 2 hours for continued respiratory distress as needed.  Sign     4.  Continue scopolamine patch initiated and atropine as needed for secretions.     5.  Continue albuterol nebulizers as needed every 6 hours for any significant wheezing or shortness of breath.     6.  MOST form to be filled out prior to discharge        Medical Decision Making  Small cell carcinoma of overlapping sites of the lung, chronic, high complexity, worsening  Hyponatremia, acute, high complexity, unchanged  Hyperbilirubinemia, acute, high complexity, unchanged  Hypocalcemia, acute on chronic, high complexity, worsening  Transaminitis, acute on chronic, high complexity, worsening  Acute renal failure superimposed on stage IV CKD, acute, high complexity, worsening  Number and Complexity of problems: 9  Differential Diagnosis:  None     Conditions and Status        Condition is unchanged     [facility] Data  External documents reviewed: Review of previous hospitalizations, and multiple oncology treatments and office notes.  Cardiac tracing (EKG, telemetry) interpretation: None  Radiology interpretation: 12/10/2024 CT abdomen pelvis per radiology reviewed  Labs reviewed: 12/10/2024 CBC, CMP, magnesium, total bilirubin reviewed  Any tests that were considered but not ordered: None     Decision rules/scores evaluated (example [id number], Wells, etc): None     Discussed with:Dr. Chao, patient, her daughters Lyle and her  John     Care Planning  Shared decision making: Dr. Chao, patient, her daughters Lyle and her  Ojhn  Code status and discussions: Initially DNR/DNI transition to comfort measures  Surrogate Decision Maker her daughters Lyle    Disposition  Social Determinants of Health that impact treatment or disposition: Home with hospice  I expect the patient to be discharged to home with hospice in 1 day.     Electronically signed by JOSE Muñoz, 12/11/24, 11:02 CST.

## 2024-12-11 NOTE — PLAN OF CARE
Problem: Adult Inpatient Plan of Care  Goal: Plan of Care Review  Outcome: Progressing  Flowsheets (Taken 12/11/2024 0437)  Progress: no change  Outcome Evaluation: Comfort measures. Patient slept all shift. Arouses slightly with touch. Sellers catheter in place. Family at bedside. Safety maintained. Will continue to monitor.  Plan of Care Reviewed With:   child   spouse  Goal: Patient-Specific Goal (Individualized)  Outcome: Progressing  Goal: Absence of Hospital-Acquired Illness or Injury  Outcome: Progressing  Intervention: Identify and Manage Fall Risk  Recent Flowsheet Documentation  Taken 12/11/2024 0400 by Delia Cole RN  Safety Promotion/Fall Prevention: safety round/check completed  Taken 12/11/2024 0200 by Delia Cole RN  Safety Promotion/Fall Prevention: safety round/check completed  Taken 12/11/2024 0000 by Delia Cole RN  Safety Promotion/Fall Prevention: safety round/check completed  Taken 12/10/2024 2200 by Delia Cole RN  Safety Promotion/Fall Prevention: safety round/check completed  Taken 12/10/2024 2100 by Delia Cole RN  Safety Promotion/Fall Prevention: safety round/check completed  Taken 12/10/2024 1930 by Delia Cole RN  Safety Promotion/Fall Prevention: safety round/check completed  Intervention: Prevent Skin Injury  Recent Flowsheet Documentation  Taken 12/10/2024 1930 by Delia Cole RN  Body Position:   turned   right  Skin Protection:   incontinence pads utilized   protective footwear used   transparent dressing maintained  Intervention: Prevent Infection  Recent Flowsheet Documentation  Taken 12/10/2024 1930 by Delia Cole RN  Infection Prevention:   rest/sleep promoted   single patient room provided  Goal: Optimal Comfort and Wellbeing  Outcome: Progressing  Intervention: Provide Person-Centered Care  Recent Flowsheet Documentation  Taken 12/10/2024 1930 by Delia Cole RN  Trust Relationship/Rapport:   care explained   questions answered   questions  encouraged  Goal: Readiness for Transition of Care  Outcome: Progressing     Problem: Skin Injury Risk Increased  Goal: Skin Health and Integrity  Outcome: Progressing  Intervention: Optimize Skin Protection  Recent Flowsheet Documentation  Taken 12/10/2024 1930 by Delia Cole RN  Activity Management: unable to complete activity (see comments)  Pressure Reduction Techniques: weight shift assistance provided  Head of Bed (HOB) Positioning:   HOB elevated   HOB at 30 degrees  Pressure Reduction Devices: pressure-redistributing mattress utilized  Skin Protection:   incontinence pads utilized   protective footwear used   transparent dressing maintained   Goal Outcome Evaluation:  Plan of Care Reviewed With: child, spouse        Progress: no change  Outcome Evaluation: Comfort measures. Patient slept all shift. Arouses slightly with touch. Sellers catheter in place. Family at bedside. Safety maintained. Will continue to monitor.

## 2024-12-11 NOTE — PLAN OF CARE
Goal Outcome Evaluation:     Patient resting at this time. Family at bedside with patient. Respiration uneven and labored. Iv site patent and intact. Call light and water within reach of patient. Bed lock and low.       Problem: Adult Inpatient Plan of Care  Goal: Plan of Care Review  Outcome: Progressing  Goal: Patient-Specific Goal (Individualized)  Outcome: Progressing  Goal: Absence of Hospital-Acquired Illness or Injury  Outcome: Progressing  Intervention: Identify and Manage Fall Risk  Recent Flowsheet Documentation  Taken 12/11/2024 0743 by Malina Oden RN  Safety Promotion/Fall Prevention: safety round/check completed  Intervention: Prevent Skin Injury  Recent Flowsheet Documentation  Taken 12/11/2024 0743 by Malina Oden, RN  Body Position:   supine   heels elevated   position maintained  Goal: Optimal Comfort and Wellbeing  Outcome: Progressing  Intervention: Provide Person-Centered Care  Recent Flowsheet Documentation  Taken 12/11/2024 0743 by Malina Oden, RN  Trust Relationship/Rapport: care explained  Goal: Readiness for Transition of Care  Outcome: Progressing     Problem: Skin Injury Risk Increased  Goal: Skin Health and Integrity  Outcome: Progressing  Intervention: Optimize Skin Protection  Recent Flowsheet Documentation  Taken 12/11/2024 0743 by Malina Oden, RN  Head of Bed (HOB) Positioning: HOB elevated

## 2024-12-11 NOTE — CASE MANAGEMENT/SOCIAL WORK
Continued Stay Note   [place]     Patient Name: Alka Nicolas  MRN: [id number]  Today's Date: 12/11/2024    Admit Date: 12/10/2024    Plan: Home with hospice   Discharge Plan       Row Name 12/11/24 0922       Plan    Plan Home with hospice    Plan Comments Hospice consult provided to Rosa Mae with [facility].                   Discharge Codes    No documentation.                       SONU Rosenbaum

## 2024-12-11 NOTE — CASE MANAGEMENT/SOCIAL WORK
Discharge Planning Assessment   [place]     Patient Name: Alka Nicolas  MRN: [id number]  Today's Date: 12/11/2024    Admit Date: 12/10/2024    Plan: Home with hospice   Discharge Needs Assessment       Row Name 12/11/24 1103       Living Environment    People in Home spouse    Current Living Arrangements home    Potentially Unsafe Housing Conditions none    In the past 12 months has the electric, gas, oil, or water company threatened to shut off services in your home? No    Primary Care Provided by self    Provides Primary Care For no one    Family Caregiver if Needed child(yisel), adult    Family Caregiver Names Cleopatra Bridges    Quality of Family Relationships helpful;involved;supportive    Able to Return to Prior Arrangements yes       Resource/Environmental Concerns    Resource/Environmental Concerns none    Transportation Concerns none       Transportation Needs    In the past 12 months, has lack of transportation kept you from medical appointments or from getting medications? no    In the past 12 months, has lack of transportation kept you from meetings, work, or from getting things needed for daily living? No       Food Insecurity    Within the past 12 months, you worried that your food would run out before you got the money to buy more. Never true    Within the past 12 months, the food you bought just didn't last and you didn't have money to get more. Never true       Transition Planning    Patient/Family Anticipates Transition to home with help/services    Patient/Family Anticipated Services at Transition home health care    Transportation Anticipated family or friend will provide       Discharge Needs Assessment    Readmission Within the Last 30 Days no previous admission in last 30 days    Equipment Currently Used at Home none    Concerns to be Addressed no discharge needs identified    Do you want help finding or keeping work or a job? I do not need or want help    Do you want help with school or  training? For example, starting or completing job training or getting a high school diploma, GED or equivalent No    Anticipated Changes Related to Illness none    Equipment Needed After Discharge none    Provided Post Acute Provider List? N/A    Provided Post Acute Provider Quality & Resource List? N/A    Discharge Coordination/Progress Lives at home with her . Daughter provides transportation. Has PCP and Rx coverage. No D/C needs identified at this time.                   Discharge Plan       Row Name 12/11/24 0922       Plan    Plan Home with hospice    Plan Comments Hospice consult provided to Rosa Mae with [facility].                  Continued Care and Services - Admitted Since 12/10/2024       Community Resources       Service Provider Request Status Services Address Phone Fax Patient Preferred    [facility] - HOS[facility], [facility] Pending - No Request Sent -- [address] [place] [address] [address] [facility] [address], [facility] [id number] [phone] --                     Demographic Summary    No documentation.                  Functional Status    No documentation.                  Psychosocial    No documentation.                  Abuse/Neglect    No documentation.                  Legal    No documentation.                  Substance Abuse    No documentation.                  Patient Forms    No documentation.                     Marlo Palmer RN

## 2024-12-12 PROBLEM — Z51.5 END OF LIFE CARE: Status: ACTIVE | Noted: 2024-01-01

## 2024-12-12 NOTE — PLAN OF CARE
Problem: Adult Inpatient Plan of Care  Goal: Plan of Care Review  Outcome: Progressing  Flowsheets (Taken 12/12/2024 0341)  Progress: no change  Outcome Evaluation: Comfort measures. Patient slept all shift. Arouses slightly with touch. Sellers catheter in place. Family at bedside. Patient to go home with hospice. Safety maintained. Will continue to monitor.  Plan of Care Reviewed With: family  Goal: Patient-Specific Goal (Individualized)  Outcome: Progressing  Goal: Absence of Hospital-Acquired Illness or Injury  Outcome: Progressing  Intervention: Identify and Manage Fall Risk  Recent Flowsheet Documentation  Taken 12/12/2024 0000 by Delia Cole RN  Safety Promotion/Fall Prevention: safety round/check completed  Taken 12/11/2024 2200 by Delia Cole RN  Safety Promotion/Fall Prevention: safety round/check completed  Taken 12/11/2024 2100 by Delia Cole RN  Safety Promotion/Fall Prevention: safety round/check completed  Taken 12/11/2024 2000 by Delia Cole RN  Safety Promotion/Fall Prevention: safety round/check completed  Taken 12/11/2024 1930 by Delia Cole RN  Safety Promotion/Fall Prevention: safety round/check completed  Intervention: Prevent Skin Injury  Recent Flowsheet Documentation  Taken 12/11/2024 1930 by Delia Cole RN  Body Position:   supine   heels elevated   patient/family refused  Skin Protection:   incontinence pads utilized   protective footwear used   transparent dressing maintained  Intervention: Prevent Infection  Recent Flowsheet Documentation  Taken 12/11/2024 1930 by Delia Cole RN  Infection Prevention:   single patient room provided   rest/sleep promoted  Goal: Optimal Comfort and Wellbeing  Outcome: Progressing  Intervention: Provide Person-Centered Care  Recent Flowsheet Documentation  Taken 12/11/2024 1930 by Delia Cole RN  Trust Relationship/Rapport:   care explained   questions answered   questions encouraged  Goal: Readiness for Transition of Care  Outcome:  Progressing     Problem: Skin Injury Risk Increased  Goal: Skin Health and Integrity  Outcome: Progressing  Intervention: Optimize Skin Protection  Recent Flowsheet Documentation  Taken 12/11/2024 1930 by Delia Cole RN  Activity Management: unable to complete activity (see comments)  Pressure Reduction Techniques: weight shift assistance provided  Head of Bed (HOB) Positioning:   HOB elevated   HOB at 30 degrees  Pressure Reduction Devices: pressure-redistributing mattress utilized  Skin Protection:   incontinence pads utilized   protective footwear used   transparent dressing maintained     Problem: Fall Injury Risk  Goal: Absence of Fall and Fall-Related Injury  Outcome: Progressing  Intervention: Identify and Manage Contributors  Recent Flowsheet Documentation  Taken 12/11/2024 1930 by Delia Cole RN  Medication Review/Management: medications reviewed  Intervention: Promote Injury-Free Environment  Recent Flowsheet Documentation  Taken 12/12/2024 0000 by Delia Cole RN  Safety Promotion/Fall Prevention: safety round/check completed  Taken 12/11/2024 2200 by Delia Cole RN  Safety Promotion/Fall Prevention: safety round/check completed  Taken 12/11/2024 2100 by Delia Cole RN  Safety Promotion/Fall Prevention: safety round/check completed  Taken 12/11/2024 2000 by Delia Cole RN  Safety Promotion/Fall Prevention: safety round/check completed  Taken 12/11/2024 1930 by Delia Cole RN  Safety Promotion/Fall Prevention: safety round/check completed   Goal Outcome Evaluation:  Plan of Care Reviewed With: family        Progress: no change  Outcome Evaluation: Comfort measures. Patient slept all shift. Arouses slightly with touch. Sellers catheter in place. Family at bedside. Patient to go home with hospice. Safety maintained. Will continue to monitor.

## 2024-12-12 NOTE — CASE MANAGEMENT/SOCIAL WORK
Continued Stay Note   [place]     Patient Name: Alka Nicolas  MRN: [id number]  Today's Date: 12/12/2024    Admit Date: 12/10/2024    Plan: Home with [facility]   Discharge Plan       Row Name 12/12/24 0846       Plan    Plan Home with [facility]    Patient/Family in Agreement with Plan yes    Plan Comments Patient has dc orders to go home today with [facility].  Rosa Mae with [facility] is aware of discharge.  Family to advise when DME has been delivered and ambulance can be dispatched.  [facility] [phone].    Final Discharge Disposition Code 50 - home with hospice                   Discharge Codes    No documentation.                 Expected Discharge Date and Time       Expected Discharge Date Expected Discharge Time    Dec 12, 2024               SONU Rosenbaum

## 2024-12-12 NOTE — DISCHARGE SUMMARY
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     End of life care     Hyponatremia     Hyperbilirubinemia     Hypocalcemia     Transaminitis     Anemia of chronic disease     Acute-on-chronic kidney injury     Paroxysmal atrial fibrillation     Acute renal failure superimposed on stage 4 chronic kidney disease        Consults: None    Procedures Performed: None    Pertinent Test Results:   Results for orders placed during the hospital encounter of 10/22/21    Adult Transthoracic Echo Complete w/ Color, Spectral and Contrast if Necessary Per Protocol    Interpretation Summary  · Left ventricular wall thickness is consistent with mild eccentric hypertrophy.  · Left ventricular ejection fraction appears to be 56 - 60%. Left ventricular systolic function is normal.  · Left ventricular diastolic function was indeterminate.  · Left atrial volume is mildly increased.  · There is mild, anterior mitral leaflet thickening present.  · Mild mitral valve stenosis is present.  · There is a circumferential pericardial effusion.      Imaging Results (All)       Procedure Component Value Units Date/Time    CT Abdomen Pelvis Without Contrast [[id number]] Collected: 12/10/24 1050     Updated: 12/10/24 1100    Narrative:      EXAM: CT ABDOMEN PELVIS WO CONTRAST-      DATE: 12/10/2024 9:35 AM     HISTORY: Worsening renal function with elevated T. bili history of small  cell lung carcinoma possible metastasis       COMPARISON: None available.     DOSE LENGTH PRODUCT: 203.11 mGy.cm Automatic exposure control was  utilized to make radiation dose as low as reasonably  achievable.     TECHNIQUE: Noncontrast axial images of the abdomen and pelvis obtained  with multiplanar reformats.     FINDINGS:  VISUALIZED CHEST: No pericardial or left pleural effusion is seen. There  is a small right pleural effusion with associated atelectasis.     LIVER/BILE DUCTS: The unenhanced liver is unremarkable. Patient is  status post cholecystectomy.     KIDNEYS/URETERS: There are multiple right renal cysts. There is no  hydronephrosis. Probable hyperdense cyst at the lower pole of the left  kidney is also noted.     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 Right  adrenal nodule measures 1.6 cm and was hypermetabolic on the PET from  10/28/2024.      SPLEEN: Unremarkable.     PANCREAS: Unenhanced pancreas is unremarkable.     MESENTERY: No mesenteric or retroperitoneal lymphadenopathy is seen.     VASCULATURE: There is calcified atherosclerosis of the abdominal aorta  which is torturous.     GI TRACT: There is a small hiatal hernia. There is no bowel obstruction.  There is moderate stool in the colon.     PELVIS: Small collection of gas is noted anteriorly in the urinary  bladder may be infectious. Patient is status post hysterectomy. There is  diverticulosis of the sigmoid colon and free fluid is noted in the  pelvis.     SOFT TISSUES: Normal appearance of the overlying abdominal  and pelvic  soft tissues.      BONES: No acute or aggressive bony lesion.           Impression:      1. Small right pleural effusion with associated atelectasis is new  compared to the prior PET from 10/28/2024.  2. No definite hepatic lesion is seen on the noncontrast enhanced  images. The patient is status post cholecystectomy.  3. Left adrenal milo lipoma and stable right adrenal nodule which was  hypermetabolic on the prior PET.  4. Moderate stool in the colon and diverticulosis of the sigmoid colon.  5. Collection of air in the urinary bladder may be infectious in  etiology.  6. Small free fluid  in the pelvis of unclear etiology.        This report was signed and finalized on 12/10/2024 10:57 AM by Marcos Umanzor.             LAB RESULTS:      Lab 12/10/24  0743   WBC 3.86   HEMOGLOBIN 8.5*   HEMATOCRIT 24.7*   PLATELETS 103*   NEUTROS ABS 3.21   IMMATURE GRANS (ABS) 0.02   LYMPHS ABS 0.37*   MONOS ABS 0.26   EOS ABS 0.00   MCV 82.6         Lab 12/10/24  0743   SODIUM 122*   POTASSIUM 3.3*   CHLORIDE 97*   CO2 12.0*   ANION GAP 13.0   BUN 59*   CREATININE 3.00*   EGFR 14.4*   GLUCOSE 117*   CALCIUM 7.3*   MAGNESIUM 1.9         Lab 12/10/24  1030 12/10/24  0743   TOTAL PROTEIN  --  5.1*   ALBUMIN  --  3.3*   GLOBULIN  --  1.8   ALT (SGPT)  --  145*   AST (SGOT)  --  201*   BILIRUBIN 2.1* 2.2*   INDIRECT BILIRUBIN 0.5  --    BILIRUBIN DIRECT 1.6*  --    ALK PHOS  --  428*                     Brief Urine Lab Results  (Last result in the past 365 days)        Color   Clarity   Blood   Leuk Est   Nitrite   Protein   CREAT   Urine HCG        12/18/23 1446 Dark Yellow   Slightly Cloudy   Trace   Small (1+)   Positive   30 mg/dL                 Microbiology Results (last 10 days)       ** No results found for the last 240 hours. **            Hospital Course: Alka Nicolas is an 89-year-old female with a past medical history of abdominal aortic aneurysm, chest pain, CAD, esophageal reflux, hyperlipidemia, hypertension, tobacco abuse, macular degeneration, thyroid disease and lung cancer. Small cell carcinoma: stage: IV (cT3, cN2, M1), extensive stage. She has opted to receive palliative systemic therapy: Carboplatin+etoposide (VP16)+atezolizumab, C1-11/5/24-11/7/24.  Patient was asked to present to the emergency department post doctor's office appointment on 12/10/2024 with abnormal laboratory data.  Patient presented for her scheduled chemotherapy and upon review of abnormal labs patient additionally complained of significant shortness of breath, weakness, dizziness and hypotension.  ED workup revealed an NA  of 122, K of 3.3, CL 97, bicarb 12.0, BUN 59, CR 3.00, GFR 14, Ca 7.3, alkaline phosphatase 428, albumin 3.3, , , total bili 2.2 bili direct 1.6, Hb 8.5, HCT 24.7, .  Per ED patient was given a 1 L saline bolus, normal saline at 125 initiated as well as sodium bicarb 8.4 with 150 mEq in sterile water, 2 mg of morphine and 4 mg of Zofran.     Upon assessment patient appeared to be in significant respiratory distress with her daughters Hui and Yaquelin at bedside.  Patient is very much alert and oriented and able to participate in assessment and history.  A very lengthy discussion with family regarding laboratory data, prognosis, and patient's wishes completed.  Patient initially wanted to proceed with hospice and then decided to try the palliative chemo; however, it appears that she is aware that it is making things worse and she would like to stop all treatment.  Discussed the options of DNR/DNI with aggressive treatment versus comfort care.  Patient would like to proceed with comfort care measures this was agreed upon by herself, her daughter Hui and her other daughter Yaquelin.  They were made aware of the next proceeding steps to include discontinuing all aggressive treatment and medications.  Patient will be given 60 mg of IV Lasix after Sellers catheter in place to assist with fluid overload, morphine and Ativan will be given sublingual as patient would like to proceed home with hospice as soon as possible.  Patient will be evaluated closely for tolerance and IV will be initiated if it is not sufficient.  All patient and family's questions were answered and they are in agreement with comfort measures and admission for hospice set up and plan of care for discharge home.    Patient was admitted to the medical floor and comfort measures initiated. Patient was given sublingual morphine and Ativan with plans for hospice.  Daughters very comfortable with decision for hospice, comfort measures.  She  "was evaluated by [facility] on 12/11/2024 with plans for discharge home on home hospice.  Prescriptions for morphine sublingual and Ativan sublingual at discharge.  Oxygen at 2 L.  Patient resting comfortably without evidence of pain.    On 12/12/2024, she will be discharged home with [facility] hospice.  Home DME's to be delivered, oxygen at 2 L.  Morphine and Ativan prescribed.      Physical Exam on Discharge:  BP (!) 79/32 (BP Location: Left arm, Patient Position: Lying) Comment: verified with RN  Pulse 70   Temp 97.6 °F (36.4 °C) (Axillary)   Resp 15   Ht 149.9 cm (59\")   Wt 59.5 kg (131 lb 2.8 oz)   LMP  (LMP Unknown)   SpO2 99%   BMI 26.49 kg/m²   Physical Exam  Vitals and nursing note reviewed.   Constitutional:       Comments: Patient resting in bed on oxygen at 2 L.  Family members in room.  End-of-life care.  Comfort measures.   HENT:      Head: Normocephalic and atraumatic.      Nose: No congestion.      Mouth/Throat:      Pharynx: Oropharynx is clear. No oropharyngeal exudate or posterior oropharyngeal erythema.   Eyes:      Extraocular Movements: Extraocular movements intact.      Pupils: Pupils are equal, round, and reactive to light.   Cardiovascular:      Rate and Rhythm: Normal rate and regular rhythm.   Pulmonary:      Breath sounds: No wheezing, rhonchi or rales.      Comments: Respirations even, unlabored, saturation 99% on 2 L.  Abdominal:      Palpations: Abdomen is soft.      Tenderness: There is no abdominal tenderness.   Genitourinary:     Comments: Sellers catheter in place  Musculoskeletal:         General: No swelling or tenderness.      Cervical back: Normal range of motion and neck supple.   Skin:     General: Skin is warm and dry.   Neurological:      General: No focal deficit present.      Comments: Resting.  Comfort measures.  End-of-life care.   Psychiatric:      Comments: Impaired thought.  Impaired judgment         Condition on Discharge: Terminal.  Home " with [facility] hospice.    Discharge Disposition:      Discharge Medications:     Discharge Medications        New Medications        Instructions Start Date   LORazepam 2 MG/ML concentrated solution  Commonly known as: ATIVAN   0.5 mg, Oral, Every 3 Hours PRN      morphine 20 MG/ML concentrated solution 20mg/ml   10 mg, Oral, Every 4 Hours PRN      naloxone 4 MG/0.1ML nasal spray  Commonly known as: NARCAN   Call 911. Don't prime. Cardale in 1 nostril for overdose. Repeat in 2-3 minutes in other nostril if no or minimal breathing/responsiveness.      Polyvinyl Alcohol-Povidone PF 1.4-0.6 % ophthalmic solution  Commonly known as: ARTIFICIAL TEARS   1 drop, Both Eyes, As Needed             Stop These Medications      albuterol sulfate  (90 Base) MCG/ACT inhaler  Commonly known as: PROVENTIL HFA;VENTOLIN HFA;PROAIR HFA     amLODIPine 5 MG tablet  Commonly known as: NORVASC     apixaban 2.5 MG tablet tablet  Commonly known as: ELIQUIS     atorvastatin 40 MG tablet  Commonly known as: LIPITOR     carvedilol 12.5 MG tablet  Commonly known as: COREG     cloNIDine 0.1 MG tablet  Commonly known as: CATAPRES     HYDROcodone-acetaminophen 5-325 MG per tablet  Commonly known as: NORCO     Hydrocortisone (Perianal) 2.5 % rectal cream  Commonly known as: ANUSOL-HC     levothyroxine 100 MCG tablet  Commonly known as: SYNTHROID, LEVOTHROID     lidocaine-prilocaine 2.5-2.5 % cream  Commonly known as: EMLA     ondansetron 8 MG tablet  Commonly known as: ZOFRAN     potassium chloride 10 MEQ CR tablet  Commonly known as: KLOR-CON M10     PRESERVISION AREDS 2+MULTI VIT PO     prochlorperazine 10 MG tablet  Commonly known as: COMPAZINE              Discharge Diet:   Diet Instructions       Diet: Nothing By Mouth      Discharge Diet: Nothing By Mouth            Activity at Discharge:   Activity Instructions       Activity as Tolerated              Discharge instructions:  1.  Home with [facility] hospice  2.  Oxygen  as needed  3.  Morphine solution.  20 mg/mL.  0.5 mL every 4 hours as needed for pain, agitation, shortness of breath, hospice patient on comfort measures.  4.  Ativan 2 mg/mL concentrated solution.  0.25 mL every 3 hours as needed for anxiety, agitation, hospice patient on comfort measures.    Follow-up Appointments:   Future Appointments   Date Time Provider Department Center   12/16/2024  1:15 PM  [facility]  [facility]   12/17/2024 10:30 AM [facility] 1  [facility] [place]   12/23/2024  1:00 PM  [facility]  [facility]   12/23/2024  1:30 PM Vance Davis MD [facility]   12/30/2024  1:15 PM  [facility]  [facility]   9/17/2025 11:00 AM [facility] ECHO ROOM 1  [facility]   9/24/2025  1:00 PM Marty Peterson APRN [facility]       Test Results Pending at Discharge: None    Electronically signed by CHRISTIANO Gill, 12/12/24, 08:16 CST.    Time: 35 minutes.  Discussed with Dr. Chao, daughters Hui and Yaquelin.    The above documentation resulted from a face-to-face encounter by me Allison ALVARADO, [facility].

## 2024-12-12 NOTE — PLAN OF CARE
Problem: Adult Inpatient Plan of Care  Goal: Plan of Care Review  Outcome: Adequate for Care Transition  Goal: Patient-Specific Goal (Individualized)  Outcome: Adequate for Care Transition  Goal: Absence of Hospital-Acquired Illness or Injury  Outcome: Adequate for Care Transition  Intervention: Identify and Manage Fall Risk  Recent Flowsheet Documentation  Taken 12/12/2024 0800 by Bimal Spicer RN  Safety Promotion/Fall Prevention:   safety round/check completed   room organization consistent  Goal: Optimal Comfort and Wellbeing  Outcome: Adequate for Care Transition  Intervention: Provide Person-Centered Care  Recent Flowsheet Documentation  Taken 12/12/2024 0800 by Bimal Spicer RN  Trust Relationship/Rapport:   care explained   choices provided   emotional support provided   empathic listening provided   questions encouraged   reassurance provided   thoughts/feelings acknowledged  Goal: Readiness for Transition of Care  Outcome: Adequate for Care Transition     Problem: Skin Injury Risk Increased  Goal: Skin Health and Integrity  Outcome: Adequate for Care Transition  Intervention: Optimize Skin Protection  Recent Flowsheet Documentation  Taken 12/12/2024 0800 by Bimal Spicer RN  Activity Management: bedrest  Pressure Reduction Techniques:   weight shift assistance provided   heels elevated off bed   pressure points protected  Pressure Reduction Devices: pressure-redistributing mattress utilized     Problem: Fall Injury Risk  Goal: Absence of Fall and Fall-Related Injury  Outcome: Adequate for Care Transition  Intervention: Promote Injury-Free Environment  Recent Flowsheet Documentation  Taken 12/12/2024 0800 by Bimal Spicer RN  Safety Promotion/Fall Prevention:   safety round/check completed   room organization consistent       Goal Outcome Evaluation:      Pt DC to Home with Hospice

## 2024-12-16 ENCOUNTER — TELEPHONE (OUTPATIENT)
Dept: ONCOLOGY | Facility: CLINIC | Age: 89
End: 2024-12-16

## 2024-12-16 NOTE — TELEPHONE ENCOUNTER
“Please be informed that patient has passed. Patient has been marked  in the system. The date of death is:24    Caller: Hui Machado - DAUGHTER    Relationship: Emergency Contact    Best call back number: [phone]    Did the patient have surgery within 30 days of their passing (Y/N): N

## (undated) DEVICE — MODEL BT2000 P/N 700287-012KIT CONTENTS: MANIFOLD WITH SALINE AND CONTRAST PORTS, SALINE TUBING WITH SPIKE AND HAND SYRINGE, TRANSDUCER: Brand: BT2000 AUTOMATED MANIFOLD KIT

## (undated) DEVICE — INTENDED FOR TISSUE SEPARATION, AND OTHER PROCEDURES THAT REQUIRE A SHARP SURGICAL BLADE TO PUNCTURE OR CUT.: Brand: BARD-PARKER ® STAINLESS STEEL BLADES

## (undated) DEVICE — SUT PROLN 2/0 SH 36IN 8523H

## (undated) DEVICE — CATH DIAG IMPULSE M/ PK 145 5FR

## (undated) DEVICE — ADAPT SWVL FIBROPTIC BRONCH

## (undated) DEVICE — SUT MNCRYL 4/0 PS2 27IN UD MCP426H

## (undated) DEVICE — 3M™ IOBAN™ 2 ANTIMICROBIAL INCISE DRAPE 6650EZ: Brand: IOBAN™ 2

## (undated) DEVICE — ELECTRD PAD DEFIB A/

## (undated) DEVICE — ANTIBACTERIAL UNDYED BRAIDED (POLYGLACTIN 910), SYNTHETIC ABSORBABLE SUTURE: Brand: COATED VICRYL

## (undated) DEVICE — TRAP,MUCUS SPECIMEN, 80CC: Brand: MEDLINE

## (undated) DEVICE — 4-PORT MANIFOLD: Brand: NEPTUNE 2

## (undated) DEVICE — MODEL AT P65, P/N 701554-001KIT CONTENTS: HAND CONTROLLER, 3-WAY HIGH-PRESSURE STOPCOCK WITH ROTATING END AND PREMIUM HIGH-PRESSURE TUBING: Brand: ANGIOTOUCH® KIT

## (undated) DEVICE — NDL HYPO PRECISIONGLIDE REG 25G 1 1/2

## (undated) DEVICE — SENSR O2 OXIMAX FNGR A/ 18IN NONSTR

## (undated) DEVICE — TRAP FLD MINIVAC MEGADYNE 100ML

## (undated) DEVICE — SOLIDIFIER LIQUI LOC PLUS 2000CC

## (undated) DEVICE — SOL IRR NACL 0.9PCT BT 1000ML

## (undated) DEVICE — Device: Brand: BALLOON

## (undated) DEVICE — PINNACLE INTRODUCER SHEATH: Brand: PINNACLE

## (undated) DEVICE — IMMOB KN 3PNL DLX CANVS 19IN BLU

## (undated) DEVICE — SWIVEL CONNECTOR

## (undated) DEVICE — TBG SMPL FLTR LINE NASL 02/C02 A/ BX/100

## (undated) DEVICE — TBG PRESS EXT

## (undated) DEVICE — GLV SURG SENSICARE W/ALOE PF LF 7.5 STRL

## (undated) DEVICE — SINGLE USE SUCTION VALVE MAJ-209: Brand: SINGLE USE SUCTION VALVE (STERILE)

## (undated) DEVICE — SYR CONTRL LUERLOK 10CC

## (undated) DEVICE — GW STARTER FXD CORE J .035 3X150CM 3MM

## (undated) DEVICE — DECANTER: Brand: UNBRANDED

## (undated) DEVICE — CANN CO2/O2 NASL A/

## (undated) DEVICE — SINGLE USE BIOPSY VALVE MAJ-210: Brand: SINGLE USE BIOPSY VALVE (STERILE)

## (undated) DEVICE — Device: Brand: ION

## (undated) DEVICE — ADHS SKIN PREMIERPRO EXOFIN TOPICAL HI/VISC .5ML

## (undated) DEVICE — THE CHANNEL CLEANING BRUSH IS A NYLON FLEXI BRUSH ATTACHED TO A FLEXIBLE PLASTIC SHEATH DESIGNED TO SAFELY REMOVE DEBRIS FROM FLEXIBLE ENDOSCOPES.

## (undated) DEVICE — SUT SILK 2/0 SUTUPAK TIES 24IN SA75H

## (undated) DEVICE — VISION PROBE ADAPTER AND SUCTION ADAPTER

## (undated) DEVICE — BIOPSY NEEDLE, 23G: Brand: FLEXISION

## (undated) DEVICE — PAD MINOR UNIVERSAL: Brand: MEDLINE INDUSTRIES, INC.

## (undated) DEVICE — PK CATH CARD 30 CA/4

## (undated) DEVICE — CVR UNIV C/ARM

## (undated) DEVICE — KT ASP VIZISHOT 5SYRG 5BIOPSY/VLV 22G